# Patient Record
Sex: FEMALE | Race: WHITE | NOT HISPANIC OR LATINO | Employment: OTHER | URBAN - METROPOLITAN AREA
[De-identification: names, ages, dates, MRNs, and addresses within clinical notes are randomized per-mention and may not be internally consistent; named-entity substitution may affect disease eponyms.]

---

## 2017-02-08 ENCOUNTER — OFFICE VISIT (OUTPATIENT)
Dept: INTERNAL MEDICINE | Facility: CLINIC | Age: 67
End: 2017-02-08
Payer: COMMERCIAL

## 2017-02-08 VITALS
BODY MASS INDEX: 33.84 KG/M2 | DIASTOLIC BLOOD PRESSURE: 66 MMHG | OXYGEN SATURATION: 97 % | WEIGHT: 191 LBS | SYSTOLIC BLOOD PRESSURE: 121 MMHG | HEART RATE: 82 BPM | TEMPERATURE: 98.9 F

## 2017-02-08 DIAGNOSIS — H65.02 ACUTE SEROUS OTITIS MEDIA OF LEFT EAR, RECURRENCE NOT SPECIFIED: Primary | ICD-10-CM

## 2017-02-08 PROCEDURE — 99213 OFFICE O/P EST LOW 20 MIN: CPT | Performed by: INTERNAL MEDICINE

## 2017-02-08 NOTE — PROGRESS NOTES
SUBJECTIVE:                                                    Marisol Orta is a 66 year old female who presents to clinic today for the following health issues:      ENT Symptoms             Symptoms: cc Present Absent Comment   Fever/Chills   x    Fatigue  x     Muscle Aches   x    Eye Irritation   x    Sneezing   x    Nasal Mariano/Drg  x  Thick, light yellow, and a lot of it-this started only a few days ago. She also has nasal backdrip   Sinus Pressure/Pain  x     Loss of smell  x     Dental pain  x     Sore Throat   x    Swollen Glands   x    Ear Pain/Fullness  x  Left ear; decreased hearing.  no otorrhea, no tinnitus.     Cough  x  She feels that she needs to expectorate something at times, but has a hard time doing so.     Wheeze  x     Chest Pain   x    Shortness of breath  x     Rash   x    Other   x      Symptom duration: Cold for about 10 days, ear pain started today; and the other cold symptoms are worsening.    Symptom severity:  moderate ear pain   Treatments tried:  aleve- helped   Contacts:  family is sick       She has a family history of asthma and COPD, but she has no personal history of either.      Problem list and histories reviewed & adjusted, as indicated.  Additional history: as documented    Patient Active Problem List   Diagnosis     Back pain     Osteoarthritis     Hypertension goal BP (blood pressure) < 140/90     Vitreous membranes and strands     Posterior vitreous detachment- left     Advanced directives, counseling/discussion     Pseudoexfoliation of lens capsule, bilateral     CARDIOVASCULAR SCREENING; LDL GOAL LESS THAN 160     Cataract, nuclear, left     Past Surgical History   Procedure Laterality Date     Appendectomy       Foot surgery       left foot 1st  fusion     Orthopedic surgery         Social History   Substance Use Topics     Smoking status: Never Smoker      Smokeless tobacco: Never Used      Comment: smoke free household     Alcohol Use: No     Family History    Problem Relation Age of Onset     Lipids Mother      Cancer - colorectal Mother 70     Hypertension Mother      Arthritis Mother      CANCER Mother 84     lung cancer      Thyroid Disease Mother      Arthritis Father      Hypertension Father      Macular Degeneration Father 85     monitors visison with [employer] grid, more trouble reading     DIABETES Maternal Grandmother      DIABETES Paternal Grandmother      Asthma Brother      Hypertension Sister      Thyroid Disease Sister      Glaucoma Paternal Aunt      DIABETES Paternal Aunt      Eye Disorder Son 25     keratoconus?     Glaucoma Daughter 18     montoring pressure     CEREBROVASCULAR DISEASE No family hx of          Current Outpatient Prescriptions   Medication Sig Dispense Refill     triamterene-hydrochlorothiazide (DYAZIDE) 37.5-25 MG per capsule Take 1 capsule by mouth every morning 90 capsule 1     lisinopril (PRINIVIL/ZESTRIL) 2.5 MG tablet Take 1 tablet (2.5 mg) by mouth daily 90 tablet 1     Multiple Vitamins-Minerals (MULTIVITAMIN GUMMIES ADULT PO)        CALCIUM 600+D PO 2 tablet daily         ==============================================================  ROS:  Constitutional, HEENT, cardiovascular, pulmonary, GI, , musculoskeletal, neuro, skin, endocrine and psych systems are negative, except as otherwise noted.       OBJECTIVE:                                                    /66 mmHg  Pulse 82  Temp(Src) 98.9  F (37.2  C) (Oral)  Wt 191 lb (86.637 kg)  SpO2 97%  Body mass index is 33.84 kg/(m^2).     GENERAL APPEARANCE: healthy, alert and in no distress  EYES: Eyes grossly normal to inspection, and conjunctivae and sclerae normal  HENT: ear canals and right TM normal; left TM with erythema and edema; nose and mouth without ulcers or lesions, oropharynx clear and oral mucous membranes moist  NECK: no adenopathy, no asymmetry, masses, or scars   RESP: lungs clear to auscultation - no rales, rhonchi or wheezes  CV: regular rate and  rhythm, normal S1 S2, no S3 or S4, no murmur, click or rub, no peripheral edema and peripheral pulses strong  ABDOMEN: soft, nontender, no hepatosplenomegaly, no masses and bowel sounds normal  MS: no musculoskeletal defects are noted and gait is age appropriate without ataxia  SKIN: no suspicious lesions or rashes  NEURO: mentation intact and speech normal  PSYCH: mentation appears normal and affect normal/bright.        ASSESSMENT/PLAN:                                                        ICD-10-CM    1. Acute serous otitis media of left ear, recurrence not specified H65.02 amoxicillin-clavulanate (AUGMENTIN) 875-125 MG per tablet       Patient Instructions   Please take the antibiotics and let us know if you are not better after 3 complete days on it.        See below:    Otitis Media (Middle-Ear Infection) in Adults  Otitis media is another name for a middle-ear infection. It means an infection behind your eardrum. This kind of ear infection can happen after any condition that keeps fluid from draining from the middle ear. These conditions include allergies, a cold, a sore throat, or a respiratory infection.  Middle-ear infections are common in children, but they can also happen in adults. An ear infection in an adult may mean a more serious problem than in a child. So you may need additional tests. If you have an ear infection, you should see your health care provider for treatment.  What are the types of middle-ear infections?  Infections can affect the middle ear in several ways. They are:    Acute otitis media. This middle-ear infection occurs suddenly. It causes swelling and redness. Fluid and mucus become trapped inside the ear. You can have a fever and ear pain.    Otitis media with effusion. Fluid (effusion) and mucus build up in the middle ear after the infection goes away. You may feel like your middle ear is full. This can continue for months and may affect your hearing.    Chronic otitis media with  effusion. Fluid (effusion) remains in the middle ear for a long time. Or it builds up again and again, even though there is no infection. This type of middle-ear infection may be hard to treat. It may also affect your hearing.  Who is more likely to get a middle-ear infection?  You are more likely to get an ear infection if you:    Smoke or are around someone who smokes    Have seasonal or year-round allergy symptoms    Have a cold or other upper respiratory infection  What causes a middle-ear infection?  The middle ear connects to the throat by a canal called the eustachian tube. This tube helps even out the pressure between the outer ear and the inner ear. A cold or allergy can irritate the tube or cause the area around it to swell. This can keep fluid from draining from the middle ear. The fluid builds up behind the eardrum. Bacteria and viruses can grow in this fluid. The bacteria and viruses cause the middle-ear infection.  What are the symptoms of a middle-ear infection?  Common symptoms of a middle-ear infection in adults are:    Pain in 1 or both ears    Drainage from the ear    Muffled hearing    Sore throat   You may also have a fever. Rarely, your balance can be affected.  These symptoms may be the same as for other conditions. It s important to talk with your health care provider if you think you have a middle-ear infection. If you have a high fever, severe pain behind your ear, or paralysis in your face, see your provider as soon as you can.  How is a middle-ear infection diagnosed?  Your health care provider will take a medical history and do a physical exam. He or she will look at the outer ear and eardrum with an otoscope. The otoscope is a lighted tool that lets your provider see inside the ear. A pneumatic otoscope blows a puff of air into the ear to check how well your eardrum moves. If you eardrum doesn t move well, it may mean you have fluid behind it.  Your provider may also do a test called  tympanometry. This test tells how well the middle ear is working. It can find any changes in pressure in the middle ear. Your provider may test your hearing with a tuning fork.  How is a middle-ear infection treated?  A middle-ear infection may be treated with:    Antibiotics, taken by mouth or as ear drops    Medication for pain    Decongestants, antihistamines, or nasal steroids  Your health care provider may also have you try autoinsufflation. This helps adjust the air pressure in your ear. For this, you pinch your nose and gently exhale. This forces air back through the eustachian tube.  The exact treatment for your ear infection will depend on the type of infection you have. In general, if your symptoms don t get better in 48 to 72 hours, contact your health care provider.  Middle-ear infections can cause long-term problems if not treated. They can lead to:    Infection in other parts of the head    Permanent hearing loss    Paralysis of a nerve in your face  If you have a middle-ear infection that doesn t get better, you may need to see an ear, nose, and throat specialist (otolaryngologist). You may need a CT scan or MRI to check for head and neck cancer.      Middle Ear Infection (Adult)  You have an infection of the middle ear (the space behind the eardrum). This is also called acute otitis media (AOM). Sometimes it is caused by the common cold. This is because congestion can block the internal passage (eustachian tube) that drains fluid from the middle ear. When the middle ear fills with fluid, bacteria can grow there and cause an infection. Oral antibiotics are used to treat this illness, not ear drops. Symptoms usually start to improve within 1 to 2 days of treatment.    Home care  The following are general care guidelines:    Finish all of the antibiotic medicine given, even though you may feel better after the first few days.    You may use acetaminophen or ibuprofen to control pain, unless something  else was prescribed. [NOTE: If you have chronic liver or kidney disease or have ever had a stomach ulcer or GI bleeding, talk with your doctor before using these medicines.] Do not give aspirin to anyone under 18 years of age who has a fever. It may cause severe liver damage.  Follow-up care  Follow up with your doctor in 2 weeks if all symptoms have not gotten better, or if hearing doesn't go back to normal within 1 month.  When to seek medical care  Get prompt medical attention if any of the following occur:    Ear pain gets worse or does not improve after 3 days of treatment    Unusual drowsiness or confusion    Neck pain, stiff neck, or headache    Fluid or blood draining from the ear canal    Fever of 100.4 F (38 C) or higher after 3 days of antibiotics, or as directed by your health care provider    Convulsion (seizure)    7283-9505 [employer]. [address] [place], [place] [id number]. All rights reserved. This information is not intended as a substitute for professional medical care. Always follow your healthcare professional's instructions.                        Silvia Miller MD  [facility]

## 2017-02-08 NOTE — NURSING NOTE
"Chief Complaint   Patient presents with     Otalgia     Left ear pain       Initial /66 mmHg  Pulse 82  Temp(Src) 98.9  F (37.2  C) (Oral)  Wt 191 lb (86.637 kg)  SpO2 97% Estimated body mass index is 33.84 kg/(m^2) as calculated from the following:    Height as of 12/27/16: 5' 3\" (1.6 m).    Weight as of this encounter: 191 lb (86.637 kg).  BP completed using cuff size: jenna Love, KAREN    "

## 2017-02-08 NOTE — MR AVS SNAPSHOT
After Visit Summary   2/8/2017    Marisol Orta    MRN: [id number]           Patient Information     Date Of Birth          1950        Visit Information        Provider Department      2/8/2017 5:20 PM Silvia Miller MD [facility]        Today's Diagnoses     Acute serous otitis media of left ear, recurrence not specified    -  1       Care Instructions    Please take the antibiotics and let us know if you are not better after 3 complete days on it.        See below:    Otitis Media (Middle-Ear Infection) in Adults  Otitis media is another name for a middle-ear infection. It means an infection behind your eardrum. This kind of ear infection can happen after any condition that keeps fluid from draining from the middle ear. These conditions include allergies, a cold, a sore throat, or a respiratory infection.  Middle-ear infections are common in children, but they can also happen in adults. An ear infection in an adult may mean a more serious problem than in a child. So you may need additional tests. If you have an ear infection, you should see your health care provider for treatment.  What are the types of middle-ear infections?  Infections can affect the middle ear in several ways. They are:    Acute otitis media. This middle-ear infection occurs suddenly. It causes swelling and redness. Fluid and mucus become trapped inside the ear. You can have a fever and ear pain.    Otitis media with effusion. Fluid (effusion) and mucus build up in the middle ear after the infection goes away. You may feel like your middle ear is full. This can continue for months and may affect your hearing.    Chronic otitis media with effusion. Fluid (effusion) remains in the middle ear for a long time. Or it builds up again and again, even though there is no infection. This type of middle-ear infection may be hard to treat. It may also affect your hearing.  Who is more likely to get a  middle-ear infection?  You are more likely to get an ear infection if you:    Smoke or are around someone who smokes    Have seasonal or year-round allergy symptoms    Have a cold or other upper respiratory infection  What causes a middle-ear infection?  The middle ear connects to the throat by a canal called the eustachian tube. This tube helps even out the pressure between the outer ear and the inner ear. A cold or allergy can irritate the tube or cause the area around it to swell. This can keep fluid from draining from the middle ear. The fluid builds up behind the eardrum. Bacteria and viruses can grow in this fluid. The bacteria and viruses cause the middle-ear infection.  What are the symptoms of a middle-ear infection?  Common symptoms of a middle-ear infection in adults are:    Pain in 1 or both ears    Drainage from the ear    Muffled hearing    Sore throat   You may also have a fever. Rarely, your balance can be affected.  These symptoms may be the same as for other conditions. It s important to talk with your health care provider if you think you have a middle-ear infection. If you have a high fever, severe pain behind your ear, or paralysis in your face, see your provider as soon as you can.  How is a middle-ear infection diagnosed?  Your health care provider will take a medical history and do a physical exam. He or she will look at the outer ear and eardrum with an otoscope. The otoscope is a lighted tool that lets your provider see inside the ear. A pneumatic otoscope blows a puff of air into the ear to check how well your eardrum moves. If you eardrum doesn t move well, it may mean you have fluid behind it.  Your provider may also do a test called tympanometry. This test tells how well the middle ear is working. It can find any changes in pressure in the middle ear. Your provider may test your hearing with a tuning fork.  How is a middle-ear infection treated?  A middle-ear infection may be treated  with:    Antibiotics, taken by mouth or as ear drops    Medication for pain    Decongestants, antihistamines, or nasal steroids  Your health care provider may also have you try autoinsufflation. This helps adjust the air pressure in your ear. For this, you pinch your nose and gently exhale. This forces air back through the eustachian tube.  The exact treatment for your ear infection will depend on the type of infection you have. In general, if your symptoms don t get better in 48 to 72 hours, contact your health care provider.  Middle-ear infections can cause long-term problems if not treated. They can lead to:    Infection in other parts of the head    Permanent hearing loss    Paralysis of a nerve in your face  If you have a middle-ear infection that doesn t get better, you may need to see an ear, nose, and throat specialist (otolaryngologist). You may need a CT scan or MRI to check for head and neck cancer.      Middle Ear Infection (Adult)  You have an infection of the middle ear (the space behind the eardrum). This is also called acute otitis media (AOM). Sometimes it is caused by the common cold. This is because congestion can block the internal passage (eustachian tube) that drains fluid from the middle ear. When the middle ear fills with fluid, bacteria can grow there and cause an infection. Oral antibiotics are used to treat this illness, not ear drops. Symptoms usually start to improve within 1 to 2 days of treatment.    Home care  The following are general care guidelines:    Finish all of the antibiotic medicine given, even though you may feel better after the first few days.    You may use acetaminophen or ibuprofen to control pain, unless something else was prescribed. [NOTE: If you have chronic liver or kidney disease or have ever had a stomach ulcer or GI bleeding, talk with your doctor before using these medicines.] Do not give aspirin to anyone under 18 years of age who has a fever. It may cause  severe liver damage.  Follow-up care  Follow up with your doctor in 2 weeks if all symptoms have not gotten better, or if hearing doesn't go back to normal within 1 month.  When to seek medical care  Get prompt medical attention if any of the following occur:    Ear pain gets worse or does not improve after 3 days of treatment    Unusual drowsiness or confusion    Neck pain, stiff neck, or headache    Fluid or blood draining from the ear canal    Fever of 100.4 F (38 C) or higher after 3 days of antibiotics, or as directed by your health care provider    Convulsion (seizure)    [id number] [employer]. [address] [place] [address]. All rights reserved. This information is not intended as a substitute for professional medical care. Always follow your healthcare professional's instructions.              Follow-ups after your visit        Your next 10 appointments already scheduled     Mar 21, 2017  8:30 AM   [facility]    [address] [facility] [address]   [phone]           Patient must bring picture ID.  Patient should be prepared to give a urine specimen  Please do not eat 10-12 hours before your appointment if you are coming in fasting for labs on lipids, cholesterol, or glucose (sugar).  Pregnant women should follow their Care Team instructions. Water with medications is okay. Do not drink coffee or other fluids.   If you have concerns about taking  your medications, please ask at office or if scheduling via [employer], send a message by clicking on Secure Messaging, Message Your Care Team.            Jun 20, 2017  8:00 AM   LAB with AN LAB   [facility]    [address] [facility] [address]   [phone]           Patient must bring picture ID.  Patient should be prepared to give a urine specimen  Please do not eat 10-12 hours before your appointment if you are coming  in fasting for labs on lipids, cholesterol, or glucose (sugar).  Pregnant women should follow their Care Team instructions. Water with medications is okay. Do not drink coffee or other fluids.   If you have concerns about taking  your medications, please ask at office or if scheduling via [employer], send a message by clicking on Secure Messaging, Message Your Care Team.            Jun 27, 2017  8:15 AM   PHYSICAL with Kizzy Thomason MD   [facility] ([facility])    [address] [facility] [address] [phone]              Who to contact     If you have questions or need follow up information about today's clinic visit or your schedule please contact [facility] directly at [phone].  Normal or non-critical lab and imaging results will be communicated to you by Handseeing Informationhart, letter or phone within 4 business days after the clinic has received the results. If you do not hear from us within 7 days, please contact the clinic through Handseeing Informationhart or phone. If you have a critical or abnormal lab result, we will notify you by phone as soon as possible.  Submit refill requests through [employer] or call your pharmacy and they will forward the refill request to us. Please allow 3 business days for your refill to be completed.          Additional Information About Your Visit        [employer] Information     [employer] gives you secure access to your electronic health record. If you see a primary care provider, you can also send messages to your care team and make appointments. If you have questions, please call your primary care clinic.  If you do not have a primary care provider, please call [phone] and they will assist you.        Care EveryWhere ID     This is your Care EveryWhere ID. This could be used by other organizations to access your [facility] medical records  [id number]        Your Vitals Were     Pulse Temperature Pulse Oximetry             82 98.9  F (37.2  C)  (Oral) 97%          Blood Pressure from Last 3 Encounters:   02/08/17 121/66   12/27/16 137/76   07/27/16 125/78    Weight from Last 3 Encounters:   02/08/17 191 lb (86.637 kg)   12/27/16 193 lb (87.544 kg)   07/27/16 190 lb (86.183 kg)              Today, you had the following     No orders found for display         Today's Medication Changes          These changes are accurate as of: 2/8/17  6:33 PM.  If you have any questions, ask your nurse or doctor.               Start taking these medicines.        Dose/Directions    amoxicillin-clavulanate 875-125 MG per tablet   Commonly known as:  AUGMENTIN   Used for:  Acute serous otitis media of left ear, recurrence not specified   Started by:  Silvia Miller MD        Dose:  1 tablet   Take 1 tablet by mouth 2 times daily   Quantity:  20 tablet   Refills:  0            Where to get your medicines      These medications were sent to [facility]/pharmacy #[id number] - [place] [id number] [facility],  [address] [address]     Phone:  [phone]    - amoxicillin-clavulanate 875-125 MG per tablet             Primary Care Provider Office Phone # Fax #    Kizzy Thomason [phone]       [facility] [address] [facility] [address]        Thank you!     Thank you for choosing [facility]  for your care. Our goal is always to provide you with excellent care. Hearing back from our patients is one way we can continue to improve our services. Please take a few minutes to complete the written survey that you may receive in the mail after your visit with us. Thank you!             Your Updated Medication List - Protect others around you: Learn how to safely use, store and throw away your medicines at www.disposemymeds.org.          This list is accurate as of: 2/8/17  6:33 PM.  Always use your most recent med list.                   Brand Name Dispense  Instructions for use    amoxicillin-clavulanate 875-125 MG per tablet    AUGMENTIN    20 tablet    Take 1 tablet by mouth 2 times daily       CALCIUM 600+D PO      2 tablet daily       lisinopril 2.5 MG tablet    PRINIVIL/Zestril    90 tablet    Take 1 tablet (2.5 mg) by mouth daily       MULTIVITAMIN GUMMIES ADULT PO          triamterene-hydrochlorothiazide 37.5-25 MG per capsule    DYAZIDE    90 capsule    Take 1 capsule by mouth every morning

## 2017-02-09 NOTE — PATIENT INSTRUCTIONS
Please take the antibiotics and let us know if you are not better after 3 complete days on it.        See below:    Otitis Media (Middle-Ear Infection) in Adults  Otitis media is another name for a middle-ear infection. It means an infection behind your eardrum. This kind of ear infection can happen after any condition that keeps fluid from draining from the middle ear. These conditions include allergies, a cold, a sore throat, or a respiratory infection.  Middle-ear infections are common in children, but they can also happen in adults. An ear infection in an adult may mean a more serious problem than in a child. So you may need additional tests. If you have an ear infection, you should see your health care provider for treatment.  What are the types of middle-ear infections?  Infections can affect the middle ear in several ways. They are:    Acute otitis media. This middle-ear infection occurs suddenly. It causes swelling and redness. Fluid and mucus become trapped inside the ear. You can have a fever and ear pain.    Otitis media with effusion. Fluid (effusion) and mucus build up in the middle ear after the infection goes away. You may feel like your middle ear is full. This can continue for months and may affect your hearing.    Chronic otitis media with effusion. Fluid (effusion) remains in the middle ear for a long time. Or it builds up again and again, even though there is no infection. This type of middle-ear infection may be hard to treat. It may also affect your hearing.  Who is more likely to get a middle-ear infection?  You are more likely to get an ear infection if you:    Smoke or are around someone who smokes    Have seasonal or year-round allergy symptoms    Have a cold or other upper respiratory infection  What causes a middle-ear infection?  The middle ear connects to the throat by a canal called the eustachian tube. This tube helps even out the pressure between the outer ear and the inner ear. A  cold or allergy can irritate the tube or cause the area around it to swell. This can keep fluid from draining from the middle ear. The fluid builds up behind the eardrum. Bacteria and viruses can grow in this fluid. The bacteria and viruses cause the middle-ear infection.  What are the symptoms of a middle-ear infection?  Common symptoms of a middle-ear infection in adults are:    Pain in 1 or both ears    Drainage from the ear    Muffled hearing    Sore throat   You may also have a fever. Rarely, your balance can be affected.  These symptoms may be the same as for other conditions. It s important to talk with your health care provider if you think you have a middle-ear infection. If you have a high fever, severe pain behind your ear, or paralysis in your face, see your provider as soon as you can.  How is a middle-ear infection diagnosed?  Your health care provider will take a medical history and do a physical exam. He or she will look at the outer ear and eardrum with an otoscope. The otoscope is a lighted tool that lets your provider see inside the ear. A pneumatic otoscope blows a puff of air into the ear to check how well your eardrum moves. If you eardrum doesn t move well, it may mean you have fluid behind it.  Your provider may also do a test called tympanometry. This test tells how well the middle ear is working. It can find any changes in pressure in the middle ear. Your provider may test your hearing with a tuning fork.  How is a middle-ear infection treated?  A middle-ear infection may be treated with:    Antibiotics, taken by mouth or as ear drops    Medication for pain    Decongestants, antihistamines, or nasal steroids  Your health care provider may also have you try autoinsufflation. This helps adjust the air pressure in your ear. For this, you pinch your nose and gently exhale. This forces air back through the eustachian tube.  The exact treatment for your ear infection will depend on the type of  infection you have. In general, if your symptoms don t get better in 48 to 72 hours, contact your health care provider.  Middle-ear infections can cause long-term problems if not treated. They can lead to:    Infection in other parts of the head    Permanent hearing loss    Paralysis of a nerve in your face  If you have a middle-ear infection that doesn t get better, you may need to see an ear, nose, and throat specialist (otolaryngologist). You may need a CT scan or MRI to check for head and neck cancer.      Middle Ear Infection (Adult)  You have an infection of the middle ear (the space behind the eardrum). This is also called acute otitis media (AOM). Sometimes it is caused by the common cold. This is because congestion can block the internal passage (eustachian tube) that drains fluid from the middle ear. When the middle ear fills with fluid, bacteria can grow there and cause an infection. Oral antibiotics are used to treat this illness, not ear drops. Symptoms usually start to improve within 1 to 2 days of treatment.    Home care  The following are general care guidelines:    Finish all of the antibiotic medicine given, even though you may feel better after the first few days.    You may use acetaminophen or ibuprofen to control pain, unless something else was prescribed. [NOTE: If you have chronic liver or kidney disease or have ever had a stomach ulcer or GI bleeding, talk with your doctor before using these medicines.] Do not give aspirin to anyone under 18 years of age who has a fever. It may cause severe liver damage.  Follow-up care  Follow up with your doctor in 2 weeks if all symptoms have not gotten better, or if hearing doesn't go back to normal within 1 month.  When to seek medical care  Get prompt medical attention if any of the following occur:    Ear pain gets worse or does not improve after 3 days of treatment    Unusual drowsiness or confusion    Neck pain, stiff neck, or headache    Fluid or  blood draining from the ear canal    Fever of 100.4 F (38 C) or higher after 3 days of antibiotics, or as directed by your health care provider    Convulsion (seizure)    9007-5934 [employer]. [address] [place] [address], [place] [address]. All rights reserved. This information is not intended as a substitute for professional medical care. Always follow your healthcare professional's instructions.

## 2017-03-21 DIAGNOSIS — R79.89 ELEVATED TSH: ICD-10-CM

## 2017-03-21 LAB — TSH SERPL DL<=0.005 MIU/L-ACNC: 2.22 MU/L (ref 0.4–4)

## 2017-03-21 PROCEDURE — 36415 COLL VENOUS BLD VENIPUNCTURE: CPT | Performed by: FAMILY MEDICINE

## 2017-03-21 PROCEDURE — 84443 ASSAY THYROID STIM HORMONE: CPT | Performed by: FAMILY MEDICINE

## 2017-04-06 ENCOUNTER — OFFICE VISIT (OUTPATIENT)
Dept: OPTOMETRY | Facility: CLINIC | Age: 67
End: 2017-04-06
Payer: MEDICARE

## 2017-04-06 DIAGNOSIS — H25.13 NUCLEAR SCLEROTIC CATARACT OF BOTH EYES: Primary | ICD-10-CM

## 2017-04-06 DIAGNOSIS — H52.203 ASTIGMATISM OF BOTH EYES: ICD-10-CM

## 2017-04-06 DIAGNOSIS — H52.4 PRESBYOPIA: ICD-10-CM

## 2017-04-06 DIAGNOSIS — H26.8 PSEUDOEXFOLIATION OF LENS CAPSULE: ICD-10-CM

## 2017-04-06 DIAGNOSIS — H52.13 MYOPIA OF BOTH EYES: ICD-10-CM

## 2017-04-06 PROCEDURE — 92015 DETERMINE REFRACTIVE STATE: CPT | Mod: GY | Performed by: OPTOMETRIST

## 2017-04-06 PROCEDURE — 92014 COMPRE OPH EXAM EST PT 1/>: CPT | Performed by: OPTOMETRIST

## 2017-04-06 ASSESSMENT — SLIT LAMP EXAM - LIDS
COMMENTS: NORMAL
COMMENTS: NORMAL

## 2017-04-06 ASSESSMENT — REFRACTION_MANIFEST
OS_AXIS: 140
OD_SPHERE: -3.50
OD_AXIS: 030
OD_AXIS: 026
OS_AXIS: 141
OS_SPHERE: -6.00
OS_CYLINDER: +0.50
OD_SPHERE: -3.25
OD_CYLINDER: +1.00
METHOD_AUTOREFRACTION: 1
OS_ADD: +2.50
OD_CYLINDER: +1.00
OD_ADD: +2.50
OS_SPHERE: -6.00
OS_CYLINDER: +0.50

## 2017-04-06 ASSESSMENT — CUP TO DISC RATIO
OD_RATIO: 0.2
OS_RATIO: 0.2

## 2017-04-06 ASSESSMENT — CONF VISUAL FIELD
OD_NORMAL: 1
OS_NORMAL: 1

## 2017-04-06 ASSESSMENT — TONOMETRY
OS_IOP_MMHG: 18
IOP_METHOD: APPLANATION
OD_IOP_MMHG: 18

## 2017-04-06 ASSESSMENT — KERATOMETRY
OD_AXISANGLE2_DEGREES: 146
OS_K2POWER_DIOPTERS: 43.50
OD_K1POWER_DIOPTERS: 42.00
OS_AXISANGLE2_DEGREES: 15
OD_K2POWER_DIOPTERS: 43.00
OS_K1POWER_DIOPTERS: 42.25

## 2017-04-06 ASSESSMENT — VISUAL ACUITY
OS_CC: 20/40
OD_CC: 20/25
OS_PH_CC: 20/30-1
OS_CC: 20/25+1
OS_CC+: -1
CORRECTION_TYPE: GLASSES
OD_CC+: -1
METHOD: SNELLEN - LINEAR
OD_CC: 20/20

## 2017-04-06 ASSESSMENT — REFRACTION_WEARINGRX
OD_CYLINDER: +1.25
OS_SPHERE: -4.50
OS_CYLINDER: +0.50
OS_ADD: +2.50
OD_SPHERE: -3.75
OD_ADD: +2.50
OS_AXIS: 115
OD_AXIS: 040
SPECS_TYPE: PAL

## 2017-04-06 ASSESSMENT — EXTERNAL EXAM - RIGHT EYE: OD_EXAM: NORMAL

## 2017-04-06 ASSESSMENT — EXTERNAL EXAM - LEFT EYE: OS_EXAM: NORMAL

## 2017-04-06 ASSESSMENT — PACHYMETRY
OD_CT(UM): 608
OS_CT(UM): 616

## 2017-04-06 NOTE — MR AVS SNAPSHOT
After Visit Summary   4/6/2017    Marisol Orta    MRN: [id number]           Patient Information     Date Of Birth          1950        Visit Information        Provider Department      4/6/2017 7:30 AM Sindhu Leach OD [facility]        Care Instructions    Patient was advised of today's exam findings.  Wait to fill glasses prescription   Mild cataracts, large prescription change in left eye  Return in 1 year for eye exam  Refer to [facility] [place] for cataract and baseline OCT and Field testing due to bilateral pseudoexfoliation   Their office will call you to schedule appointment.   Please call [phone] if you have not heard from them within 1 week.    Pati Ramirez and Tino  [facility]  [address] [place], [place] [address] [phone]    Sindhu Leach O.D.  [facility]   [address] [place], [place] [id number] [phone]            Follow-ups after your visit        Your next 10 appointments already scheduled     Jun 20, 2017  8:00 AM CDT   LAB with AN LAB   [facility]    [address] [facility] [address] [phone]           Patient must bring picture ID.  Patient should be prepared to give a urine specimen  Please do not eat 10-12 hours before your appointment if you are coming in fasting for labs on lipids, cholesterol, or glucose (sugar).  Pregnant women should follow their Care Team instructions. Water with medications is okay. Do not drink coffee or other fluids.   If you have concerns about taking  your medications, please ask at office or if scheduling via [facility], send a message by clicking on Secure Messaging, Message Your Care Team.            Jun 27, 2017  8:15 AM CDT   PHYSICAL with Kizzy Thomason MD   [facility] ([facility]    [address] [facility] [address] [phone]               Who to contact     If you have questions or need follow up information about today's clinic visit or your schedule please contact [facility] directly at [phone].  Normal or non-critical lab and imaging results will be communicated to you by MyChart, letter or phone within 4 business days after the clinic has received the results. If you do not hear from us within 7 days, please contact the clinic through [employer] or phone. If you have a critical or abnormal lab result, we will notify you by phone as soon as possible.  Submit refill requests through [employer] or call your pharmacy and they will forward the refill request to us. Please allow 3 business days for your refill to be completed.          Additional Information About Your Visit        [employer] Information     [employer] gives you secure access to your electronic health record. If you see a primary care provider, you can also send messages to your care team and make appointments. If you have questions, please call your primary care clinic.  If you do not have a primary care provider, please call [phone] and they will assist you.        Care EveryWhere ID     This is your Care EveryWhere ID. This could be used by other organizations to access your [place] medical records  [id number]         Blood Pressure from Last 3 Encounters:   02/08/17 121/66   12/27/16 137/76   07/27/16 125/78    Weight from Last 3 Encounters:   02/08/17 86.6 kg (191 lb)   12/27/16 87.5 kg (193 lb)   07/27/16 86.2 kg (190 lb)              Today, you had the following     No orders found for display       Primary Care Provider Office Phone # Fax #    Kizzy Thomason [phone]       [facility] [address] [address]        Thank you!     Thank you for choosing [facility]  for your care. Our goal is always to provide you with excellent care. Hearing back from our patients is one way we can continue to  improve our services. Please take a few minutes to complete the written survey that you may receive in the mail after your visit with us. Thank you!             Your Updated Medication List - Protect others around you: Learn how to safely use, store and throw away your medicines at www.disposemymeds.org.          This list is accurate as of: 4/6/17  8:35 AM.  Always use your most recent med list.                   Brand Name Dispense Instructions for use    amoxicillin-clavulanate 875-125 MG per tablet    AUGMENTIN    20 tablet    Take 1 tablet by mouth 2 times daily       CALCIUM 600+D PO      2 tablet daily       lisinopril 2.5 MG tablet    PRINIVIL/Zestril    90 tablet    Take 1 tablet (2.5 mg) by mouth daily       MULTIVITAMIN GUMMIES ADULT PO          triamterene-hydrochlorothiazide 37.5-25 MG per capsule    DYAZIDE    90 capsule    Take 1 capsule by mouth every morning

## 2017-04-06 NOTE — PATIENT INSTRUCTIONS
Patient was advised of today's exam findings.  Wait to fill glasses prescription   Mild cataracts, large prescription change in left eye  Return in 1 year for eye exam  Refer to [facility] [place] for cataract and baseline OCT and Field testing due to bilateral pseudoexfoliation   Their office will call you to schedule appointment.   Please call [phone] if you have not heard from them within 1 week.    Pati Ramirez and Tino  [facility]  [address]. [place] [id number] [phone]    Sindhu Leach O.D.  [facility]   [address] [place], [place] [id number] [phone]

## 2017-04-06 NOTE — PROGRESS NOTES
Chief Complaint   Patient presents with     COMPREHENSIVE EYE EXAM     changes in vision with left eye         Last Eye Exam: 4/5/2016  Dilated Previously: Yes    What are you currently using to see?  Glasses, wears glasses all of the time        Distance Vision Acuity: Noticed gradual change in both eyes, seems like it changed right away in her left eye after she was here last year     Near Vision Acuity: Not satisfied, also believes that there have been changes to near vision. USed to remove her glasses to read, now she leaves them on     Eye Comfort: good and dry at times, by evening   Do you use eye drops? 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  Father -macular degeneration        OBJECTIVE: See Ophthalmology exam    ASSESSMENT:    ICD-10-CM    1. Nuclear sclerotic cataract of both eyes H25.13 EYE EXAM (SIMPLE-NONBILLABLE)     REFRACTIVE STATUS     OPHTHALMOLOGY ADULT REFERRAL   2. 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 Myopia of both eyes H52.13 EYE EXAM (SIMPLE-NONBILLABLE)     REFRACTIVE STATUS   4. Astigmatism of both eyes H52.203 EYE EXAM (SIMPLE-NONBILLABLE)     REFRACTIVE STATUS   5. Presbyopia H52.4 EYE EXAM (SIMPLE-NONBILLABLE)     REFRACTIVE STATUS      PLAN:     Patient Instructions   Patient was advised of today's exam findings.  Wait to fill glasses prescription   Mild cataracts, large prescription change in left eye  Return in 1 year for eye exam  Refer to [facility] [place] for cataract and baseline OCT and Field testing due to bilateral pseudoexfoliation   Their office will call you to schedule appointment.   Please call [phone] if you have not heard from them within 1 week.    Pati Ramirez and Tino  [facility]  [address] [place] [address]. [place]  [address]   [phone]    Sindhu Leach O.D.  [facility]   [address] [place] [address]  [phone]

## 2017-05-03 ENCOUNTER — OFFICE VISIT (OUTPATIENT)
Dept: OPHTHALMOLOGY | Facility: CLINIC | Age: 67
End: 2017-05-03
Payer: MEDICARE

## 2017-05-03 ENCOUNTER — TELEPHONE (OUTPATIENT)
Dept: FAMILY MEDICINE | Facility: CLINIC | Age: 67
End: 2017-05-03

## 2017-05-03 DIAGNOSIS — H40.003 GLAUCOMA SUSPECT, BILATERAL: ICD-10-CM

## 2017-05-03 DIAGNOSIS — H25.813 COMBINED FORM OF AGE-RELATED CATARACT, BOTH EYES: ICD-10-CM

## 2017-05-03 DIAGNOSIS — H26.8 PSEUDOEXFOLIATION OF LENS CAPSULE: Primary | ICD-10-CM

## 2017-05-03 PROCEDURE — 76514 ECHO EXAM OF EYE THICKNESS: CPT | Performed by: STUDENT IN AN ORGANIZED HEALTH CARE EDUCATION/TRAINING PROGRAM

## 2017-05-03 PROCEDURE — 92133 CPTRZD OPH DX IMG PST SGM ON: CPT | Performed by: STUDENT IN AN ORGANIZED HEALTH CARE EDUCATION/TRAINING PROGRAM

## 2017-05-03 PROCEDURE — 92083 EXTENDED VISUAL FIELD XM: CPT | Performed by: STUDENT IN AN ORGANIZED HEALTH CARE EDUCATION/TRAINING PROGRAM

## 2017-05-03 PROCEDURE — 92004 COMPRE OPH EXAM NEW PT 1/>: CPT | Performed by: STUDENT IN AN ORGANIZED HEALTH CARE EDUCATION/TRAINING PROGRAM

## 2017-05-03 ASSESSMENT — VISUAL ACUITY
OD_BAT_HIGH: 20/40
OS_CC: 20/50
OS_BAT_HIGH: >20/400
OD_CC: 20/25
CORRECTION_TYPE: GLASSES
OS_BAT_MED: 20/125
METHOD: SNELLEN - LINEAR
OD_CC+: -2
OS_CC+: -2
OS_PH_CC: 25-3
OD_BAT_MED: 20/40

## 2017-05-03 ASSESSMENT — PACHYMETRY
OS_CT(UM): .601
OD_CT(UM): .605

## 2017-05-03 ASSESSMENT — CUP TO DISC RATIO
OS_RATIO: 0.2
OD_RATIO: 0.2

## 2017-05-03 ASSESSMENT — REFRACTION_MANIFEST
OS_ADD: +2.75
OD_AXIS: 036
OD_SPHERE: -3.25
OD_ADD: +2.75
OS_SPHERE: -6.00
OD_CYLINDER: +1.00
OS_AXIS: 130
OS_CYLINDER: +1.50

## 2017-05-03 ASSESSMENT — REFRACTION_WEARINGRX
OS_AXIS: 115
OS_CYLINDER: +0.50
OS_SPHERE: -4.50
OD_SPHERE: -3.75
SPECS_TYPE: PAL
OS_ADD: +2.50
OD_ADD: +2.50
OD_AXIS: 040
OD_CYLINDER: +1.25

## 2017-05-03 ASSESSMENT — SLIT LAMP EXAM - LIDS
COMMENTS: NORMAL
COMMENTS: NORMAL

## 2017-05-03 ASSESSMENT — EXTERNAL EXAM - LEFT EYE: OS_EXAM: NORMAL

## 2017-05-03 ASSESSMENT — EXTERNAL EXAM - RIGHT EYE: OD_EXAM: NORMAL

## 2017-05-03 ASSESSMENT — TONOMETRY
OD_IOP_MMHG: 17
OS_IOP_MMHG: 18
IOP_METHOD: APPLANATION

## 2017-05-03 NOTE — PATIENT INSTRUCTIONS
Visually significant cataract that is interfering with daily activities of living. Plan for cataract extraction and intraocular lens implant left eye.  Risks, benefits, complications, and alternatives discussed with patient including possibility of limitations from coexistent eye disease and loss of vision. Target refraction and lens options discussed.  Patient understands and wishes to proceed with surgery.    Caprice Jiménez MD  [phone]

## 2017-05-03 NOTE — TELEPHONE ENCOUNTER
Panel Management Review      Patient has the following on her problem list: None      Composite cancer screening  Chart review shows that this patient is due/due soon for the following Pap Smear  Summary:    Patient is due/failing the following:   PAP    Action needed:   None pt has appt in June for AFE     Type of outreach:    none    Questions for provider review:    None                                                                                                                                    Azra Lazar [facility]       Chart routed to Care Team .

## 2017-05-03 NOTE — MR AVS SNAPSHOT
After Visit Summary   5/3/2017    Marisol Orta    MRN: [id number]           Patient Information     Date Of Birth          1950        Visit Information        Provider Department      5/3/2017 9:15 AM Caprice Jiménez MD [facility]        Today's Diagnoses     Pseudoexfoliation of lens capsule, bilateral    -  1    Combined form of age-related cataract, both eyes          Care Instructions    Visually significant cataract that is interfering with daily activities of living. Plan for cataract extraction and intraocular lens implant left eye.  Risks, benefits, complications, and alternatives discussed with patient including possibility of limitations from coexistent eye disease and loss of vision. Target refraction and lens options discussed.  Patient understands and wishes to proceed with surgery.    Caprice Jiménez MD  [phone]            Follow-ups after your visit        Your next 10 appointments already scheduled     Jun 20, 2017  8:00 AM CDT   LAB with AN LAB   [facility]    [address] [facility] [address] [phone]           Patient must bring picture ID.  Patient should be prepared to give a urine specimen  Please do not eat 10-12 hours before your appointment if you are coming in fasting for labs on lipids, cholesterol, or glucose (sugar).  Pregnant women should follow their Care Team instructions. Water with medications is okay. Do not drink coffee or other fluids.   If you have concerns about taking  your medications, please ask at office or if scheduling via [employer], send a message by clicking on Secure Messaging, Message Your Care Team.            Jun 27, 2017  8:15 AM CDT   PHYSICAL with Kizzy Thomason MD   [facility]    [address] [facility] [address] [phone]              Who to contact     If you have questions or need follow up  information about today's clinic visit or your schedule please contact [facility] directly at [phone].  Normal or non-critical lab and imaging results will be communicated to you by MyChart, letter or phone within 4 business days after the clinic has received the results. If you do not hear from us within 7 days, please contact the clinic through [employer] or phone. If you have a critical or abnormal lab result, we will notify you by phone as soon as possible.  Submit refill requests through [employer] or call your pharmacy and they will forward the refill request to us. Please allow 3 business days for your refill to be completed.          Additional Information About Your Visit        [employer] Information     [employer] gives you secure access to your electronic health record. If you see a primary care provider, you can also send messages to your care team and make appointments. If you have questions, please call your primary care clinic.  If you do not have a primary care provider, please call [phone] and they will assist you.        Care EveryWhere ID     This is your Care EveryWhere ID. This could be used by other organizations to access your [place] medical records  [id number]         Blood Pressure from Last 3 Encounters:   02/08/17 121/66   12/27/16 137/76   07/27/16 125/78    Weight from Last 3 Encounters:   02/08/17 86.6 kg (191 lb)   12/27/16 87.5 kg (193 lb)   07/27/16 86.2 kg (190 lb)              Today, you had the following     No orders found for display       Primary Care Provider Office Phone # Fax #    Kizzy Thomason [phone]       [facility] [address] [facility] [address]        Thank you!     Thank you for choosing [facility]  for your care. Our goal is always to provide you with excellent care. Hearing back from our patients is one way we can continue to improve our services. Please take a few minutes to complete the  written survey that you may receive in the mail after your visit with us. Thank you!             Your Updated Medication List - Protect others around you: Learn how to safely use, store and throw away your medicines at www.disposemymeds.org.          This list is accurate as of: 5/3/17 10:38 AM.  Always use your most recent med list.                   Brand Name Dispense Instructions for use    CALCIUM 600+D PO      2 tablet daily       lisinopril 2.5 MG tablet    PRINIVIL/Zestril    90 tablet    Take 1 tablet (2.5 mg) by mouth daily       MULTIVITAMIN GUMMIES ADULT PO          triamterene-hydrochlorothiazide 37.5-25 MG per capsule    DYAZIDE    90 capsule    Take 1 capsule by mouth every morning

## 2017-05-03 NOTE — PROGRESS NOTES
Current Eye Medications:  none     Subjective:  Dr. Leach recommended an evaluation of cataracts and Pseudoexfoliation ( glaucoma testing ), by Dr. Jiménez.  Patient has noticed decreasing vision, especially in her left eye.  She complains of a delay of focusing after she has been doing near work, and then attempts to see something in the distance.  Dr. Leach suggested to wait on changing her glasses prescription until she saw Dr. Jiménez.   Patient states she is very photophobic and wears her sunglasses frequently.     Daughter has elevated intraocular pressure (found at an eye exam when she was in High School), but does not have glaucoma.    Aunt had glaucoma and was blind at age 11.       Objective:  See Ophthalmology Exam.      Assessment:  Marisol Orta is a 67 year old female who presents with:     Pseudoexfoliation of lens capsule, bilateral C:D small and normotensive with thick central corneal thickness.  Celestin visual field (HVF) and OCT reliable and within normal limits both eyes.        Combined form of age-related cataract, both eyes Visually significant left eye > right eye.  BAT 20/125 right eye, 20/400 left eye       Plan:  Visually significant cataract that is interfering with daily activities of living. Plan for cataract extraction and intraocular lens implant left eye.  Risks, benefits, complications, and alternatives discussed with patient including possibility of limitations from coexistent eye disease and loss of vision. Target refraction and lens options discussed.  Patient understands and wishes to proceed with surgery.    Visually significant cataract, Left eye. Risks, benefits, alternatives of cataract surgery discussed; patient wishes to proceed with surgery.    Eye: Left, will consider right later  Pupil: 5.5  Pseudoexfoliation: Yes  Flomax: No  Diabetes mellitus: No  Glaucoma: No  Guttae: No  S/p refractive surgery or other eye surgery: No  Refractive target:  emmetropia - discuss    Anticoagulation: none  Anesthesia: MAC/topical  Able to lay flat:  Yes  Additional concerns: ?Malyugin ring, IFIS solution  Difficulty: 4-5    Caprice Jiménez MD  [phone]

## 2017-05-04 RX ORDER — MONOCHLOROACETIC ACID
1 CRYSTALS MISCELLANEOUS 4 TIMES DAILY
Qty: 1 BOTTLE | Refills: 1 | Status: SHIPPED | OUTPATIENT
Start: 2017-06-05 | End: 2017-08-25

## 2017-05-04 RX ORDER — DEXAMETHASONE ACETATE, MICRO 100 %
1 POWDER (GRAM) MISCELLANEOUS 4 TIMES DAILY
Qty: 1 BOTTLE | Refills: 1 | Status: SHIPPED | OUTPATIENT
Start: 2017-06-05 | End: 2017-08-25

## 2017-05-04 RX ORDER — BROMFENAC SODIUM 100 %
1 POWDER (GRAM) MISCELLANEOUS 4 TIMES DAILY
Qty: 1 BOTTLE | Refills: 1 | Status: SHIPPED | OUTPATIENT
Start: 2017-06-05 | End: 2017-08-25

## 2017-05-23 ENCOUNTER — OFFICE VISIT (OUTPATIENT)
Dept: OPHTHALMOLOGY | Facility: CLINIC | Age: 67
End: 2017-05-23
Payer: MEDICARE

## 2017-05-23 DIAGNOSIS — H40.003 GLAUCOMA SUSPECT, BILATERAL: ICD-10-CM

## 2017-05-23 DIAGNOSIS — H25.813 COMBINED FORM OF AGE-RELATED CATARACT, BOTH EYES: Primary | ICD-10-CM

## 2017-05-23 DIAGNOSIS — H26.8 PSEUDOEXFOLIATION OF LENS CAPSULE: ICD-10-CM

## 2017-05-23 PROCEDURE — 92012 INTRM OPH EXAM EST PATIENT: CPT | Performed by: STUDENT IN AN ORGANIZED HEALTH CARE EDUCATION/TRAINING PROGRAM

## 2017-05-23 PROCEDURE — 92136 OPHTHALMIC BIOMETRY: CPT | Mod: TC | Performed by: STUDENT IN AN ORGANIZED HEALTH CARE EDUCATION/TRAINING PROGRAM

## 2017-05-23 ASSESSMENT — SLIT LAMP EXAM - LIDS
COMMENTS: NORMAL
COMMENTS: NORMAL

## 2017-05-23 ASSESSMENT — TONOMETRY
OS_IOP_MMHG: 20
IOP_METHOD: APPLANATION

## 2017-05-23 ASSESSMENT — VISUAL ACUITY
METHOD: SNELLEN - LINEAR
OS_SC: 20/50-2

## 2017-05-23 ASSESSMENT — EXTERNAL EXAM - LEFT EYE: OS_EXAM: NORMAL

## 2017-05-23 ASSESSMENT — EXTERNAL EXAM - RIGHT EYE: OD_EXAM: NORMAL

## 2017-05-23 NOTE — PROGRESS NOTES
Current Eye Medications:  no     Subjective:  Pre op kpe os.  As above plus is scheduled for surgery on 06/06/2017. All questions answered.     Objective:  See Ophthalmology Exam.      Assessment:  Marisol Orta is a 67 year old female who presents with:     Combined form of age-related cataract, both eyes preop left eye. Pseudoexfoliation, dil 5.5, target distance.      Pseudoexfoliation of lens capsule, bilateral      Glaucoma suspect, bilateral        PRE-OP CATARACT INSTRUCTIONS  *Use the following drops in the left eye 4 times on the day before surgery and once the morning of surgery:     CatarActive3  *Please bring your eye drops   *If taking more than one drop, wait five minutes between drops.  *No solid food after midnight.  *Clear liquids: coffee (no cream), tea, water, and jello are OK before 8:30 A.M.  You may take your regular pills with this.  *If you are taking glaucoma drops, continue as usual.    Caprice Jiménez MD  [phone]

## 2017-05-23 NOTE — PATIENT INSTRUCTIONS
PRE-OP CATARACT INSTRUCTIONS    *Use the following drops in the left eye 4 times on the day before surgery and once the morning of surgery:                                 CatarActive3    *Please bring your eye drops     *If taking more than one drop, wait five minutes between drops.    *No solid food after midnight.    *Clear liquids: coffee (no cream), tea, water, and jello are OK before 8:30 A.M.  You may take your regular pills with this.    Caprice Jiménez MD  [phone]

## 2017-05-23 NOTE — MR AVS SNAPSHOT
After Visit Summary   5/23/2017    Marisol Orta    MRN: [id number]           Patient Information     Date Of Birth          1950        Visit Information        Provider Department      5/23/2017 8:45 AM Caprice Jiménez MD [facility]        Today's Diagnoses     Combined form of age-related cataract, both eyes    -  1    Pseudoexfoliation of lens capsule, bilateral        Glaucoma suspect, bilateral          Care Instructions    PRE-OP CATARACT INSTRUCTIONS    *Use the following drops in the left eye 4 times on the day before surgery and once the morning of surgery:                                 CatarActive3    *Please bring your eye drops     *If taking more than one drop, wait five minutes between drops.    *No solid food after midnight.    *Clear liquids: coffee (no cream), tea, water, and jello are OK before 8:30 A.M.  You may take your regular pills with this.    *If you are taking glaucoma drops, continue as usual.    Caprice Jiménez MD  [phone]        Follow-ups after your visit        Follow-up notes from your care team     Return for as scheduled, PO1.      Your next 10 appointments already scheduled     May 30, 2017  9:55 AM CDT   Pre-Op physical with [facility]izzy [facility] MD   [facility]    [address] [address] [phone]            Jun 06, 2017   Procedure with Caprice Jiménez MD   [facility] (--)    [address]  [facility] [address]   [phone]            Jun 07, 2017  2:00 PM CDT   [facility]    [address] [address]   [phone]            Jun 14, 2017  9:45 AM CDT   Return Visit with Caprice Jiménez MD   [facility]    [address] [address]   [phone]            Jun  19, 2017  7:45 AM CDT   LAB with AN LAB   [facility] ([facility])    [address] [address] [phone]           Patient must bring picture ID.  Patient should be prepared to give a urine specimen  Please do not eat 10-12 hours before your appointment if you are coming in fasting for labs on lipids, cholesterol, or glucose (sugar).  Pregnant women should follow their Care Team instructions. Water with medications is okay. Do not drink coffee or other fluids.   If you have concerns about taking  your medications, please ask at office or if scheduling via [employer], send a message by clicking on Secure Messaging, Message Your Care Team.            Jun 20, 2017   Procedure with Caprice Jiménez MD   [facility] (--)    [address]  [facility] [address]   [phone]            Jun 21, 2017 12:45 PM CDT   Return Visit with Caprice Jiménez MD   [facility] ([facility])    [address] [facility] [address] [phone]            Jun 27, 2017  8:15 AM CDT   PHYSICAL with Kizzy Thomason MD   [facility] ([facility])    [address] [address] [phone]            Jun 27, 2017 12:45 PM CDT   Return Visit with Caprice Jiménez MD   [facility] [place] ([facility])    [address] [facility]  [place] [address] [phone]            Jul 28, 2017  8:45 AM CDT   Return Visit with Caprice Jiménez MD   [facility] ([facility])    [address] [facility] [place] [address] [phone]              Who to contact     If you have questions or need follow up information about today's clinic visit or your schedule please contact [facility] directly at [phone].  Normal or non-critical lab and imaging results will be communicated to you by MyChart, letter or phone  within 4 business days after the clinic has received the results. If you do not hear from us within 7 days, please contact the clinic through [employer] or phone. If you have a critical or abnormal lab result, we will notify you by phone as soon as possible.  Submit refill requests through [employer] or call your pharmacy and they will forward the refill request to us. Please allow 3 business days for your refill to be completed.          Additional Information About Your Visit        [employer] Information     [employer] gives you secure access to your electronic health record. If you see a primary care provider, you can also send messages to your care team and make appointments. If you have questions, please call your primary care clinic.  If you do not have a primary care provider, please call [phone] and they will assist you.        Care EveryWhere ID     This is your Care EveryWhere ID. This could be used by other organizations to access your [facility] medical records  [id number]         Blood Pressure from Last 3 Encounters:   02/08/17 121/66   12/27/16 137/76   07/27/16 125/78    Weight from Last 3 Encounters:   02/08/17 86.6 kg (191 lb)   12/27/16 87.5 kg (193 lb)   07/27/16 86.2 kg (190 lb)              Today, you had the following     No orders found for display       Primary Care Provider Office Phone # Fax #    Kizzy Thomason [phone]       [facility] [address] [address] [address]        Thank you!     Thank you for choosing [facility]  for your care. Our goal is always to provide you with excellent care. Hearing back from our patients is one way we can continue to improve our services. Please take a few minutes to complete the written survey that you may receive in the mail after your visit with us. Thank you!             Your Updated Medication List - Protect others around you: Learn how to safely use, store and throw away your medicines at  www.disposemymeds.org.          This list is accurate as of: 5/23/17  9:09 AM.  Always use your most recent med list.                   Brand Name Dispense Instructions for use    Bromfenac Sodium Powd   Start taking on:  6/5/2017     1 Bottle    1 Bottle 4 times daily 1 drop four times a day into the operative eye starting 1 day before surgery. Use until bottle runs out.       CALCIUM 600+D PO      2 tablet daily       Dexamethasone Acetate Powd   Start taking on:  6/5/2017     1 Bottle    1 Bottle 4 times daily 1 drop four times a day into the operative eye starting 1 day before surgery. Use until bottle runs out.       lisinopril 2.5 MG tablet    PRINIVIL/Zestril    90 tablet    Take 1 tablet (2.5 mg) by mouth daily       Moxifloxacin HCl Powd   Start taking on:  6/5/2017     1 Bottle    1 Bottle 4 times daily 1 drop four times a day into the operative eye starting 1 day before surgery. Use until bottle runs out.       MULTIVITAMIN GUMMIES ADULT PO          triamterene-hydrochlorothiazide 37.5-25 MG per capsule    DYAZIDE    90 capsule    Take 1 capsule by mouth every morning

## 2017-06-01 ENCOUNTER — OFFICE VISIT (OUTPATIENT)
Dept: FAMILY MEDICINE | Facility: CLINIC | Age: 67
End: 2017-06-01
Payer: MEDICARE

## 2017-06-01 VITALS
DIASTOLIC BLOOD PRESSURE: 82 MMHG | WEIGHT: 194 LBS | HEART RATE: 86 BPM | HEIGHT: 63 IN | BODY MASS INDEX: 34.38 KG/M2 | TEMPERATURE: 96.8 F | SYSTOLIC BLOOD PRESSURE: 138 MMHG

## 2017-06-01 DIAGNOSIS — Z01.818 PREOP GENERAL PHYSICAL EXAM: Primary | ICD-10-CM

## 2017-06-01 DIAGNOSIS — H26.8 PSEUDOEXFOLIATION OF LENS CAPSULE: ICD-10-CM

## 2017-06-01 PROCEDURE — 99214 OFFICE O/P EST MOD 30 MIN: CPT | Performed by: FAMILY MEDICINE

## 2017-06-01 NOTE — PROGRESS NOTES
[facility]  [address] [address] [phone]  Dept: [phone]    PRE-OP EVALUATION:  Today's date: 2017    Marisol Orta (: 1950) presents for pre-operative evaluation assessment as requested by Dr. Jiménez.  She requires evaluation and anesthesia risk assessment prior to undergoing surgery/procedure for treatment of Cataract .  Proposed procedure: bilateral cataracts     Date of Surgery/ Procedure: 17 and 17  Time of Surgery/ Procedure: 2:30 and 12:45    Hospital/Surgical Facility: [facility]  Fax number for surgical facility:   Primary Physician: Kizzy Thomason  Type of Anesthesia Anticipated: Local with MAC    Patient has a Health Care Directive or Living Will:  NO    1. NO - Do you have a history of heart attack, stroke, stent, bypass or surgery on an artery in the head, neck, heart or legs?  2. NO - Do you ever have any pain or discomfort in your chest?  3. NO - Do you have a history of  Heart Failure?  4. NO - Are you troubled by shortness of breath when: walking on the level, up a slight hill or at night?  5. NO - Do you currently have a cold, bronchitis or other respiratory infection?  6. NO - Do you have a cough, shortness of breath or wheezing?  7. NO - Do you sometimes get pains in the calves of your legs when you walk?  8. NO - Do you or anyone in your family have previous history of blood clots?  9. NO - Do you or does anyone in your family have a serious bleeding problem such as prolonged bleeding following surgeries or cuts?  10. YES - HAVE YOU EVER HAD PROBLEMS WITH ANEMIA OR BEEN TOLD TO TAKE IRON PILLS? During pregnancy   11. NO - Have you had any abnormal blood loss such as black, tarry or bloody stools, or abnormal vaginal bleeding?  12. NO - Have you ever had a blood transfusion?  13. NO - Have you or any of your relatives ever had problems with anesthesia?  14. NO - Do you have sleep apnea, excessive snoring or daytime  drowsiness?  15. NO - Do you have any prosthetic heart valves?  16. NO - Do you have prosthetic joints?  17. NO - Is there any chance that you may be pregnant?      HPI:                                                      Brief HPI related to upcoming procedure: pt with bilateral cataracts causing visual impairment.  Having bilateral cataract removal      HYPERTENSION - Patient has longstanding history of mod-severe HTN , currently denies any symptoms referable to elevated blood pressure. Specifically denies chest pain, palpitations, dyspnea, orthopnea, PND or peripheral edema. Blood pressure readings have been in normal range. Current medication regimen is as listed below. Patient denies any side effects of medication.                                                                                                                                                                                          .    MEDICAL HISTORY:                                                      Patient Active Problem List    Diagnosis Date Noted     Combined form of age-related cataract, both eyes 05/03/2017     Priority: Medium     CARDIOVASCULAR SCREENING; LDL GOAL LESS THAN 160 10/13/2015     Priority: Medium     Pseudoexfoliation of lens capsule, bilateral 10/11/2012     Priority: Medium     Advanced directives, counseling/discussion 08/16/2011     Priority: Medium     Discussed Advance Directive planning with patient; information given to patient to review.  Azra Lazar CMA           Vitreous membranes and strands 08/03/2011     Priority: Medium     Hypertension goal BP (blood pressure) < 140/90 05/27/2011     Priority: Medium     Back pain 06/02/2010     Priority: Medium     Osteoarthritis 06/02/2010     Priority: Medium     Sees Dr Quevedo        Past Medical History:   Diagnosis Date     Arthritis     osteoarthritis     Back pain      HTN (hypertension)      Hyperlipidemia LDL goal < 130      Past Surgical History:  "  Procedure Laterality Date     APPENDECTOMY       COLONOSCOPY  2012?     FOOT SURGERY      left foot 1st  fusion     ORTHOPEDIC SURGERY       Current Outpatient Prescriptions   Medication Sig Dispense Refill     triamterene-hydrochlorothiazide (DYAZIDE) 37.5-25 MG per capsule Take 1 capsule by mouth every morning 90 capsule 1     lisinopril (PRINIVIL/ZESTRIL) 2.5 MG tablet Take 1 tablet (2.5 mg) by mouth daily 90 tablet 1     Multiple Vitamins-Minerals (MULTIVITAMIN GUMMIES ADULT PO)        CALCIUM 600+D PO 2 tablet daily       [START ON 6/5/2017] Bromfenac Sodium POWD 1 Bottle 4 times daily 1 drop four times a day into the operative eye starting 1 day before surgery. Use until bottle runs out. (Patient not taking: Reported on 6/1/2017) 1 Bottle 1     [START ON 6/5/2017] Dexamethasone Acetate POWD 1 Bottle 4 times daily 1 drop four times a day into the operative eye starting 1 day before surgery. Use until bottle runs out. (Patient not taking: Reported on 6/1/2017) 1 Bottle 1     [START ON 6/5/2017] Moxifloxacin HCl POWD 1 Bottle 4 times daily 1 drop four times a day into the operative eye starting 1 day before surgery. Use until bottle runs out. (Patient not taking: Reported on 6/1/2017) 1 Bottle 1     OTC products: None, except as noted above    Allergies   Allergen Reactions     Nkda [No Known Drug Allergies]       Latex Allergy: NO    Social History   Substance Use Topics     Smoking status: Never Smoker     Smokeless tobacco: Never Used      Comment: smoke free household     Alcohol use No     History   Drug Use No       REVIEW OF SYSTEMS:                                                    C: NEGATIVE for fever, chills, change in weight  E/M: NEGATIVE for ear, mouth and throat problems  R: NEGATIVE for significant cough or SOB  CV: NEGATIVE for chest pain, palpitations or peripheral edema    EXAM:                                                    /82  Pulse 86  Temp 96.8  F (36  C) (Oral)  Ht 5' 3\" " (1.6 m)  Wt 194 lb (88 kg)  BMI 34.37 kg/m2  GENERAL APPEARANCE: healthy, alert and no distress  HENT: ear canals and TM's normal and nose and mouth without ulcers or lesions  RESP: lungs clear to auscultation - no rales, rhonchi or wheezes  CV: regular rate and rhythm, normal S1 S2, no S3 or S4 and no murmur, click or rub   ABDOMEN: soft, nontender, no HSM or masses and bowel sounds normal  NEURO: Normal strength and tone, mentation intact, speech normal and cranial nerves 2-12 intact    DIAGNOSTICS:                                                    No labs or EKG required for low risk surgery (cataract, skin procedure, breast biopsy, etc)    Recent Labs   Lab Test  12/20/16   0930  06/30/16   1136   06/02/10   1201   HGB   --   14.0   --   13.7   PLT   --   251   --   250   NA  141  141   < >  142   POTASSIUM  3.2*  3.7   < >  3.6   CR  0.64  0.69   < >  0.77    < > = values in this interval not displayed.        IMPRESSION:                                                    Reason for surgery/procedure: pt with bilateral cataracts causing visual impairment.  Having bilateral cataract removal      The proposed surgical procedure is considered LOW risk.    REVISED CARDIAC RISK INDEX  The patient has the following serious cardiovascular risks for perioperative complications such as (MI, PE, VFib and 3  AV Block):  No serious cardiac risks  INTERPRETATION: 0 risks: Class I (very low risk - 0.4% complication rate)    The patient has the following additional risks for perioperative complications:  No identified additional risks      ICD-10-CM    1. Preop general physical exam Z01.818    2. Pseudoexfoliation of lens capsule, bilateral H25.89        RECOMMENDATIONS:                                                          --Patient is to take all scheduled medications on the day of surgery.    APPROVAL GIVEN to proceed with proposed procedure, without further diagnostic evaluation       Signed Electronically by: Kizzy  SAUL Thomason MD    Copy of this evaluation report is provided to requesting physician.    [facility] Preop Guidelines

## 2017-06-01 NOTE — NURSING NOTE
"Chief Complaint   Patient presents with     Pre-Op Exam       Initial /82  Pulse 86  Temp 96.8  F (36  C) (Oral)  Ht 5' 3\" (1.6 m)  Wt 194 lb (88 kg)  BMI 34.37 kg/m2 Estimated body mass index is 34.37 kg/(m^2) as calculated from the following:    Height as of this encounter: 5' 3\" (1.6 m).    Weight as of this encounter: 194 lb (88 kg).  Medication Reconciliation: complete  Azra Lazar , CMA     "

## 2017-06-01 NOTE — MR AVS SNAPSHOT
After Visit Summary   6/1/2017    Marisol Orta    MRN: [id number]           Patient Information     Date Of Birth          1950        Visit Information        Provider Department      6/1/2017 10:55 AM Kizzy Thomason MD [facility]        Today's Diagnoses     Preop general physical exam    -  1    Pseudoexfoliation of lens capsule, bilateral          Care Instructions      Before Your Surgery      Call your surgeon if there is any change in your health. This includes signs of a cold or flu (such as a sore throat, runny nose, cough, rash or fever).    Do not smoke, drink alcohol or take over the counter medicine (unless your surgeon or primary care doctor tells you to) for the 24 hours before and after surgery.    If you take prescribed drugs: Follow your doctor s orders about which medicines to take and which to stop until after surgery.    Eating and drinking prior to surgery: follow the instructions from your surgeon    Take a shower or bath the night before surgery. Use the soap your surgeon gave you to gently clean your skin. If you do not have soap from your surgeon, use your regular soap. Do not shave or scrub the surgery site.  Wear clean pajamas and have clean sheets on your bed.           Follow-ups after your visit        Your next 10 appointments already scheduled     Jun 06, 2017   Procedure with Caprice Jiménez MD   [facility] (--)    [address]  [facility] [address]   [phone]            Jun 07, 2017  2:00 PM CDT   Return Visit with Caprice Jiménez MD   [facility] ([facility]    [address] [facility] [address]   [phone]            Jun 14, 2017  9:45 AM CDT   Return Visit with Caprice Jiménez MD   [facility] ([facility]    [address] [facility] [address]   [phone]            Jun 19, 2017  7:45 AM CDT   LAB with AN  LAB   [facility] ([facility])    [address] [facility] [address] [phone]           Patient must bring picture ID.  Patient should be prepared to give a urine specimen  Please do not eat 10-12 hours before your appointment if you are coming in fasting for labs on lipids, cholesterol, or glucose (sugar).  Pregnant women should follow their Care Team instructions. Water with medications is okay. Do not drink coffee or other fluids.   If you have concerns about taking  your medications, please ask at office or if scheduling via [employer], send a message by clicking on Secure Messaging, Message Your Care Team.            Jun 20, 2017   Procedure with Caprice Jiménez MD   [facility] (--)    [address]  [facility] [address]   [phone]            Jun 21, 2017 12:45 PM CDT   Return Visit with Caprice Jiménez MD   [facility]    [address] [facility] [address] [phone]            Jun 27, 2017  8:15 AM CDT   PHYSICAL with Kizzy Thomason MD   [facility] ([facility])    [address] [facility] [facility] [address] [facility] [address] [phone]            Jun 27, 2017 12:45 PM CDT   Return Visit with Cap[facility] MD   [facility]    [address] [facility] [address] [phone]            Jul 28, 2017  8:45 AM CDT   Return Visit with Caprice Jiménez MD   [facility])    [address] [facility] [address] [phone]              Who to contact     If you have questions or need follow up information about today's clinic visit or your schedule please contact [facility] directly at [phone].  Normal or non-critical lab and imaging results will be communicated to you by MyChart, letter or phone within 4 business days after the clinic  "has received the results. If you do not hear from us within 7 days, please contact the clinic through [employer] or phone. If you have a critical or abnormal lab result, we will notify you by phone as soon as possible.  Submit refill requests through [employer] or call your pharmacy and they will forward the refill request to us. Please allow 3 business days for your refill to be completed.          Additional Information About Your Visit        [employer] Information     [employer] gives you secure access to your electronic health record. If you see a primary care provider, you can also send messages to your care team and make appointments. If you have questions, please call your primary care clinic.  If you do not have a primary care provider, please call [phone] and they will assist you.        Care EveryWhere ID     This is your Care EveryWhere ID. This could be used by other organizations to access your [facility] medical records  [id number]        Your Vitals Were     Pulse Temperature Height BMI (Body Mass Index)          86 96.8  F (36  C) (Oral) 5' 3\" (1.6 m) 34.37 kg/m2         Blood Pressure from Last 3 Encounters:   06/01/17 138/82   02/08/17 121/66   12/27/16 137/76    Weight from Last 3 Encounters:   06/01/17 194 lb (88 kg)   02/08/17 191 lb (86.6 kg)   12/27/16 193 lb (87.5 kg)              Today, you had the following     No orders found for display       Primary Care Provider Office Phone # Fax #    Kizzy Thomason [phone]       [facility] [address] [facility] [address]        Thank you!     Thank you for choosing [facility]  for your care. Our goal is always to provide you with excellent care. Hearing back from our patients is one way we can continue to improve our services. Please take a few minutes to complete the written survey that you may receive in the mail after your visit with us. Thank you!             Your Updated Medication List - " Protect others around you: Learn how to safely use, store and throw away your medicines at www.disposemymeds.org.          This list is accurate as of: 6/1/17 11:19 AM.  Always use your most recent med list.                   Brand Name Dispense Instructions for use    Bromfenac Sodium Powd   Start taking on:  6/5/2017     1 Bottle    1 Bottle 4 times daily 1 drop four times a day into the operative eye starting 1 day before surgery. Use until bottle runs out.       CALCIUM 600+D PO      2 tablet daily       Dexamethasone Acetate Powd   Start taking on:  6/5/2017     1 Bottle    1 Bottle 4 times daily 1 drop four times a day into the operative eye starting 1 day before surgery. Use until bottle runs out.       lisinopril 2.5 MG tablet    PRINIVIL/Zestril    90 tablet    Take 1 tablet (2.5 mg) by mouth daily       Moxifloxacin HCl Powd   Start taking on:  6/5/2017     1 Bottle    1 Bottle 4 times daily 1 drop four times a day into the operative eye starting 1 day before surgery. Use until bottle runs out.       MULTIVITAMIN GUMMIES ADULT PO          triamterene-hydrochlorothiazide 37.5-25 MG per capsule    DYAZIDE    90 capsule    Take 1 capsule by mouth every morning

## 2017-06-05 NOTE — H&P (VIEW-ONLY)
[facility]  [address] [facility] [id number] [phone]  Dept: [phone]    PRE-OP EVALUATION:  Today's date: 2017    Marisol Orta (: 1950) presents for pre-operative evaluation assessment as requested by Dr. Jiménez.  She requires evaluation and anesthesia risk assessment prior to undergoing surgery/procedure for treatment of Cataract .  Proposed procedure: bilateral cataracts     Date of Surgery/ Procedure: 17 and 17  Time of Surgery/ Procedure: 2:30 and 12:45    Hospital/Surgical Facility: [facility]  Fax number for surgical facility:   Primary Physician: Kizzy Thomason  Type of Anesthesia Anticipated: Local with MAC    Patient has a Health Care Directive or Living Will:  NO    1. NO - Do you have a history of heart attack, stroke, stent, bypass or surgery on an artery in the head, neck, heart or legs?  2. NO - Do you ever have any pain or discomfort in your chest?  3. NO - Do you have a history of  Heart Failure?  4. NO - Are you troubled by shortness of breath when: walking on the level, up a slight hill or at night?  5. NO - Do you currently have a cold, bronchitis or other respiratory infection?  6. NO - Do you have a cough, shortness of breath or wheezing?  7. NO - Do you sometimes get pains in the calves of your legs when you walk?  8. NO - Do you or anyone in your family have previous history of blood clots?  9. NO - Do you or does anyone in your family have a serious bleeding problem such as prolonged bleeding following surgeries or cuts?  10. YES - HAVE YOU EVER HAD PROBLEMS WITH ANEMIA OR BEEN TOLD TO TAKE IRON PILLS? During pregnancy   11. NO - Have you had any abnormal blood loss such as black, tarry or bloody stools, or abnormal vaginal bleeding?  12. NO - Have you ever had a blood transfusion?  13. NO - Have you or any of your relatives ever had problems with anesthesia?  14. NO - Do you have sleep apnea, excessive snoring or daytime  drowsiness?  15. NO - Do you have any prosthetic heart valves?  16. NO - Do you have prosthetic joints?  17. NO - Is there any chance that you may be pregnant?      HPI:                                                      Brief HPI related to upcoming procedure: pt with bilateral cataracts causing visual impairment.  Having bilateral cataract removal      HYPERTENSION - Patient has longstanding history of mod-severe HTN , currently denies any symptoms referable to elevated blood pressure. Specifically denies chest pain, palpitations, dyspnea, orthopnea, PND or peripheral edema. Blood pressure readings have been in normal range. Current medication regimen is as listed below. Patient denies any side effects of medication.                                                                                                                                                                                          .    MEDICAL HISTORY:                                                      Patient Active Problem List    Diagnosis Date Noted     Combined form of age-related cataract, both eyes 05/03/2017     Priority: Medium     CARDIOVASCULAR SCREENING; LDL GOAL LESS THAN 160 10/13/2015     Priority: Medium     Pseudoexfoliation of lens capsule, bilateral 10/11/2012     Priority: Medium     Advanced directives, counseling/discussion 08/16/2011     Priority: Medium     Discussed Advance Directive planning with patient; information given to patient to review.  Azra Lazar CMA           Vitreous membranes and strands 08/03/2011     Priority: Medium     Hypertension goal BP (blood pressure) < 140/90 05/27/2011     Priority: Medium     Back pain 06/02/2010     Priority: Medium     Osteoarthritis 06/02/2010     Priority: Medium     Sees Dr Quevedo        Past Medical History:   Diagnosis Date     Arthritis     osteoarthritis     Back pain      HTN (hypertension)      Hyperlipidemia LDL goal < 130      Past Surgical History:  "  Procedure Laterality Date     APPENDECTOMY       COLONOSCOPY  2012?     FOOT SURGERY      left foot 1st  fusion     ORTHOPEDIC SURGERY       Current Outpatient Prescriptions   Medication Sig Dispense Refill     triamterene-hydrochlorothiazide (DYAZIDE) 37.5-25 MG per capsule Take 1 capsule by mouth every morning 90 capsule 1     lisinopril (PRINIVIL/ZESTRIL) 2.5 MG tablet Take 1 tablet (2.5 mg) by mouth daily 90 tablet 1     Multiple Vitamins-Minerals (MULTIVITAMIN GUMMIES ADULT PO)        CALCIUM 600+D PO 2 tablet daily       [START ON 6/5/2017] Bromfenac Sodium POWD 1 Bottle 4 times daily 1 drop four times a day into the operative eye starting 1 day before surgery. Use until bottle runs out. (Patient not taking: Reported on 6/1/2017) 1 Bottle 1     [START ON 6/5/2017] Dexamethasone Acetate POWD 1 Bottle 4 times daily 1 drop four times a day into the operative eye starting 1 day before surgery. Use until bottle runs out. (Patient not taking: Reported on 6/1/2017) 1 Bottle 1     [START ON 6/5/2017] Moxifloxacin HCl POWD 1 Bottle 4 times daily 1 drop four times a day into the operative eye starting 1 day before surgery. Use until bottle runs out. (Patient not taking: Reported on 6/1/2017) 1 Bottle 1     OTC products: None, except as noted above    Allergies   Allergen Reactions     Nkda [No Known Drug Allergies]       Latex Allergy: NO    Social History   Substance Use Topics     Smoking status: Never Smoker     Smokeless tobacco: Never Used      Comment: smoke free household     Alcohol use No     History   Drug Use No       REVIEW OF SYSTEMS:                                                    C: NEGATIVE for fever, chills, change in weight  E/M: NEGATIVE for ear, mouth and throat problems  R: NEGATIVE for significant cough or SOB  CV: NEGATIVE for chest pain, palpitations or peripheral edema    EXAM:                                                    /82  Pulse 86  Temp 96.8  F (36  C) (Oral)  Ht 5' 3\" " (1.6 m)  Wt 194 lb (88 kg)  BMI 34.37 kg/m2  GENERAL APPEARANCE: healthy, alert and no distress  HENT: ear canals and TM's normal and nose and mouth without ulcers or lesions  RESP: lungs clear to auscultation - no rales, rhonchi or wheezes  CV: regular rate and rhythm, normal S1 S2, no S3 or S4 and no murmur, click or rub   ABDOMEN: soft, nontender, no HSM or masses and bowel sounds normal  NEURO: Normal strength and tone, mentation intact, speech normal and cranial nerves 2-12 intact    DIAGNOSTICS:                                                    No labs or EKG required for low risk surgery (cataract, skin procedure, breast biopsy, etc)    Recent Labs   Lab Test  12/20/16   0930  06/30/16   1136   06/02/10   1201   HGB   --   14.0   --   13.7   PLT   --   251   --   250   NA  141  141   < >  142   POTASSIUM  3.2*  3.7   < >  3.6   CR  0.64  0.69   < >  0.77    < > = values in this interval not displayed.        IMPRESSION:                                                    Reason for surgery/procedure: pt with bilateral cataracts causing visual impairment.  Having bilateral cataract removal      The proposed surgical procedure is considered LOW risk.    REVISED CARDIAC RISK INDEX  The patient has the following serious cardiovascular risks for perioperative complications such as (MI, PE, VFib and 3  AV Block):  No serious cardiac risks  INTERPRETATION: 0 risks: Class I (very low risk - 0.4% complication rate)    The patient has the following additional risks for perioperative complications:  No identified additional risks      ICD-10-CM    1. Preop general physical exam Z01.818    2. Pseudoexfoliation of lens capsule, bilateral H25.89        RECOMMENDATIONS:                                                          --Patient is to take all scheduled medications on the day of surgery.    APPROVAL GIVEN to proceed with proposed procedure, without further diagnostic evaluation       Signed Electronically by: Kizzy  SAUL Thomason MD    Copy of this evaluation report is provided to requesting physician.    [facility] Preop Guidelines

## 2017-06-06 ENCOUNTER — ANESTHESIA (OUTPATIENT)
Dept: SURGERY | Facility: CLINIC | Age: 67
End: 2017-06-06
Payer: MEDICARE

## 2017-06-06 ENCOUNTER — SURGERY (OUTPATIENT)
Age: 67
End: 2017-06-06

## 2017-06-06 ENCOUNTER — ANESTHESIA EVENT (OUTPATIENT)
Dept: SURGERY | Facility: CLINIC | Age: 67
End: 2017-06-06
Payer: MEDICARE

## 2017-06-06 ENCOUNTER — HOSPITAL ENCOUNTER (OUTPATIENT)
Facility: CLINIC | Age: 67
Discharge: HOME | End: 2017-06-06
Attending: STUDENT IN AN ORGANIZED HEALTH CARE EDUCATION/TRAINING PROGRAM | Admitting: STUDENT IN AN ORGANIZED HEALTH CARE EDUCATION/TRAINING PROGRAM
Payer: MEDICARE

## 2017-06-06 VITALS
SYSTOLIC BLOOD PRESSURE: 116 MMHG | RESPIRATION RATE: 16 BRPM | DIASTOLIC BLOOD PRESSURE: 66 MMHG | TEMPERATURE: 96.9 F | OXYGEN SATURATION: 99 %

## 2017-06-06 PROCEDURE — 36000102 ZZH EYE SURGERY LEVEL 3 EA 15 ADDTL MIN: Performed by: STUDENT IN AN ORGANIZED HEALTH CARE EDUCATION/TRAINING PROGRAM

## 2017-06-06 PROCEDURE — 25000128 H RX IP 250 OP 636: Performed by: ANESTHESIOLOGY

## 2017-06-06 PROCEDURE — 66984 XCAPSL CTRC RMVL W/O ECP: CPT | Mod: LT | Performed by: STUDENT IN AN ORGANIZED HEALTH CARE EDUCATION/TRAINING PROGRAM

## 2017-06-06 PROCEDURE — 71000028 ZZH EYE RECOVERY PHASE 2 EACH 15 MINS: Performed by: STUDENT IN AN ORGANIZED HEALTH CARE EDUCATION/TRAINING PROGRAM

## 2017-06-06 PROCEDURE — 25000125 ZZHC RX 250: Performed by: STUDENT IN AN ORGANIZED HEALTH CARE EDUCATION/TRAINING PROGRAM

## 2017-06-06 PROCEDURE — 27210794 ZZH OR GENERAL SUPPLY STERILE: Performed by: STUDENT IN AN ORGANIZED HEALTH CARE EDUCATION/TRAINING PROGRAM

## 2017-06-06 PROCEDURE — 25000132 ZZH RX MED GY IP 250 OP 250 PS 637: Mod: GY | Performed by: STUDENT IN AN ORGANIZED HEALTH CARE EDUCATION/TRAINING PROGRAM

## 2017-06-06 PROCEDURE — 25000128 H RX IP 250 OP 636: Performed by: STUDENT IN AN ORGANIZED HEALTH CARE EDUCATION/TRAINING PROGRAM

## 2017-06-06 PROCEDURE — 40000170 ZZH STATISTIC PRE-PROCEDURE ASSESSMENT II: Performed by: STUDENT IN AN ORGANIZED HEALTH CARE EDUCATION/TRAINING PROGRAM

## 2017-06-06 PROCEDURE — 25000128 H RX IP 250 OP 636: Performed by: NURSE ANESTHETIST, CERTIFIED REGISTERED

## 2017-06-06 PROCEDURE — 36000101 ZZH EYE SURGERY LEVEL 3 1ST 30 MIN: Performed by: STUDENT IN AN ORGANIZED HEALTH CARE EDUCATION/TRAINING PROGRAM

## 2017-06-06 PROCEDURE — 25000125 ZZHC RX 250: Performed by: ANESTHESIOLOGY

## 2017-06-06 PROCEDURE — V2632 POST CHMBR INTRAOCULAR LENS: HCPCS | Performed by: STUDENT IN AN ORGANIZED HEALTH CARE EDUCATION/TRAINING PROGRAM

## 2017-06-06 PROCEDURE — 37000008 ZZH ANESTHESIA TECHNICAL FEE, 1ST 30 MIN: Performed by: STUDENT IN AN ORGANIZED HEALTH CARE EDUCATION/TRAINING PROGRAM

## 2017-06-06 PROCEDURE — 37000009 ZZH ANESTHESIA TECHNICAL FEE, EACH ADDTL 15 MIN: Performed by: STUDENT IN AN ORGANIZED HEALTH CARE EDUCATION/TRAINING PROGRAM

## 2017-06-06 DEVICE — EYE IMP IOL AMO PCL TECNIS ZCB00 16.5: Type: IMPLANTABLE DEVICE | Site: EYE | Status: FUNCTIONAL

## 2017-06-06 RX ORDER — PHENYLEPHRINE HYDROCHLORIDE 25 MG/ML
1 SOLUTION/ DROPS OPHTHALMIC
Status: COMPLETED | OUTPATIENT
Start: 2017-06-06 | End: 2017-06-06

## 2017-06-06 RX ORDER — BALANCED SALT SOLUTION 6.4; .75; .48; .3; 3.9; 1.7 MG/ML; MG/ML; MG/ML; MG/ML; MG/ML; MG/ML
SOLUTION OPHTHALMIC PRN
Status: DISCONTINUED | OUTPATIENT
Start: 2017-06-06 | End: 2017-06-06 | Stop reason: HOSPADM

## 2017-06-06 RX ORDER — CYCLOPENTOLATE HYDROCHLORIDE 10 MG/ML
1 SOLUTION/ DROPS OPHTHALMIC
Status: COMPLETED | OUTPATIENT
Start: 2017-06-06 | End: 2017-06-06

## 2017-06-06 RX ORDER — TRIAMTERENE AND HYDROCHLOROTHIAZIDE 37.5; 25 MG/1; MG/1
1 CAPSULE ORAL DAILY
COMMUNITY
Start: 2008-05-14 | End: 2017-06-27

## 2017-06-06 RX ORDER — MULTIPLE VITAMINS W/ MINERALS TAB 9MG-400MCG
1 TAB ORAL DAILY
Status: ON HOLD | COMMUNITY
Start: 2008-05-14 | End: 2017-06-20

## 2017-06-06 RX ORDER — ONDANSETRON 2 MG/ML
INJECTION INTRAMUSCULAR; INTRAVENOUS PRN
Status: DISCONTINUED | OUTPATIENT
Start: 2017-06-06 | End: 2017-06-06

## 2017-06-06 RX ORDER — FLURBIPROFEN SODIUM 0.3 MG/ML
1 SOLUTION/ DROPS OPHTHALMIC
Status: DISCONTINUED | OUTPATIENT
Start: 2017-06-06 | End: 2017-06-06 | Stop reason: HOSPADM

## 2017-06-06 RX ORDER — SODIUM CHLORIDE, SODIUM LACTATE, POTASSIUM CHLORIDE, CALCIUM CHLORIDE 600; 310; 30; 20 MG/100ML; MG/100ML; MG/100ML; MG/100ML
500 INJECTION, SOLUTION INTRAVENOUS CONTINUOUS
Status: DISCONTINUED | OUTPATIENT
Start: 2017-06-06 | End: 2017-06-06 | Stop reason: HOSPADM

## 2017-06-06 RX ORDER — PROPARACAINE HYDROCHLORIDE 5 MG/ML
1 SOLUTION/ DROPS OPHTHALMIC ONCE
Status: COMPLETED | OUTPATIENT
Start: 2017-06-06 | End: 2017-06-06

## 2017-06-06 RX ORDER — LIDOCAINE HYDROCHLORIDE 10 MG/ML
INJECTION, SOLUTION EPIDURAL; INFILTRATION; INTRACAUDAL; PERINEURAL PRN
Status: DISCONTINUED | OUTPATIENT
Start: 2017-06-06 | End: 2017-06-06 | Stop reason: HOSPADM

## 2017-06-06 RX ORDER — PROPARACAINE HYDROCHLORIDE 5 MG/ML
SOLUTION/ DROPS OPHTHALMIC PRN
Status: DISCONTINUED | OUTPATIENT
Start: 2017-06-06 | End: 2017-06-06 | Stop reason: HOSPADM

## 2017-06-06 RX ORDER — TROPICAMIDE 10 MG/ML
1 SOLUTION/ DROPS OPHTHALMIC
Status: COMPLETED | OUTPATIENT
Start: 2017-06-06 | End: 2017-06-06

## 2017-06-06 RX ADMIN — EPINEPHRINE 500 ML: 1 INJECTION, SOLUTION, CONCENTRATE INTRAVENOUS at 14:20

## 2017-06-06 RX ADMIN — LIDOCAINE HYDROCHLORIDE 1 ML: 10 INJECTION, SOLUTION EPIDURAL; INFILTRATION; INTRACAUDAL; PERINEURAL at 13:30

## 2017-06-06 RX ADMIN — TROPICAMIDE 1 DROP: 10 SOLUTION/ DROPS OPHTHALMIC at 13:30

## 2017-06-06 RX ADMIN — PROPARACAINE HYDROCHLORIDE 2 DROP: 5 SOLUTION/ DROPS OPHTHALMIC at 14:22

## 2017-06-06 RX ADMIN — SODIUM CHLORIDE, POTASSIUM CHLORIDE, SODIUM LACTATE AND CALCIUM CHLORIDE 500 ML: 600; 310; 30; 20 INJECTION, SOLUTION INTRAVENOUS at 13:30

## 2017-06-06 RX ADMIN — LIDOCAINE HYDROCHLORIDE 1 ML: 10 INJECTION, SOLUTION EPIDURAL; INFILTRATION; INTRACAUDAL; PERINEURAL at 14:21

## 2017-06-06 RX ADMIN — TROPICAMIDE 1 DROP: 10 SOLUTION/ DROPS OPHTHALMIC at 13:14

## 2017-06-06 RX ADMIN — FLURBIPROFEN SODIUM 1 DROP: 0.3 SOLUTION/ DROPS OPHTHALMIC at 13:14

## 2017-06-06 RX ADMIN — PHENYLEPHRINE HYDROCHLORIDE 1 DROP: 2.5 SOLUTION/ DROPS OPHTHALMIC at 13:19

## 2017-06-06 RX ADMIN — CYCLOPENTOLATE HYDROCHLORIDE 1 DROP: 10 SOLUTION/ DROPS OPHTHALMIC at 13:30

## 2017-06-06 RX ADMIN — MIDAZOLAM HYDROCHLORIDE 2 MG: 1 INJECTION, SOLUTION INTRAMUSCULAR; INTRAVENOUS at 14:10

## 2017-06-06 RX ADMIN — EPINEPHRINE 0.5 ML: 1 INJECTION, SOLUTION INTRAMUSCULAR; SUBCUTANEOUS at 14:21

## 2017-06-06 RX ADMIN — CYCLOPENTOLATE HYDROCHLORIDE 1 DROP: 10 SOLUTION/ DROPS OPHTHALMIC at 13:19

## 2017-06-06 RX ADMIN — FLURBIPROFEN SODIUM 1 DROP: 0.3 SOLUTION/ DROPS OPHTHALMIC at 13:19

## 2017-06-06 RX ADMIN — FLURBIPROFEN SODIUM 1 DROP: 0.3 SOLUTION/ DROPS OPHTHALMIC at 13:30

## 2017-06-06 RX ADMIN — CYCLOPENTOLATE HYDROCHLORIDE 1 DROP: 10 SOLUTION/ DROPS OPHTHALMIC at 13:14

## 2017-06-06 RX ADMIN — TROPICAMIDE 1 DROP: 10 SOLUTION/ DROPS OPHTHALMIC at 13:19

## 2017-06-06 RX ADMIN — ONDANSETRON 4 MG: 2 INJECTION INTRAMUSCULAR; INTRAVENOUS at 14:10

## 2017-06-06 RX ADMIN — SODIUM CHONDROITIN SULFATE / SODIUM HYALURONATE 1 ML: 0.55-0.5 INJECTION INTRAOCULAR at 14:21

## 2017-06-06 RX ADMIN — PHENYLEPHRINE HYDROCHLORIDE 1 DROP: 2.5 SOLUTION/ DROPS OPHTHALMIC at 13:30

## 2017-06-06 RX ADMIN — PHENYLEPHRINE HYDROCHLORIDE 1 DROP: 2.5 SOLUTION/ DROPS OPHTHALMIC at 13:14

## 2017-06-06 RX ADMIN — BALANCED SALT SOLUTION 15 ML: 6.4; .75; .48; .3; 3.9; 1.7 SOLUTION OPHTHALMIC at 14:20

## 2017-06-06 RX ADMIN — PROPARACAINE HYDROCHLORIDE 1 DROP: 5 SOLUTION/ DROPS OPHTHALMIC at 13:14

## 2017-06-06 NOTE — DISCHARGE INSTRUCTIONS
CATARACT SURGERY POST-OP INSTRUCTIONS  Dr. Caprice Jiménez  [phone]      *Continue using CatarActive3 four times a day in the operative eye.  *You should get 3 doses in today and 4 doses daily starting tomorrow.      Keep the eye shield taped in place unless putting drops in. We will remove it for you in the office tomorrow.      Light sensitivity may be noticed. Sunglasses may be worn for comfort.      Do not rub the operated eye.      Keep the operated eye dry. You may wash your hair, bathe or shower, but keep the operated eye closed while doing so.       No swimming, hot tub, or sauna for 2 weeks.      No make up around eye for 5 days.      No bending at the waist or lifting more than 10 pounds for one week.      May take Tylenol (per directions on bottle) for mild pain.      Call Dr. Jiménez's cell at [phone]  if any of the following should occur:    o Any sudden vision changes  o Nausea or severe headache  o Increase in pain not controlled  o Or signs of infection (pus, increasing redness or tenderness)  [facility] Discharge  Instructions  Anesthesia ([facility])   Adult Discharge Instructions    For 24 hours after surgery    1. Get plenty of rest.  Make arrangements to have a responsible adult stay with you for at least 6 hours after you leave the hospital.  2. Do not drive or use heavy equipment for 24 hours.    3. Do not drink alcohol for 24 hours.  4. Do not sign legal documents or make important decisions for 24 hours.  5. Avoid strenuous or risky activities. You may feel lightheaded.  If so, sit for a few minutes before standing.  Have someone help you get up.   6. Conscious sedation patients may resume a regular diet..  7. Any questions of medical nature, call your physician.    CATARACT SURGERY POST-OP INSTRUCTIONS  Dr. Caprice Jiménez  [phone]        Start using all three eyedrops today, including   - Vigamox (tan top)   - Diclofenac (grey top)   -  Prednisolone (white or pink top)  You should get 3 doses in today and 4 doses daily starting tomorrow.  Wait a few minutes in between putting each drop in.      Keep the eye shield taped in place unless putting drops in. We will remove it for you in the office tomorrow.      Light sensitivity may be noticed. Sunglasses may be worn for comfort.      Do not rub the operated eye.      Keep the operated eye dry. You may wash your hair, bathe or shower, but keep the operated eye closed while doing so.       No swimming, hot tub, or sauna for 2 weeks.      No make up around eye for 5 days.      No bending at the waist or lifting more than 10 pounds for one week.      May take Tylenol (per directions on bottle) for mild pain.      Call Dr. Jiménez's cell at [phone]  if any of the following should occur:    o Any sudden vision changes  o Nausea or severe headache  o Increase in pain not controlled  o Or signs of infection (pus, increasing redness or tenderness)

## 2017-06-06 NOTE — IP AVS SNAPSHOT
[facility]    [address]    [place] [address]    Phone:  [phone]    Fax:  [phone]                                       After Visit Summary   6/6/2017    Marisol Orta    MRN: [id number]           After Visit Summary Signature Page     I have received my discharge instructions, and my questions have been answered. I have discussed any challenges I see with this plan with the nurse or doctor.    ..........................................................................................................................................  Patient/Patient Representative Signature      ..........................................................................................................................................  Patient Representative Print Name and Relationship to Patient    ..................................................               ................................................  Date                                            Time    ..........................................................................................................................................  Reviewed by Signature/Title    ...................................................              ..............................................  Date                                                            Time

## 2017-06-06 NOTE — ANESTHESIA CARE TRANSFER NOTE
Patient: Marisol Orta    Procedure(s):  LEFT EYE PHACOEMULSIFICATION CLEAR CORNEA WITH STANDARD INTRAOCULAR LENS IMPLANT  - Wound Class: I-Clean    Diagnosis: cataract  Diagnosis Additional Information: No value filed.    Anesthesia Type:   MAC     Note:  Airway :Room Air  Patient transferred to:PACU        Vitals: (Last set prior to Anesthesia Care Transfer)    CRNA VITALS  6/6/2017 1410 - 6/6/2017 1444      6/6/2017             Pulse: 62    Ht Rate: 59    SpO2: 100 %    Resp Rate (set): 10                Electronically Signed By: VICKIE Pozo CRNA  June 6, 2017  2:44 PM

## 2017-06-06 NOTE — IP AVS SNAPSHOT
MRN:[id number]                      After Visit Summary   6/6/2017    Marisol Orta    MRN: [id number]           Thank you!     Thank you for choosing [facility] for your care. Our goal is always to provide you with excellent care. Hearing back from our patients is one way we can continue to improve our services. Please take a few minutes to complete the written survey that you may receive in the mail after you visit with us. Thank you!        Patient Information     Date Of Birth          1950        About your hospital stay     You were admitted on:  June 6, 2017 You last received care in the:  [facility]    You were discharged on:  June 6, 2017       Who to Call     For medical emergencies, please call 911.  For non-urgent questions about your medical care, please call your primary care provider or clinic, [phone]  For questions related to your surgery, please call your surgery clinic        Attending Provider     Provider Specialty    Caprice Jiménez MD Ophthalmology       Primary Care Provider Office Phone # Fax #    Kizzy Juliana Thomason [phone]      Your next 10 appointments already scheduled     Jun 07, 2017  2:00 PM CDT   Return Visit with Caprice Jiménez MD   [facility] ([facility]    [address] [address] [phone]            Jun 14, 2017  9:45 AM CDT   Return Visit with Caprice Jiménez MD   [facility] ([facility]    [address] [facility] [address] [phone]            Jun 19, 2017  7:45 AM CDT   LAB with AN LAB   [facility] ([facility])    [address] [facility] [address] [phone]           Patient must bring picture ID.  Patient should be prepared to give a urine specimen  Please do not eat 10-12 hours before your appointment if you are coming in fasting for labs on lipids,  cholesterol, or glucose (sugar).  Pregnant women should follow their Care Team instructions. Water with medications is okay. Do not drink coffee or other fluids.   If you have concerns about taking  your medications, please ask at office or if scheduling via [employer], send a message by clicking on Secure Messaging, Message Your Care Team.            Jun 20, 2017   Procedure with Caprice Jiménez MD   [facility] (--)    [address]  [facility] [address]   [phone]            Jun 21, 2017 12:45 PM CDT   Return Visit with Caprice Jiménez MD   [facility]    [address] [facility] [address]   [phone]            Jun 27, 2017  8:15 AM CDT   PHYSICAL with Kizzy Thomason MD   [facility] ([facility])    [address] [facility] [address] [phone]            Jun 27, 2017 12:45 PM CDT   Return Visit with Caprice Jiménez MD   [facility]    [address] [facility] [address] [phone]            Jul 28, 2017  8:45 AM CDT   Return Visit with Caprice Jiménez MD   [facility] ([facility])    [address] [facility] [address]   [phone]              Further instructions from your care team       CATARACT SURGERY POST-OP INSTRUCTIONS  Dr. Caprice Jiménez  [phone]      *Continue using CatarActive3 four times a day in the operative eye.  *You should get 3 doses in today and 4 doses daily starting tomorrow.      Keep the eye shield taped in place unless putting drops in. We will remove it for you in the office tomorrow.      Light sensitivity may be noticed. Sunglasses may be worn for comfort.      Do not rub the operated eye.      Keep the operated eye dry. You may wash your hair, bathe or shower, but keep the operated eye closed while doing so.       No swimming, hot tub, or  sauna for 2 weeks.      No make up around eye for 5 days.      No bending at the waist or lifting more than 10 pounds for one week.      May take Tylenol (per directions on bottle) for mild pain.      Call Dr. Jiménez's cell at [phone]  if any of the following should occur:    o Any sudden vision changes  o Nausea or severe headache  o Increase in pain not controlled  o Or signs of infection (pus, increasing redness or tenderness)  [facility] Discharge  Instructions  Anesthesia (Eye Care Center)   Adult Discharge Instructions    For 24 hours after surgery    1. Get plenty of rest.  Make arrangements to have a responsible adult stay with you for at least 6 hours after you leave the hospital.  2. Do not drive or use heavy equipment for 24 hours.    3. Do not drink alcohol for 24 hours.  4. Do not sign legal documents or make important decisions for 24 hours.  5. Avoid strenuous or risky activities. You may feel lightheaded.  If so, sit for a few minutes before standing.  Have someone help you get up.   6. Conscious sedation patients may resume a regular diet..  7. Any questions of medical nature, call your physician.    CATARACT SURGERY POST-OP INSTRUCTIONS  Dr. Caprice Jiménez  [phone]        Start using all three eyedrops today, including   - Vigamox (tan top)   - Diclofenac (grey top)   - Prednisolone (white or pink top)  You should get 3 doses in today and 4 doses daily starting tomorrow.  Wait a few minutes in between putting each drop in.      Keep the eye shield taped in place unless putting drops in. We will remove it for you in the office tomorrow.      Light sensitivity may be noticed. Sunglasses may be worn for comfort.      Do not rub the operated eye.      Keep the operated eye dry. You may wash your hair, bathe or shower, but keep the operated eye closed while doing so.       No swimming, hot tub, or sauna for 2 weeks.      No make up around eye for 5  days.      No bending at the waist or lifting more than 10 pounds for one week.      May take Tylenol (per directions on bottle) for mild pain.      Call Dr. Jiménez's cell at [phone]  if any of the following should occur:    o Any sudden vision changes  o Nausea or severe headache  o Increase in pain not controlled  o Or signs of infection (pus, increasing redness or tenderness)    Pending Results     No orders found from 6/4/2017 to 6/7/2017.            Admission Information     Date & Time Provider Department Dept. Phone    6/6/2017 Caprice Jiménez MD [facility] [phone]      Your Vitals Were     Blood Pressure Temperature Respirations Pulse Oximetry          120/74 96.9  F (36.1  C) (Temporal) 16 100%        MyChart Information     [employer] gives you secure access to your electronic health record. If you see a primary care provider, you can also send messages to your care team and make appointments. If you have questions, please call your primary care clinic.  If you do not have a primary care provider, please call [phone] and they will assist you.        Care EveryWhere ID     This is your Care EveryWhere ID. This could be used by other organizations to access your [facility] medical records  [id number]           Review of your medicines      UNREVIEWED medicines. 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 Use until bottle runs out.   Quantity:  1 Bottle   Refills:  1       * CALCIUM 600+D 600-200 MG-UNIT Tabs   Generic drug:  calcium carbonate-vitamin D        Dose:  1 tablet   Take 1 tablet by mouth daily   Refills:  0       * CALCIUM 600+D PO        2 tablet daily   Refills:  0       Dexamethasone Acetate Powd   Used for:  Combined form of  age-related cataract, both eyes        Dose:  1 Bottle   1 Bottle 4 times daily 1 drop four times a day into the operative eye starting 1 day before surgery. Use until bottle runs out.   Quantity:  1 Bottle   Refills:  1       lisinopril 2.5 MG tablet   Commonly known as:  PRINIVIL/Zestril   Used for:  Essential hypertension with goal blood pressure less than 140/90        Dose:  2.5 mg   Take 1 tablet (2.5 mg) by mouth daily   Quantity:  90 tablet   Refills:  1       Moxifloxacin HCl Powd   Used for:  Combined form of age-related cataract, both eyes        Dose:  1 Bottle   1 Bottle 4 times daily 1 drop four times a day into the operative eye starting 1 day before surgery. Use until bottle runs out.   Quantity:  1 Bottle   Refills:  1       * MULTIVITAMIN GUMMIES ADULT PO        Refills:  0       * Multi-vitamin Tabs tablet        Dose:  1 tablet   Take 1 tablet by mouth daily   Refills:  0       * triamterene-hydrochlorothiazide 37.5-25 MG per capsule   Commonly known as:  DYAZIDE        Dose:  1 capsule   Take 1 capsule by mouth daily   Refills:  0       * triamterene-hydrochlorothiazide 37.5-25 MG per capsule   Commonly known as:  DYAZIDE   Used for:  Essential hypertension with goal blood pressure less than 140/90        Dose:  1 capsule   Take 1 capsule by mouth every morning   Quantity:  90 capsule   Refills:  1       * Notice:  This list has 6 medication(s) that are the same as other medications prescribed for you. Read the directions carefully, and ask your doctor or other care provider to review them with you.             Protect others around you: Learn how to safely use, store and throw away your medicines at www.disposemymeds.org.             Medication List: This is a list of all your medications and when to take them. Check marks below indicate your daily home schedule. Keep this list as a reference.      Medications           Morning Afternoon Evening Bedtime As Needed    Bromfenac Sodium Powd   1  Bottle 4 times daily 1 drop four times a day into the operative eye starting 1 day before surgery. Use until bottle runs out.                                * CALCIUM 600+D 600-200 MG-UNIT Tabs   Take 1 tablet by mouth daily   Generic drug:  calcium carbonate-vitamin D                                * CALCIUM 600+D PO   2 tablet daily                                Dexamethasone Acetate Powd   1 Bottle 4 times daily 1 drop four times a day into the operative eye starting 1 day before surgery. Use until bottle runs out.                                lisinopril 2.5 MG tablet   Commonly known as:  PRINIVIL/Zestril   Take 1 tablet (2.5 mg) by mouth daily                                Moxifloxacin HCl Powd   1 Bottle 4 times daily 1 drop four times a day into the operative eye starting 1 day before surgery. Use until bottle runs out.                                * MULTIVITAMIN GUMMIES ADULT PO                                * Multi-vitamin Tabs tablet   Take 1 tablet by mouth daily                                * triamterene-hydrochlorothiazide 37.5-25 MG per capsule   Commonly known as:  DYAZIDE   Take 1 capsule by mouth daily                                * triamterene-hydrochlorothiazide 37.5-25 MG per capsule   Commonly known as:  DYAZIDE   Take 1 capsule by mouth every morning                                * Notice:  This list has 6 medication(s) that are the same as other medications prescribed for you. Read the directions carefully, and ask your doctor or other care provider to review them with you.

## 2017-06-06 NOTE — OP NOTE
PreOp Diagnosis: Visually significant nuclear sclerotic cataract left eye  PostOp Diagnosis: Same  Surgeon: Caprice Jiménez MD  Implant: Technis [id number] 16.5D    Procedures:   1. Review of intraocular lens calculations, both eyes   2. Phacoemulsification and extraction of lens  left eye   3. Intraocular lens implantation left eye  Anesthesia: MAC/topical  Complications: None  EBL: <1cc    Marisol Orta suffers from a visually significant cataract of the left eye. This has caused problems with distance and reading vision, including glare. After discussing the risks, benefits, and alternatives, the patient wishes to proceed with cataract surgery.    The patient was identified in the pre-op area where the left eye was marked. The patient was then brought to the operating room where a time out was called, identifying the patient, the procedure, and the correct site. Tetracaine drops were applied to both eyes. The operative eye was then prepped and draped in the usual sterile ophthalmic fashion. An eyelid speculum was placed into the operative eye. Additional tetracaine drops were applied. A paracentesis was made inferior with a supersharp blade. 1% preservative-free lidocaine was injected into the anterior chamber. 1:3 epinephrine:BSS was injected into the anterior chamber.  Viscoat was injected to deepen the anterior chamber. A 2.4mm clear corneal wound was created with a keratome blade temporally.  A continuous curvilinear capsulorrhexis was started with a bent cystitome and completed with Utrada forceps. Hydrodissection of the lens nucleus was performed with BSS on a cannula. The lens nucleus was rotated. Phacoemulsification of the lens nucleus was accomplished in a phacoemulsification stop and chop technique. Remaining cortex was removed with irrigation and aspiration. The lens capsule was noted to be intact. Provisc was used to inflate the capsular bag.  The lens was injected into the capsular bag. Remaining  viscoelastic was removed with irrigation and aspiration. The wounds were checked and found to be watertight after hydration. The eyelid speculum was removed and CatarActive3 drops were placed into the operative eye. The patient tolerated the procedure well and was in stable condition on the way to the recovery area.     Caprice Jiménez MD

## 2017-06-06 NOTE — ANESTHESIA POSTPROCEDURE EVALUATION
Patient: Marisol Orta    Procedure(s):  LEFT EYE PHACOEMULSIFICATION CLEAR CORNEA WITH STANDARD INTRAOCULAR LENS IMPLANT  - Wound Class: I-Clean    Diagnosis:cataract  Diagnosis Additional Information: No value filed.    Anesthesia Type:  MAC    Note:  Anesthesia Post Evaluation    Patient location during evaluation: PACU  Patient participation: Able to fully participate in evaluation  Level of consciousness: awake  Pain management: adequate  Airway patency: patent  Cardiovascular status: acceptable  Respiratory status: acceptable  Hydration status: acceptable  PONV: none             Last vitals:  Vitals:    06/06/17 1317 06/06/17 1440 06/06/17 1500   BP: 120/74 118/58 116/66   Resp: 16 16 16   Temp: 36.1  C (96.9  F)     SpO2: 100% 99% 99%         Electronically Signed By: Laurita Ragland  June 6, 2017  6:22 PM

## 2017-06-06 NOTE — ANESTHESIA PREPROCEDURE EVALUATION
Anesthesia Evaluation     . Pt has had prior anesthetic.     No history of anesthetic complications          ROS/MED HX    ENT/Pulmonary:      (-) sleep apnea   Neurologic:       Cardiovascular:     (+) Dyslipidemia, hypertension----. : . . . :. .       METS/Exercise Tolerance:     Hematologic:         Musculoskeletal: Comment: backpain  (+) arthritis, , , -       GI/Hepatic:         Renal/Genitourinary:         Endo:         Psychiatric:         Infectious Disease:         Malignancy:         Other:                     Physical Exam  Normal systems: dental    Airway   Mallampati: I  TM distance: >3 FB  Neck ROM: full    Dental     Cardiovascular   Rhythm and rate: regular      Pulmonary    breath sounds clear to auscultation                    Anesthesia Plan      History & Physical Review  History and physical reviewed and following examination; no interval change.    ASA Status:  2 .        Plan for MAC with Intravenous induction. Reason for MAC:  Procedure to face, neck, head or breast  PONV prophylaxis:  Ondansetron (or other 5HT-3)       Postoperative Care      Consents  Anesthetic plan, risks, benefits and alternatives discussed with:  Patient..                          .

## 2017-06-07 ENCOUNTER — OFFICE VISIT (OUTPATIENT)
Dept: OPHTHALMOLOGY | Facility: CLINIC | Age: 67
End: 2017-06-07
Payer: MEDICARE

## 2017-06-07 DIAGNOSIS — H25.811 COMBINED FORM OF AGE-RELATED CATARACT, RIGHT EYE: ICD-10-CM

## 2017-06-07 DIAGNOSIS — Z96.1 PSEUDOPHAKIA OF LEFT EYE: ICD-10-CM

## 2017-06-07 DIAGNOSIS — H40.003 GLAUCOMA SUSPECT, BILATERAL: Primary | ICD-10-CM

## 2017-06-07 PROBLEM — H25.813 COMBINED FORM OF AGE-RELATED CATARACT, BOTH EYES: Status: RESOLVED | Noted: 2017-05-03 | Resolved: 2017-06-07

## 2017-06-07 PROCEDURE — 99024 POSTOP FOLLOW-UP VISIT: CPT | Performed by: STUDENT IN AN ORGANIZED HEALTH CARE EDUCATION/TRAINING PROGRAM

## 2017-06-07 ASSESSMENT — VISUAL ACUITY
METHOD: SNELLEN - LINEAR
OS_SC: 20/50
OS_PH_SC: 20/30

## 2017-06-07 ASSESSMENT — TONOMETRY
OS_IOP_MMHG: 18
IOP_METHOD: TONOPEN

## 2017-06-07 ASSESSMENT — SLIT LAMP EXAM - LIDS: COMMENTS: NORMAL

## 2017-06-07 ASSESSMENT — EXTERNAL EXAM - LEFT EYE: OS_EXAM: NORMAL

## 2017-06-07 NOTE — PATIENT INSTRUCTIONS
POST-OP CATARACT INSTRUCTIONS    *   Use the following drop(s) in the LEFT EYEfour times a day:        CatarActive 3    *   If you are taking glaucoma drops, continue as usual.    *   Wear eye shield when sleeping for one week.    *   No eye rubbing or lifting more than 10 pounds for one week.    *   Keep water out of eye for two weeks.    *   OK to resume aspirin and/or other blood thinners.    *   Return as scheduled in about one week.    *   If your vision worsens, eye becomes increasingly red, or becomes painful, call [phone].     Caprice Jiménez M.D.

## 2017-06-07 NOTE — MR AVS SNAPSHOT
After Visit Summary   6/7/2017    Marisol Orta    MRN: [id number]           Patient Information     Date Of Birth          1950        Visit Information        Provider Department      6/7/2017 2:00 PM Caprice Jiménez MD [facility]        Today's Diagnoses     Glaucoma suspect, bilateral    -  1      Care Instructions    POST-OP CATARACT INSTRUCTIONS    *   Use the following drop(s) in the LEFT EYEfour times a day:        CatarActive 3    *   If you are taking glaucoma drops, continue as usual.    *   Wear eye shield when sleeping for one week.    *   No eye rubbing or lifting more than 10 pounds for one week.    *   Keep water out of eye for two weeks.    *   OK to resume aspirin and/or other blood thinners.    *   Return as scheduled in about one week.    *   If your vision worsens, eye becomes increasingly red, or becomes painful, call [phone].     Caprice Jiménez M.D.          Follow-ups after your visit        Follow-up notes from your care team     Return for as scheduled, PO2.      Your next 10 appointments already scheduled     Jun 14, 2017  9:45 AM CDT   Return Visit with Cap[facility] Jim[facility]nez MD   [facility]    [address] [facility] [address] [phone]            Jun 19, 2017  7:45 AM CDT   LAB with AN LAB   [facility]    [address] [address] [phone]           Patient must bring picture ID.  Patient should be prepared to give a urine specimen  Please do not eat 10-12 hours before your appointment if you are coming in fasting for labs on lipids, cholesterol, or glucose (sugar).  Pregnant women should follow their Care Team instructions. Water with medications is okay. Do not drink coffee or other fluids.   If you have concerns about taking  your medications, please ask at office or if scheduling via [employer], send a message by clicking on  Secure Messaging, Message Your Care Team.            Jun 20, 2017   Procedure with Caprice Jiménez MD   [facility] (--)    [address]  [place] [address] [phone]            Jun 21, 2017 12:45 PM CDT   Return Visit with Caprice Jiménez MD   [facility] ([facility])    [address] [facility] [address] [phone]            Jun 27, 2017  8:15 AM CDT   PHYSICAL with Kizzy Thomason MD   [facility] ([facility]    [address] [facility] [address] [phone]            Jun 27, 2017 12:45 PM CDT   Return Visit with Caprice Jiménez MD   [facility] ([facility])    [address] [facility] [address] [phone]            Jul 28, 2017  8:45 AM CDT   Return Visit with Caprice Jiménez MD   [facility] ([facility])    [address] [facility] [address] [phone]              Who to contact     If you have questions or need follow up information about today's clinic visit or your schedule please contact [facility] directly at [phone].  Normal or non-critical lab and imaging results will be communicated to you by angelcamhart, letter or phone within 4 business days after the clinic has received the results. If you do not hear from us within 7 days, please contact the clinic through angelcamhart or phone. If you have a critical or abnormal lab result, we will notify you by phone as soon as possible.  Submit refill requests through [employer] or call your pharmacy and they will forward the refill request to us. Please allow 3 business days for your refill to be completed.          Additional Information About Your Visit        angelcamhart Information     [employer] gives you secure access to your electronic health record. If you see a primary care provider, you can also send messages to your care team  and make appointments. If you have questions, please call your primary care clinic.  If you do not have a primary care provider, please call [phone] and they will assist you.        Care EveryWhere ID     This is your Care EveryWhere ID. This could be used by other organizations to access your [facility] medical records  [id number]         Blood Pressure from Last 3 Encounters:   06/06/17 116/66   06/01/17 138/82   02/08/17 121/66    Weight from Last 3 Encounters:   06/01/17 88 kg (194 lb)   02/08/17 86.6 kg (191 lb)   12/27/16 87.5 kg (193 lb)              Today, you had the following     No orders found for display       Primary Care Provider Office Phone # Fax #    Kizzy Thomason [phone]       [facility] [address] [facility] [address]        Thank you!     Thank you for choosing [facility]  for your care. Our goal is always to provide you with excellent care. Hearing back from our patients is one way we can continue to improve our services. Please take a few minutes to complete the written survey that you may receive in the mail after your visit with us. Thank you!             Your Updated Medication List - Protect others around you: Learn how to safely use, store and throw away your medicines at www.disposemymeds.org.          This list is accurate as of: 6/7/17  2:49 PM.  Always use your most recent med list.                   Brand Name Dispense Instructions for use    Bromfenac Sodium Powd     1 Bottle    1 Bottle 4 times daily 1 drop four times a day into the operative eye starting 1 day before surgery. Use until bottle runs out.       * CALCIUM 600+D 600-200 MG-UNIT Tabs   Generic drug:  calcium carbonate-vitamin D      Take 1 tablet by mouth daily       * CALCIUM 600+D PO      2 tablet daily       Dexamethasone Acetate Powd     1 Bottle    1 Bottle 4 times daily 1 drop four times a day into the operative eye starting 1 day before surgery.  Use until bottle runs out.       lisinopril 2.5 MG tablet    PRINIVIL/Zestril    90 tablet    Take 1 tablet (2.5 mg) by mouth daily       Moxifloxacin HCl Powd     1 Bottle    1 Bottle 4 times daily 1 drop four times a day into the operative eye starting 1 day before surgery. Use until bottle runs out.       * MULTIVITAMIN GUMMIES ADULT PO          * Multi-vitamin Tabs tablet      Take 1 tablet by mouth daily       * triamterene-hydrochlorothiazide 37.5-25 MG per capsule    DYAZIDE     Take 1 capsule by mouth daily       * triamterene-hydrochlorothiazide 37.5-25 MG per capsule    DYAZIDE    90 capsule    Take 1 capsule by mouth every morning       * Notice:  This list has 6 medication(s) that are the same as other medications prescribed for you. Read the directions carefully, and ask your doctor or other care provider to review them with you.

## 2017-06-07 NOTE — PROGRESS NOTES
Current Eye Medications:  Cataractive 3 qid left eye      Subjective:  PO1 Kelman Phacoemulsification Intraocular lens left eye.  No pain or discomfort.  Doing well.     Objective:  See Ophthalmology Exam.      Assessment:  Marisol Orta is a 67 year old female who presents with:     Glaucoma suspect, bilateral      Pseudophakia of left eye      Combined form of age-related cataract, right eye po1 left eye, target mild myopia       POST-OP CATARACT INSTRUCTIONS    *   Use the following drop(s) in the LEFT EYE four times a day:        CatarActive 3  *   If you are taking glaucoma drops, continue as usual.  *   Wear eye shield when sleeping for one week.  *   No eye rubbing or lifting more than 10 pounds for one week.  *   Keep water out of eye for two weeks.  *   OK to resume aspirin and/or other blood thinners.  *   Return as scheduled in about one week.  *   If your vision worsens, eye becomes increasingly red, or becomes painful, call [phone].     Caprice Jiménez M.D.

## 2017-06-14 ENCOUNTER — DOCUMENTATION ONLY (OUTPATIENT)
Dept: LAB | Facility: CLINIC | Age: 67
End: 2017-06-14

## 2017-06-14 ENCOUNTER — OFFICE VISIT (OUTPATIENT)
Dept: OPHTHALMOLOGY | Facility: CLINIC | Age: 67
End: 2017-06-14
Payer: MEDICARE

## 2017-06-14 DIAGNOSIS — I10 HYPERTENSION GOAL BP (BLOOD PRESSURE) < 140/90: Primary | ICD-10-CM

## 2017-06-14 DIAGNOSIS — H26.8 PSEUDOEXFOLIATION OF LENS CAPSULE: ICD-10-CM

## 2017-06-14 DIAGNOSIS — H40.003 GLAUCOMA SUSPECT, BILATERAL: ICD-10-CM

## 2017-06-14 DIAGNOSIS — Z96.1 PSEUDOPHAKIA OF LEFT EYE: ICD-10-CM

## 2017-06-14 DIAGNOSIS — H25.811 COMBINED FORM OF AGE-RELATED CATARACT, RIGHT EYE: Primary | ICD-10-CM

## 2017-06-14 PROCEDURE — 99024 POSTOP FOLLOW-UP VISIT: CPT | Performed by: STUDENT IN AN ORGANIZED HEALTH CARE EDUCATION/TRAINING PROGRAM

## 2017-06-14 PROCEDURE — 92136 OPHTHALMIC BIOMETRY: CPT | Mod: 26 | Performed by: STUDENT IN AN ORGANIZED HEALTH CARE EDUCATION/TRAINING PROGRAM

## 2017-06-14 ASSESSMENT — REFRACTION_MANIFEST
OS_CYLINDER: +1.00
OS_SPHERE: -0.25
OS_AXIS: 119

## 2017-06-14 ASSESSMENT — EXTERNAL EXAM - LEFT EYE: OS_EXAM: NORMAL

## 2017-06-14 ASSESSMENT — VISUAL ACUITY
OD_CC: 20/25
OS_SC+: -1
OD_CC+: +2
OS_SC: 20/20
METHOD: SNELLEN - LINEAR

## 2017-06-14 ASSESSMENT — TONOMETRY
IOP_METHOD: APPLANATION
OS_IOP_MMHG: 16

## 2017-06-14 ASSESSMENT — SLIT LAMP EXAM - LIDS
COMMENTS: NORMAL
COMMENTS: NORMAL

## 2017-06-14 ASSESSMENT — EXTERNAL EXAM - RIGHT EYE: OD_EXAM: NORMAL

## 2017-06-14 NOTE — PROGRESS NOTES
Current Eye Medications:  Cataractive3 QID left eye     Subjective: Here for Preop right eye/PO2 left eye.  Has CA3 at home for the right eye.  Hasn't popped out the glasses lens yet, will today.      Objective:  See Ophthalmology Exam.      Assessment:  Marisol Orta is a 67 year old female who presents with:     Combined form of age-related cataract, right eye preop right eye, target mostly distance right eye     Glaucoma suspect, bilateral      Pseudophakia of left eye po2 left eye, doing well     Pseudoexfoliation of lens capsule, bilateral        POST-OP CATARACT INSTRUCTIONS for the LEFT EYE:  *   Use the following eye drop(s):   CatarActive 3 four times a day until bottle is empty  *   Stop wearing eye shield.  *   No eye rubbing. May resume heavy lifting.  *   If you are taking glaucoma drops, continue as usual.  *   If your vision worsens, eye becomes increasingly red, or becomes painful, call [phone].    PRE-OP CATARACT INSTRUCTIONS for the RIGHT EYE:  *   2 eye drops from the pharmacy at least 3 days before surgery. Can use Vigamox or Ofloxacin (tan top) from first eye if enough remains.  *  Use the following drops in the right eye 4 times on the day before surgery and once the morning of surgery:             CatarActive3  *  Please bring all your eyedrops to the surgery center.  *  If taking more than one drop, wait five minutes between drops.  *  No solid food after midnight.  *  Clear liquids: coffee (no cream), tea, water, and jello are OK before 6:15 A.M.  You may take your regular pills with this.    Caprice Jiménez MD  [phone]

## 2017-06-14 NOTE — PATIENT INSTRUCTIONS
POST-OP CATARACT INSTRUCTIONS for the LEFT EYE:    *   Use the following eye drop(s):   CatarActive 3 four times a day until bottle is empty    *   Stop wearing eye shield.    *   No eye rubbing. May resume heavy lifting.    *   If you are taking glaucoma drops, continue as usual.    *   If your vision worsens, eye becomes increasingly red, or becomes painful, call [phone].      PRE-OP CATARACT INSTRUCTIONS for the RIGHT EYE:    *   2 eye drops from the pharmacy at least 3 days before surgery. Can use Vigamox or Ofloxacin (tan top) from first eye if enough remains.    *  Use the following drops in the right eye 4 times on the day before surgery and once the morning of surgery:                                   CatarActive3    *  Please bring all your eyedrops to the surgery center.    *  If taking more than one drop, wait five minutes between drops.    *  No solid food after midnight.    *  Clear liquids: coffee (no cream), tea, water, and jello are OK before 6:15 A.M.  You may take your regular pills with this.    Caprice Jiménez MD  [phone]

## 2017-06-14 NOTE — PROGRESS NOTES
Please review, associate diagnosis and sign pending laboratory future orders for patient  upcoming lab appointment on 06/19/17.    Thank you,   Romelia Boyle MLT

## 2017-06-14 NOTE — PROGRESS NOTES
Need previsit labs-see orders and close encounter if nothing else needed./Christina Macias,       Physical 6/27/17

## 2017-06-14 NOTE — MR AVS SNAPSHOT
After Visit Summary   6/14/2017    Marisol Orta    MRN: [id number]           Patient Information     Date Of Birth          1950        Visit Information        Provider Department      6/14/2017 9:45 AM Caprice Jiménez MD [facility]        Today's Diagnoses     Combined form of age-related cataract, right eye    -  1    Glaucoma suspect, bilateral        Pseudophakia of left eye        Pseudoexfoliation of lens capsule, bilateral          Care Instructions    POST-OP CATARACT INSTRUCTIONS for the LEFT EYE:    *   Use the following eye drop(s):   CatarActive 3 four times a day until bottle is empty    *   Stop wearing eye shield.    *   No eye rubbing. May resume heavy lifting.    *   If you are taking glaucoma drops, continue as usual.    *   If your vision worsens, eye becomes increasingly red, or becomes painful, call [phone].      PRE-OP CATARACT INSTRUCTIONS for the RIGHT EYE:    *   2 eye drops from the pharmacy at least 3 days before surgery. Can use Vigamox or Ofloxacin (tan top) from first eye if enough remains.    *  Use the following drops in the right eye 4 times on the day before surgery and once the morning of surgery:                                   CatarActive3    *  Please bring all your eyedrops to the surgery center.    *  If taking more than one drop, wait five minutes between drops.    *  No solid food after midnight.    *  Clear liquids: coffee (no cream), tea, water, and jello are OK before 6:15 A.M.  You may take your regular pills with this.    Caprice Jiménez MD  [phone]          Follow-ups after your visit        Follow-up notes from your care team     Return for as scheduled, PO1.      Your next 10 appointments already scheduled     Jun 19, 2017  7:45 AM CDT   LAB with AN LAB   [facility]    [address] [facility] [address] [phone]           Patient must bring picture  ID.  Patient should be prepared to give a urine specimen  Please do not eat 10-12 hours before your appointment if you are coming in fasting for labs on lipids, cholesterol, or glucose (sugar).  Pregnant women should follow their Care Team instructions. Water with medications is okay. Do not drink coffee or other fluids.   If you have concerns about taking  your medications, please ask at office or if scheduling via [employer], send a message by clicking on Secure Messaging, Message Your Care Team.            Jun 20, 2017   Procedure with Caprice Jiménez MD   [facility] (--)    [address]  [facility] [address]   [phone]            Jun 21, 2017 12:45 PM CDT   [facility]   [facility]    [address] [facility] [facility] [address] [phone]            Jun 27, 2017  8:15 AM CDT   PHYSICAL with Kizzy Thomason MD   [facility]    [address] [facility] [address] [phone]            Jun 27, 2017 12:45 PM CDT   [facility] Visit with Caprice Jiménez MD   [facility]    [address] [address] [phone]            Jul 28, 2017  8:45 AM CDT   Return Visit with Caprice Jiménez MD   [facility]    [address] [place] [address] [phone]              Who to contact     If you have questions or need follow up information about today's clinic visit or your schedule please contact [facility] directly at [phone].  Normal or non-critical lab and imaging results will be communicated to you by MyChart, letter or phone within 4 business days after the clinic has received the results. If you do not hear from us within 7 days, please contact the clinic through MyChart or phone. If you have a critical or abnormal  lab result, we will notify you by phone as soon as possible.  Submit refill requests through [employer] or call your pharmacy and they will forward the refill request to us. Please allow 3 business days for your refill to be completed.          Additional Information About Your Visit        [employer] Information     [employer] gives you secure access to your electronic health record. If you see a primary care provider, you can also send messages to your care team and make appointments. If you have questions, please call your primary care clinic.  If you do not have a primary care provider, please call [phone] and they will assist you.        Care EveryWhere ID     This is your Care EveryWhere ID. This could be used by other organizations to access your [facility] medical records  [id number]         Blood Pressure from Last 3 Encounters:   06/06/17 116/66   06/01/17 138/82   02/08/17 121/66    Weight from Last 3 Encounters:   06/01/17 88 kg (194 lb)   02/08/17 86.6 kg (191 lb)   12/27/16 87.5 kg (193 lb)              Today, you had the following     No orders found for display       Primary Care Provider Office Phone # Fax #    Kizzy Thomason [phone]       [facility] [address] [facility] [address]        Thank you!     Thank you for choosing [facility]  for your care. Our goal is always to provide you with excellent care. Hearing back from our patients is one way we can continue to improve our services. Please take a few minutes to complete the written survey that you may receive in the mail after your visit with us. Thank you!             Your Updated Medication List - Protect others around you: Learn how to safely use, store and throw away your medicines at www.disposemymeds.org.          This list is accurate as of: 6/14/17 10:27 AM.  Always use your most recent med list.                   Brand Name Dispense Instructions for use    Bromfenac Sodium Powd      1 Bottle    1 Bottle 4 times daily 1 drop four times a day into the operative eye starting 1 day before surgery. Use until bottle runs out.       * CALCIUM 600+D 600-200 MG-UNIT Tabs   Generic drug:  calcium carbonate-vitamin D      Take 1 tablet by mouth daily       * CALCIUM 600+D PO      2 tablet daily       Dexamethasone Acetate Powd     1 Bottle    1 Bottle 4 times daily 1 drop four times a day into the operative eye starting 1 day before surgery. Use until bottle runs out.       lisinopril 2.5 MG tablet    PRINIVIL/Zestril    90 tablet    Take 1 tablet (2.5 mg) by mouth daily       Moxifloxacin HCl Powd     1 Bottle    1 Bottle 4 times daily 1 drop four times a day into the operative eye starting 1 day before surgery. Use until bottle runs out.       * MULTIVITAMIN GUMMIES ADULT PO          * Multi-vitamin Tabs tablet      Take 1 tablet by mouth daily       * triamterene-hydrochlorothiazide 37.5-25 MG per capsule    DYAZIDE     Take 1 capsule by mouth daily       * triamterene-hydrochlorothiazide 37.5-25 MG per capsule    DYAZIDE    90 capsule    Take 1 capsule by mouth every morning       * Notice:  This list has 6 medication(s) that are the same as other medications prescribed for you. Read the directions carefully, and ask your doctor or other care provider to review them with you.

## 2017-06-19 DIAGNOSIS — I10 HYPERTENSION GOAL BP (BLOOD PRESSURE) < 140/90: ICD-10-CM

## 2017-06-19 LAB
ANION GAP SERPL CALCULATED.3IONS-SCNC: 8 MMOL/L (ref 3–14)
BUN SERPL-MCNC: 17 MG/DL (ref 7–30)
CALCIUM SERPL-MCNC: 9.1 MG/DL (ref 8.5–10.1)
CHLORIDE SERPL-SCNC: 105 MMOL/L (ref 94–109)
CO2 SERPL-SCNC: 30 MMOL/L (ref 20–32)
CREAT SERPL-MCNC: 0.83 MG/DL (ref 0.52–1.04)
GFR SERPL CREATININE-BSD FRML MDRD: 69 ML/MIN/1.7M2
GLUCOSE SERPL-MCNC: 97 MG/DL (ref 70–99)
POTASSIUM SERPL-SCNC: 3.9 MMOL/L (ref 3.4–5.3)
SODIUM SERPL-SCNC: 143 MMOL/L (ref 133–144)

## 2017-06-19 PROCEDURE — 36415 COLL VENOUS BLD VENIPUNCTURE: CPT | Performed by: FAMILY MEDICINE

## 2017-06-19 PROCEDURE — 80048 BASIC METABOLIC PNL TOTAL CA: CPT | Performed by: FAMILY MEDICINE

## 2017-06-19 NOTE — H&P (VIEW-ONLY)
[facility]  [address] [facility] [address] [phone]  Dept: [phone]    PRE-OP EVALUATION:  Today's date: 2017    Marisol Orta (: 1950) presents for pre-operative evaluation assessment as requested by Dr. Jiménez.  She requires evaluation and anesthesia risk assessment prior to undergoing surgery/procedure for treatment of Cataract .  Proposed procedure: bilateral cataracts     Date of Surgery/ Procedure: 17 and 17  Time of Surgery/ Procedure: 2:30 and 12:45    Hospital/Surgical Facility: [facility]  Fax number for surgical facility:   Primary Physician: Kizzy Thomason  Type of Anesthesia Anticipated: Local with MAC    Patient has a Health Care Directive or Living Will:  NO    1. NO - Do you have a history of heart attack, stroke, stent, bypass or surgery on an artery in the head, neck, heart or legs?  2. NO - Do you ever have any pain or discomfort in your chest?  3. NO - Do you have a history of  Heart Failure?  4. NO - Are you troubled by shortness of breath when: walking on the level, up a slight hill or at night?  5. NO - Do you currently have a cold, bronchitis or other respiratory infection?  6. NO - Do you have a cough, shortness of breath or wheezing?  7. NO - Do you sometimes get pains in the calves of your legs when you walk?  8. NO - Do you or anyone in your family have previous history of blood clots?  9. NO - Do you or does anyone in your family have a serious bleeding problem such as prolonged bleeding following surgeries or cuts?  10. YES - HAVE YOU EVER HAD PROBLEMS WITH ANEMIA OR BEEN TOLD TO TAKE IRON PILLS? During pregnancy   11. NO - Have you had any abnormal blood loss such as black, tarry or bloody stools, or abnormal vaginal bleeding?  12. NO - Have you ever had a blood transfusion?  13. NO - Have you or any of your relatives ever had problems with anesthesia?  14. NO - Do you have sleep apnea, excessive snoring or daytime  drowsiness?  15. NO - Do you have any prosthetic heart valves?  16. NO - Do you have prosthetic joints?  17. NO - Is there any chance that you may be pregnant?      HPI:                                                      Brief HPI related to upcoming procedure: pt with bilateral cataracts causing visual impairment.  Having bilateral cataract removal      HYPERTENSION - Patient has longstanding history of mod-severe HTN , currently denies any symptoms referable to elevated blood pressure. Specifically denies chest pain, palpitations, dyspnea, orthopnea, PND or peripheral edema. Blood pressure readings have been in normal range. Current medication regimen is as listed below. Patient denies any side effects of medication.                                                                                                                                                                                          .    MEDICAL HISTORY:                                                      Patient Active Problem List    Diagnosis Date Noted     Combined form of age-related cataract, both eyes 05/03/2017     Priority: Medium     CARDIOVASCULAR SCREENING; LDL GOAL LESS THAN 160 10/13/2015     Priority: Medium     Pseudoexfoliation of lens capsule, bilateral 10/11/2012     Priority: Medium     Advanced directives, counseling/discussion 08/16/2011     Priority: Medium     Discussed Advance Directive planning with patient; information given to patient to review.  Azra Lazar CMA           Vitreous membranes and strands 08/03/2011     Priority: Medium     Hypertension goal BP (blood pressure) < 140/90 05/27/2011     Priority: Medium     Back pain 06/02/2010     Priority: Medium     Osteoarthritis 06/02/2010     Priority: Medium     Sees Dr Quevedo        Past Medical History:   Diagnosis Date     Arthritis     osteoarthritis     Back pain      HTN (hypertension)      Hyperlipidemia LDL goal < 130      Past Surgical History:  "  Procedure Laterality Date     APPENDECTOMY       COLONOSCOPY  2012?     FOOT SURGERY      left foot 1st  fusion     ORTHOPEDIC SURGERY       Current Outpatient Prescriptions   Medication Sig Dispense Refill     triamterene-hydrochlorothiazide (DYAZIDE) 37.5-25 MG per capsule Take 1 capsule by mouth every morning 90 capsule 1     lisinopril (PRINIVIL/ZESTRIL) 2.5 MG tablet Take 1 tablet (2.5 mg) by mouth daily 90 tablet 1     Multiple Vitamins-Minerals (MULTIVITAMIN GUMMIES ADULT PO)        CALCIUM 600+D PO 2 tablet daily       [START ON 6/5/2017] Bromfenac Sodium POWD 1 Bottle 4 times daily 1 drop four times a day into the operative eye starting 1 day before surgery. Use until bottle runs out. (Patient not taking: Reported on 6/1/2017) 1 Bottle 1     [START ON 6/5/2017] Dexamethasone Acetate POWD 1 Bottle 4 times daily 1 drop four times a day into the operative eye starting 1 day before surgery. Use until bottle runs out. (Patient not taking: Reported on 6/1/2017) 1 Bottle 1     [START ON 6/5/2017] Moxifloxacin HCl POWD 1 Bottle 4 times daily 1 drop four times a day into the operative eye starting 1 day before surgery. Use until bottle runs out. (Patient not taking: Reported on 6/1/2017) 1 Bottle 1     OTC products: None, except as noted above    Allergies   Allergen Reactions     Nkda [No Known Drug Allergies]       Latex Allergy: NO    Social History   Substance Use Topics     Smoking status: Never Smoker     Smokeless tobacco: Never Used      Comment: smoke free household     Alcohol use No     History   Drug Use No       REVIEW OF SYSTEMS:                                                    C: NEGATIVE for fever, chills, change in weight  E/M: NEGATIVE for ear, mouth and throat problems  R: NEGATIVE for significant cough or SOB  CV: NEGATIVE for chest pain, palpitations or peripheral edema    EXAM:                                                    /82  Pulse 86  Temp 96.8  F (36  C) (Oral)  Ht 5' 3\" " (1.6 m)  Wt 194 lb (88 kg)  BMI 34.37 kg/m2  GENERAL APPEARANCE: healthy, alert and no distress  HENT: ear canals and TM's normal and nose and mouth without ulcers or lesions  RESP: lungs clear to auscultation - no rales, rhonchi or wheezes  CV: regular rate and rhythm, normal S1 S2, no S3 or S4 and no murmur, click or rub   ABDOMEN: soft, nontender, no HSM or masses and bowel sounds normal  NEURO: Normal strength and tone, mentation intact, speech normal and cranial nerves 2-12 intact    DIAGNOSTICS:                                                    No labs or EKG required for low risk surgery (cataract, skin procedure, breast biopsy, etc)    Recent Labs   Lab Test  12/20/16   0930  06/30/16   1136   06/02/10   1201   HGB   --   14.0   --   13.7   PLT   --   251   --   250   NA  141  141   < >  142   POTASSIUM  3.2*  3.7   < >  3.6   CR  0.64  0.69   < >  0.77    < > = values in this interval not displayed.        IMPRESSION:                                                    Reason for surgery/procedure: pt with bilateral cataracts causing visual impairment.  Having bilateral cataract removal      The proposed surgical procedure is considered LOW risk.    REVISED CARDIAC RISK INDEX  The patient has the following serious cardiovascular risks for perioperative complications such as (MI, PE, VFib and 3  AV Block):  No serious cardiac risks  INTERPRETATION: 0 risks: Class I (very low risk - 0.4% complication rate)    The patient has the following additional risks for perioperative complications:  No identified additional risks      ICD-10-CM    1. Preop general physical exam Z01.818    2. Pseudoexfoliation of lens capsule, bilateral H25.89        RECOMMENDATIONS:                                                          --Patient is to take all scheduled medications on the day of surgery.    APPROVAL GIVEN to proceed with proposed procedure, without further diagnostic evaluation       Signed Electronically by: Kizzy  SAUL Thomason MD    Copy of this evaluation report is provided to requesting physician.    [place] Preop Guidelines

## 2017-06-20 ENCOUNTER — ANESTHESIA EVENT (OUTPATIENT)
Dept: SURGERY | Facility: CLINIC | Age: 67
End: 2017-06-20
Payer: MEDICARE

## 2017-06-20 ENCOUNTER — ANESTHESIA (OUTPATIENT)
Dept: SURGERY | Facility: CLINIC | Age: 67
End: 2017-06-20
Payer: MEDICARE

## 2017-06-20 ENCOUNTER — HOSPITAL ENCOUNTER (OUTPATIENT)
Facility: CLINIC | Age: 67
Discharge: HOME | End: 2017-06-20
Attending: STUDENT IN AN ORGANIZED HEALTH CARE EDUCATION/TRAINING PROGRAM | Admitting: STUDENT IN AN ORGANIZED HEALTH CARE EDUCATION/TRAINING PROGRAM
Payer: MEDICARE

## 2017-06-20 VITALS
RESPIRATION RATE: 16 BRPM | OXYGEN SATURATION: 99 % | DIASTOLIC BLOOD PRESSURE: 64 MMHG | TEMPERATURE: 97.3 F | SYSTOLIC BLOOD PRESSURE: 117 MMHG

## 2017-06-20 PROCEDURE — 40000170 ZZH STATISTIC PRE-PROCEDURE ASSESSMENT II: Performed by: STUDENT IN AN ORGANIZED HEALTH CARE EDUCATION/TRAINING PROGRAM

## 2017-06-20 PROCEDURE — 25000128 H RX IP 250 OP 636: Performed by: STUDENT IN AN ORGANIZED HEALTH CARE EDUCATION/TRAINING PROGRAM

## 2017-06-20 PROCEDURE — 27210794 ZZH OR GENERAL SUPPLY STERILE: Performed by: STUDENT IN AN ORGANIZED HEALTH CARE EDUCATION/TRAINING PROGRAM

## 2017-06-20 PROCEDURE — 25000125 ZZHC RX 250: Performed by: ANESTHESIOLOGY

## 2017-06-20 PROCEDURE — 25000128 H RX IP 250 OP 636: Performed by: NURSE ANESTHETIST, CERTIFIED REGISTERED

## 2017-06-20 PROCEDURE — 71000028 ZZH EYE RECOVERY PHASE 2 EACH 15 MINS: Performed by: STUDENT IN AN ORGANIZED HEALTH CARE EDUCATION/TRAINING PROGRAM

## 2017-06-20 PROCEDURE — 37000008 ZZH ANESTHESIA TECHNICAL FEE, 1ST 30 MIN: Performed by: STUDENT IN AN ORGANIZED HEALTH CARE EDUCATION/TRAINING PROGRAM

## 2017-06-20 PROCEDURE — 25000125 ZZHC RX 250: Performed by: STUDENT IN AN ORGANIZED HEALTH CARE EDUCATION/TRAINING PROGRAM

## 2017-06-20 PROCEDURE — 66984 XCAPSL CTRC RMVL W/O ECP: CPT | Mod: 79 | Performed by: STUDENT IN AN ORGANIZED HEALTH CARE EDUCATION/TRAINING PROGRAM

## 2017-06-20 PROCEDURE — 25000132 ZZH RX MED GY IP 250 OP 250 PS 637: Mod: GY | Performed by: STUDENT IN AN ORGANIZED HEALTH CARE EDUCATION/TRAINING PROGRAM

## 2017-06-20 PROCEDURE — 36000101 ZZH EYE SURGERY LEVEL 3 1ST 30 MIN: Performed by: STUDENT IN AN ORGANIZED HEALTH CARE EDUCATION/TRAINING PROGRAM

## 2017-06-20 PROCEDURE — 25000128 H RX IP 250 OP 636: Performed by: ANESTHESIOLOGY

## 2017-06-20 PROCEDURE — V2632 POST CHMBR INTRAOCULAR LENS: HCPCS | Performed by: STUDENT IN AN ORGANIZED HEALTH CARE EDUCATION/TRAINING PROGRAM

## 2017-06-20 DEVICE — EYE IMP IOL AMO PCL TECNIS ZCB00 17.5: Type: IMPLANTABLE DEVICE | Site: EYE | Status: FUNCTIONAL

## 2017-06-20 RX ORDER — CYCLOPENTOLATE HYDROCHLORIDE 10 MG/ML
1 SOLUTION/ DROPS OPHTHALMIC
Status: COMPLETED | OUTPATIENT
Start: 2017-06-20 | End: 2017-06-20

## 2017-06-20 RX ORDER — LIDOCAINE HYDROCHLORIDE 10 MG/ML
INJECTION, SOLUTION EPIDURAL; INFILTRATION; INTRACAUDAL; PERINEURAL PRN
Status: DISCONTINUED | OUTPATIENT
Start: 2017-06-20 | End: 2017-06-20 | Stop reason: HOSPADM

## 2017-06-20 RX ORDER — TROPICAMIDE 10 MG/ML
1 SOLUTION/ DROPS OPHTHALMIC
Status: COMPLETED | OUTPATIENT
Start: 2017-06-20 | End: 2017-06-20

## 2017-06-20 RX ORDER — BALANCED SALT SOLUTION 6.4; .75; .48; .3; 3.9; 1.7 MG/ML; MG/ML; MG/ML; MG/ML; MG/ML; MG/ML
SOLUTION OPHTHALMIC PRN
Status: DISCONTINUED | OUTPATIENT
Start: 2017-06-20 | End: 2017-06-20 | Stop reason: HOSPADM

## 2017-06-20 RX ORDER — FLURBIPROFEN SODIUM 0.3 MG/ML
1 SOLUTION/ DROPS OPHTHALMIC
Status: DISCONTINUED | OUTPATIENT
Start: 2017-06-20 | End: 2017-06-20 | Stop reason: HOSPADM

## 2017-06-20 RX ORDER — PROPARACAINE HYDROCHLORIDE 5 MG/ML
SOLUTION/ DROPS OPHTHALMIC PRN
Status: DISCONTINUED | OUTPATIENT
Start: 2017-06-20 | End: 2017-06-20 | Stop reason: HOSPADM

## 2017-06-20 RX ORDER — ONDANSETRON 2 MG/ML
INJECTION INTRAMUSCULAR; INTRAVENOUS PRN
Status: DISCONTINUED | OUTPATIENT
Start: 2017-06-20 | End: 2017-06-20

## 2017-06-20 RX ORDER — PROPARACAINE HYDROCHLORIDE 5 MG/ML
1 SOLUTION/ DROPS OPHTHALMIC ONCE
Status: COMPLETED | OUTPATIENT
Start: 2017-06-20 | End: 2017-06-20

## 2017-06-20 RX ORDER — PHENYLEPHRINE HYDROCHLORIDE 25 MG/ML
1 SOLUTION/ DROPS OPHTHALMIC
Status: COMPLETED | OUTPATIENT
Start: 2017-06-20 | End: 2017-06-20

## 2017-06-20 RX ORDER — SODIUM CHLORIDE, SODIUM LACTATE, POTASSIUM CHLORIDE, CALCIUM CHLORIDE 600; 310; 30; 20 MG/100ML; MG/100ML; MG/100ML; MG/100ML
500 INJECTION, SOLUTION INTRAVENOUS CONTINUOUS
Status: DISCONTINUED | OUTPATIENT
Start: 2017-06-20 | End: 2017-06-20 | Stop reason: HOSPADM

## 2017-06-20 RX ADMIN — TROPICAMIDE 1 DROP: 10 SOLUTION/ DROPS OPHTHALMIC at 11:03

## 2017-06-20 RX ADMIN — PHENYLEPHRINE HYDROCHLORIDE 1 DROP: 2.5 SOLUTION/ DROPS OPHTHALMIC at 11:13

## 2017-06-20 RX ADMIN — MIDAZOLAM HYDROCHLORIDE 2 MG: 1 INJECTION, SOLUTION INTRAMUSCULAR; INTRAVENOUS at 11:57

## 2017-06-20 RX ADMIN — TROPICAMIDE 1 DROP: 10 SOLUTION/ DROPS OPHTHALMIC at 11:14

## 2017-06-20 RX ADMIN — ONDANSETRON 4 MG: 2 INJECTION INTRAMUSCULAR; INTRAVENOUS at 11:57

## 2017-06-20 RX ADMIN — LIDOCAINE HYDROCHLORIDE 1 ML: 10 INJECTION, SOLUTION EPIDURAL; INFILTRATION; INTRACAUDAL; PERINEURAL at 11:14

## 2017-06-20 RX ADMIN — FLURBIPROFEN SODIUM 1 DROP: 0.3 SOLUTION/ DROPS OPHTHALMIC at 10:56

## 2017-06-20 RX ADMIN — FLURBIPROFEN SODIUM 1 DROP: 0.3 SOLUTION/ DROPS OPHTHALMIC at 11:03

## 2017-06-20 RX ADMIN — LIDOCAINE HYDROCHLORIDE 1 ML: 10 INJECTION, SOLUTION EPIDURAL; INFILTRATION; INTRACAUDAL; PERINEURAL at 11:13

## 2017-06-20 RX ADMIN — FLURBIPROFEN SODIUM 1 DROP: 0.3 SOLUTION/ DROPS OPHTHALMIC at 11:13

## 2017-06-20 RX ADMIN — CYCLOPENTOLATE HYDROCHLORIDE 1 DROP: 10 SOLUTION/ DROPS OPHTHALMIC at 11:13

## 2017-06-20 RX ADMIN — CYCLOPENTOLATE HYDROCHLORIDE 1 DROP: 10 SOLUTION/ DROPS OPHTHALMIC at 11:03

## 2017-06-20 RX ADMIN — CYCLOPENTOLATE HYDROCHLORIDE 1 DROP: 10 SOLUTION/ DROPS OPHTHALMIC at 10:56

## 2017-06-20 RX ADMIN — PHENYLEPHRINE HYDROCHLORIDE 1 DROP: 2.5 SOLUTION/ DROPS OPHTHALMIC at 11:03

## 2017-06-20 RX ADMIN — PHENYLEPHRINE HYDROCHLORIDE 1 DROP: 2.5 SOLUTION/ DROPS OPHTHALMIC at 10:56

## 2017-06-20 RX ADMIN — PROPARACAINE HYDROCHLORIDE 1 DROP: 5 SOLUTION/ DROPS OPHTHALMIC at 10:56

## 2017-06-20 RX ADMIN — TROPICAMIDE 1 DROP: 10 SOLUTION/ DROPS OPHTHALMIC at 10:56

## 2017-06-20 RX ADMIN — SODIUM CHLORIDE, POTASSIUM CHLORIDE, SODIUM LACTATE AND CALCIUM CHLORIDE 500 ML: 600; 310; 30; 20 INJECTION, SOLUTION INTRAVENOUS at 11:13

## 2017-06-20 NOTE — DISCHARGE INSTRUCTIONS
CATARACT SURGERY POST-OP INSTRUCTIONS  Dr. Caprice Jiménez  [phone]      *Continue using CatarActive3 four times a day in the operative eye.  *You should get 3 doses in today and 4 doses daily starting tomorrow.      Keep the eye shield taped in place unless putting drops in. We will remove it for you in the office tomorrow.      Light sensitivity may be noticed. Sunglasses may be worn for comfort.      Do not rub the operated eye.      Keep the operated eye dry. You may wash your hair, bathe or shower, but keep the operated eye closed while doing so.       No swimming, hot tub, or sauna for 2 weeks.      No make up around eye for 5 days.      No bending at the waist or lifting more than 10 pounds for one week.      May take Tylenol (per directions on bottle) for mild pain.      Call Dr. Jiménez's cell at [phone]  if any of the following should occur:    o Any sudden vision changes  o Nausea or severe headache  o Increase in pain not controlled  o Or signs of infection (pus, increasing redness or tenderness)    [facility] Anesthesia [facility] Discharge  Instructions  Anesthesia ([facility] Care Center)   Adult Discharge Instructions    For 24 hours after surgery    1. Get plenty of rest.  Make arrangements to have a responsible adult stay with you for at least 6 hours after you leave the hospital.  2. Do not drive or use heavy equipment for 24 hours.    3. Do not drink alcohol for 24 hours.  4. Do not sign legal documents or make important decisions for 24 hours.  5. Avoid strenuous or risky activities. You may feel lightheaded.  If so, sit for a few minutes before standing.  Have someone help you get up.   6. Conscious sedation patients may resume a regular diet..  7. Any questions of medical nature, call your physician.

## 2017-06-20 NOTE — ANESTHESIA PREPROCEDURE EVALUATION
Anesthesia Evaluation     . Pt has had prior anesthetic.     No history of anesthetic complications          ROS/MED HX    ENT/Pulmonary:      (-) sleep apnea   Neurologic:       Cardiovascular:     (+) Dyslipidemia, hypertension----. : . . . :. .       METS/Exercise Tolerance:     Hematologic:         Musculoskeletal: Comment: backpain  (+) arthritis, , , -       GI/Hepatic:         Renal/Genitourinary:         Endo:         Psychiatric:         Infectious Disease:         Malignancy:         Other:                     Physical Exam  Normal systems: dental    Airway   Mallampati: III  TM distance: >3 FB  Neck ROM: full    Dental     Cardiovascular   Rhythm and rate: regular and normal      Pulmonary    breath sounds clear to auscultation                    Anesthesia Plan      History & Physical Review  History and physical reviewed and following examination; no interval change.    ASA Status:  2 .    NPO Status:  > 8 hours    Plan for MAC with Intravenous induction. Reason for MAC:  Procedure to face, neck, head or breast         Postoperative Care      Consents  Anesthetic plan, risks, benefits and alternatives discussed with:  Patient..                          .

## 2017-06-20 NOTE — OR NURSING
CatarActive3 0.5%/0.035%/0.1% ophthalmic solution 2 gtts given in the right eye at the end of the case. Pt supplied medication, no charge.

## 2017-06-20 NOTE — OP NOTE
PreOp Diagnosis: Visually significant nuclear sclerotic cataract right eye  PostOp Diagnosis: Same  Surgeon: Caprice Jiménez MD  Implant: Technis [id number] 17.5D    Procedures:   1. Review of intraocular lens calculations, both eyes   2. Phacoemulsification and extraction of lens  right eye   3. Intraocular lens implantation right eye  Anesthesia: MAC/topical  Complications: None  EBL: <1cc    Marisol Orta suffers from a visually significant cataract of the right eye. This has caused problems with distance and reading vision, including glare. After discussing the risks, benefits, and alternatives, the patient wishes to proceed with cataract surgery.    The patient was identified in the pre-op area where the right eye was marked. The patient was then brought to the operating room where a time out was called, identifying the patient, the procedure, and the correct site. Tetracaine drops were applied to both eyes. The operative eye was then prepped and draped in the usual sterile ophthalmic fashion. An eyelid speculum was placed into the operative eye. Additional tetracaine drops were applied. A paracentesis was made superior with a supersharp blade. 1% preservative-free lidocaine and 1:3 epinephrine:BSS was injected into the anterior chamber.  Viscoat was injected to deepen the anterior chamber. A 2.4mm clear corneal wound was created with a keratome blade temporally. A continuous curvilinear capsulorrhexis was started with a bent cystitome and completed with Utrada forceps. Hydrodissection of the lens nucleus was performed with BSS on a cannula. The lens nucleus was rotated. Phacoemulsification of the lens nucleus was accomplished in a phacoemulsification stop and chop technique. Remaining cortex was removed with irrigation and aspiration. The lens capsule was noted to be intact. Provisc was used to inflate the capsular bag.  The lens was injected into the capsular bag. Remaining viscoelastic was removed with  irrigation and aspiration. The wounds were checked and found to be watertight after hydration. The eyelid speculum was removed and CatarActive3 drops were placed into the operative eye. The patient tolerated the procedure well and was in stable condition on the way to the recovery area.     Caprice Jiménez MD

## 2017-06-20 NOTE — IP AVS SNAPSHOT
[facility]    [address]    [place] [address]    Phone:  [phone]    Fax:  [phone]                                       After Visit Summary   6/20/2017    Marisol Orta    MRN: [id number]           After Visit Summary Signature Page     I have received my discharge instructions, and my questions have been answered. I have discussed any challenges I see with this plan with the nurse or doctor.    ..........................................................................................................................................  Patient/Patient Representative Signature      ..........................................................................................................................................  Patient Representative Print Name and Relationship to Patient    ..................................................               ................................................  Date                                            Time    ..........................................................................................................................................  Reviewed by Signature/Title    ...................................................              ..............................................  Date                                                            Time

## 2017-06-20 NOTE — IP AVS SNAPSHOT
MRN:[id number]                      After Visit Summary   6/20/2017    Marisol Orta    MRN: [id number]           Thank you!     Thank you for choosing [facility] for your care. Our goal is always to provide you with excellent care. Hearing back from our patients is one way we can continue to improve our services. Please take a few minutes to complete the written survey that you may receive in the mail after you visit with us. Thank you!        Patient Information     Date Of Birth          1950        About your hospital stay     You were admitted on:  June 20, 2017 You last received care in the:  [facility]    You were discharged on:  June 20, 2017       Who to Call     For medical emergencies, please call 911.  For non-urgent questions about your medical care, please call your primary care provider or clinic, [phone]  For questions related to your surgery, please call your surgery clinic        Attending Provider     Provider Specialty    Caprice Jiménez MD Ophthalmology       Primary Care Provider Office Phone # Fax #    Kizzy Thomason [phone]      Your next 10 appointments already scheduled     Jun 21, 2017 12:45 PM CDT   [facility]    [address]  [place] [address] [phone]            Jun 27, 2017  8:15 AM CDT   PHYSICAL with Kizzy Thomason MD   [facility] ([facility]    [address] [id number] [phone]            Jun 27, 2017 12:45 PM CDT   Return Visit with Caprice Jiménez MD   [facility] [place] ([facility])    [address]  [place] [address] [phone]            Jul 28, 2017  8:45 AM CDT   Return Visit with Caprice Jiménez MD   [facility]    [address]  [place] [address]    [phone]              Further instructions from your care team       CATARACT SURGERY POST-OP INSTRUCTIONS  Dr. Caprice Jiménez  [phone]      *Continue using CatarActive3 four times a day in the operative eye.  *You should get 3 doses in today and 4 doses daily starting tomorrow.      Keep the eye shield taped in place unless putting drops in. We will remove it for you in the office tomorrow.      Light sensitivity may be noticed. Sunglasses may be worn for comfort.      Do not rub the operated eye.      Keep the operated eye dry. You may wash your hair, bathe or shower, but keep the operated eye closed while doing so.       No swimming, hot tub, or sauna for 2 weeks.      No make up around eye for 5 days.      No bending at the waist or lifting more than 10 pounds for one week.      May take Tylenol (per directions on bottle) for mild pain.      Call Dr. Jiménez's cell at [phone]  if any of the following should occur:    o Any sudden vision changes  o Nausea or severe headache  o Increase in pain not controlled  o Or signs of infection (pus, increasing redness or tenderness)    [facility] [place] [facility] Discharge  Instructions  Anesthesia (Eye Care Center)   Adult Discharge Instructions    For 24 hours after surgery    1. Get plenty of rest.  Make arrangements to have a responsible adult stay with you for at least 6 hours after you leave the hospital.  2. Do not drive or use heavy equipment for 24 hours.    3. Do not drink alcohol for 24 hours.  4. Do not sign legal documents or make important decisions for 24 hours.  5. Avoid strenuous or risky activities. You may feel lightheaded.  If so, sit for a few minutes before standing.  Have someone help you get up.   6. Conscious sedation patients may resume a regular diet..  7. Any questions of medical nature, call your physician.      Pending Results     No orders found from 6/18/2017 to 6/21/2017.            Admission Information      Date & Time Provider Department Dept. Phone    6/20/2017 Caprice Jiménez MD [facility] [phone]      Your Vitals Were     Blood Pressure Temperature Respirations Pulse Oximetry          112/52 97.3  F (36.3  C) (Temporal) 16 99%        MyChart Information     [employer] gives you secure access to your electronic health record. If you see a primary care provider, you can also send messages to your care team and make appointments. If you have questions, please call your primary care clinic.  If you do not have a primary care provider, please call [phone] and they will assist you.        Care EveryWhere ID     This is your Care EveryWhere ID. This could be used by other organizations to access your [facility] medical records  [id number]           Review of your medicines      CONTINUE these medicines which have NOT CHANGED        Dose / Directions    Bromfenac Sodium Powd   Used for:  Combined form of age-related cataract, both eyes        Dose:  1 Bottle   1 Bottle 4 times daily 1 drop four times a day into the operative eye starting 1 day before surgery. Use until bottle runs out.   Quantity:  1 Bottle   Refills:  1       CALCIUM 600+D PO        2 tablet daily   Refills:  0       Dexamethasone Acetate Powd   Used for:  Combined form of age-related cataract, both eyes        Dose:  1 Bottle   1 Bottle 4 times daily 1 drop four times a day into the operative eye starting 1 day before surgery. Use until bottle runs out.   Quantity:  1 Bottle   Refills:  1       lisinopril 2.5 MG tablet   Commonly known as:  PRINIVIL/Zestril   Used for:  Essential hypertension with goal blood pressure less than 140/90        Dose:  2.5 mg   Take 1 tablet (2.5 mg) by mouth daily   Quantity:  90 tablet   Refills:  1       Moxifloxacin HCl Powd   Used for:  Combined form of age-related cataract, both eyes        Dose:  1 Bottle   1 Bottle 4 times daily 1 drop four times a day into the operative eye starting 1  day before surgery. Use until bottle runs out.   Quantity:  1 Bottle   Refills:  1       MULTIVITAMIN GUMMIES ADULT PO        Refills:  0       * triamterene-hydrochlorothiazide 37.5-25 MG per capsule   Commonly known as:  DYAZIDE        Dose:  1 capsule   Take 1 capsule by mouth daily   Refills:  0       * triamterene-hydrochlorothiazide 37.5-25 MG per capsule   Commonly known as:  DYAZIDE   Used for:  Essential hypertension with goal blood pressure less than 140/90        Dose:  1 capsule   Take 1 capsule by mouth every morning   Quantity:  90 capsule   Refills:  1       * Notice:  This list has 2 medication(s) that are the same as other medications prescribed for you. Read the directions carefully, and ask your doctor or other care provider to review them with you.             Protect others around you: Learn how to safely use, store and throw away your medicines at www.disposemymeds.org.             Medication List: This is a list of all your medications and when to take them. Check marks below indicate your daily home schedule. Keep this list as a reference.      Medications           Morning Afternoon Evening Bedtime As Needed    Bromfenac Sodium Powd   1 Bottle 4 times daily 1 drop four times a day into the operative eye starting 1 day before surgery. Use until bottle runs out.                                CALCIUM 600+D PO   2 tablet daily                                Dexamethasone Acetate Powd   1 Bottle 4 times daily 1 drop four times a day into the operative eye starting 1 day before surgery. Use until bottle runs out.                                lisinopril 2.5 MG tablet   Commonly known as:  PRINIVIL/Zestril   Take 1 tablet (2.5 mg) by mouth daily                                Moxifloxacin HCl Powd   1 Bottle 4 times daily 1 drop four times a day into the operative eye starting 1 day before surgery. Use until bottle runs out.                                MULTIVITAMIN GUMMIES ADULT PO                                 * triamterene-hydrochlorothiazide 37.5-25 MG per capsule   Commonly known as:  DYAZIDE   Take 1 capsule by mouth daily                                * triamterene-hydrochlorothiazide 37.5-25 MG per capsule   Commonly known as:  DYAZIDE   Take 1 capsule by mouth every morning                                * Notice:  This list has 2 medication(s) that are the same as other medications prescribed for you. Read the directions carefully, and ask your doctor or other care provider to review them with you.

## 2017-06-21 ENCOUNTER — OFFICE VISIT (OUTPATIENT)
Dept: OPHTHALMOLOGY | Facility: CLINIC | Age: 67
End: 2017-06-21
Payer: MEDICARE

## 2017-06-21 DIAGNOSIS — H26.8 PSEUDOEXFOLIATION OF LENS CAPSULE: ICD-10-CM

## 2017-06-21 DIAGNOSIS — H40.003 GLAUCOMA SUSPECT, BILATERAL: ICD-10-CM

## 2017-06-21 DIAGNOSIS — Z96.1 PSEUDOPHAKIA OF BOTH EYES: Primary | ICD-10-CM

## 2017-06-21 PROCEDURE — 99024 POSTOP FOLLOW-UP VISIT: CPT | Performed by: STUDENT IN AN ORGANIZED HEALTH CARE EDUCATION/TRAINING PROGRAM

## 2017-06-21 ASSESSMENT — VISUAL ACUITY
METHOD: SNELLEN - LINEAR
OD_SC: 20/70
OD_SC+: -1
OS_SC: 20/25
OS_SC+: -1
OD_PH_SC: 20/40+1

## 2017-06-21 ASSESSMENT — SLIT LAMP EXAM - LIDS: COMMENTS: NORMAL

## 2017-06-21 ASSESSMENT — TONOMETRY: OD_IOP_MMHG: 27

## 2017-06-21 ASSESSMENT — EXTERNAL EXAM - RIGHT EYE: OD_EXAM: NORMAL

## 2017-06-21 NOTE — PATIENT INSTRUCTIONS
POST-OP CATARACT INSTRUCTIONS    *   Use the following drop(s) in  BOTH EYES  four times a day:        CatarActive 3    *   If you are taking glaucoma drops, continue as usual.    *   Wear eye shield when sleeping for one week.    *   No eye rubbing or lifting more than 10 pounds for one week.    *   Keep water out of eye for two weeks.    *   OK to resume aspirin and/or other blood thinners.    *   Return as scheduled in about one week.    *   If your vision worsens, eye becomes increasingly red, or becomes painful, call [phone].     Caprice Jiménez M.D.

## 2017-06-21 NOTE — PROGRESS NOTES
Current Eye Medications:  cataractive3 QID both eyes     Subjective: here for PO1 right eye, is a little blurred but no pain.  Very dilated still.     Objective:  See Ophthalmology Exam.      Assessment:  Marisol Orta is a 67 year old female who presents with:   Encounter Diagnoses   Name Primary?     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     Glaucoma suspect, bilateral      Pseudoexfoliation of lens capsule, bilateral        Plan:  POST-OP CATARACT INSTRUCTIONS    *   Use the following drop(s) in  BOTH EYES  four times a day:        CatarActive 3  *   If you are taking glaucoma drops, continue as usual.  *   Wear eye shield when sleeping for one week.  *   No eye rubbing or lifting more than 10 pounds for one week.  *   Keep water out of eye for two weeks.  *   OK to resume aspirin and/or other blood thinners.  *   Return as scheduled in about one week.  *   If your vision worsens, eye becomes increasingly red, or becomes painful, call [phone].     Caprice Jiménez M.D.

## 2017-06-21 NOTE — MR AVS SNAPSHOT
After Visit Summary   6/21/2017    Marisol Orta    MRN: [id number]           Patient Information     Date Of Birth          1950        Visit Information        Provider Department      6/21/2017 12:45 PM Caprice Jiménez MD [facility] [place]        Today's Diagnoses     Pseudophakia of both eyes    -  1    Glaucoma suspect, bilateral        Pseudoexfoliation of lens capsule, bilateral          Care Instructions    POST-OP CATARACT INSTRUCTIONS    *   Use the following drop(s) in  BOTH EYES  four times a day:        CatarActive 3    *   If you are taking glaucoma drops, continue as usual.    *   Wear eye shield when sleeping for one week.    *   No eye rubbing or lifting more than 10 pounds for one week.    *   Keep water out of eye for two weeks.    *   OK to resume aspirin and/or other blood thinners.    *   Return as scheduled in about one week.    *   If your vision worsens, eye becomes increasingly red, or becomes painful, call [phone].     Caprice Jiménez M.D.            Follow-ups after your visit        Follow-up notes from your care team     Return for as scheduled, PO2.      Your next 10 appointments already scheduled     Jun 27, 2017  8:15 AM CDT   PHYS[facility] with Kizzy Thomason MD   [facility]    [address] [address] [phone]            Jun 27, 2017 12:45 PM CDT   [facility] [place] [facility] [id number] [phone]            Jul 28, 2017  8:45 AM CDT   [facility] ([facility])    [address] [place] [address] [phone]              Who to contact     If you have questions or need follow up information about today's clinic visit or your schedule please contact [facility] directly at  [phone].  Normal or non-critical lab and imaging results will be communicated to you by MyChart, letter or phone within 4 business days after the clinic has received the results. If you do not hear from us within 7 days, please contact the clinic through [employer] or phone. If you have a critical or abnormal lab result, we will notify you by phone as soon as possible.  Submit refill requests through [employer] or call your pharmacy and they will forward the refill request to us. Please allow 3 business days for your refill to be completed.          Additional Information About Your Visit        [employer] Information     [employer] gives you secure access to your electronic health record. If you see a primary care provider, you can also send messages to your care team and make appointments. If you have questions, please call your primary care clinic.  If you do not have a primary care provider, please call [phone] and they will assist you.        Care EveryWhere ID     This is your Care EveryWhere ID. This could be used by other organizations to access your [facility] medical records  [id number]         Blood Pressure from Last 3 Encounters:   06/20/17 117/64   06/06/17 116/66   06/01/17 138/82    Weight from Last 3 Encounters:   06/01/17 88 kg (194 lb)   02/08/17 86.6 kg (191 lb)   12/27/16 87.5 kg (193 lb)              Today, you had the following     No orders found for display       Primary Care Provider Office Phone # Fax #    Kizzy Juliana Thomason [phone]       [facility] [address] [facility] [address]        Equal Access to Services     [facility] : Hadii aad ku hadasho Soomaali, waaxda luqadaha, qaybta kaalmada kaya, lori barahona. So [place] [facility] [phone].    ATENCIÓN: Si habla español, tiene a turner disposición servicios gratuitos de asistencia lingüística. Llame al [phone].    We comply with applicable federal civil rights laws and  [place] laws. We do not discriminate on the basis of race, color, national origin, age, disability sex, sexual orientation or gender identity.            Thank you!     Thank you for choosing [facility]  for your care. Our goal is always to provide you with excellent care. Hearing back from our patients is one way we can continue to improve our services. Please take a few minutes to complete the written survey that you may receive in the mail after your visit with us. Thank you!             Your Updated Medication List - Protect others around you: Learn how to safely use, store and throw away your medicines at www.disposemymeds.org.          This list is accurate as of: 6/21/17  1:00 PM.  Always use your most recent med list.                   Brand Name Dispense Instructions for use Diagnosis    Bromfenac Sodium Powd     1 Bottle    1 Bottle 4 times daily 1 drop four times a day into the operative eye starting 1 day before surgery. Use until bottle runs out.    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 Use until bottle runs out.    Combined form of age-related cataract, both eyes       lisinopril 2.5 MG tablet    PRINIVIL/Zestril    90 tablet    Take 1 tablet (2.5 mg) by mouth daily    Essential hypertension with goal blood pressure less than 140/90       Moxifloxacin HCl Powd     1 Bottle    1 Bottle 4 times daily 1 drop four times a day into the operative eye starting 1 day before surgery. Use until bottle runs out.    Combined form of age-related cataract, both eyes       MULTIVITAMIN GUMMIES ADULT PO           * triamterene-hydrochlorothiazide 37.5-25 MG per capsule    DYAZIDE     Take 1 capsule by mouth daily        * triamterene-hydrochlorothiazide 37.5-25 MG per capsule    DYAZIDE    90 capsule     Take 1 capsule by mouth every morning    Essential hypertension with goal blood pressure less than 140/90       * Notice:  This list has 2 medication(s) that are the same as other medications prescribed for you. Read the directions carefully, and ask your doctor or other care provider to review them with you.

## 2017-06-24 DIAGNOSIS — I10 ESSENTIAL HYPERTENSION WITH GOAL BLOOD PRESSURE LESS THAN 140/90: ICD-10-CM

## 2017-06-26 RX ORDER — LISINOPRIL 2.5 MG/1
TABLET ORAL
Qty: 90 TABLET | Refills: 1 | Status: SHIPPED | OUTPATIENT
Start: 2017-06-26 | End: 2018-01-10

## 2017-06-27 ENCOUNTER — OFFICE VISIT (OUTPATIENT)
Dept: FAMILY MEDICINE | Facility: CLINIC | Age: 67
End: 2017-06-27
Payer: MEDICARE

## 2017-06-27 ENCOUNTER — OFFICE VISIT (OUTPATIENT)
Dept: OPHTHALMOLOGY | Facility: CLINIC | Age: 67
End: 2017-06-27
Payer: MEDICARE

## 2017-06-27 VITALS
DIASTOLIC BLOOD PRESSURE: 79 MMHG | TEMPERATURE: 98.1 F | SYSTOLIC BLOOD PRESSURE: 131 MMHG | HEART RATE: 69 BPM | WEIGHT: 195 LBS | OXYGEN SATURATION: 98 % | HEIGHT: 63 IN | BODY MASS INDEX: 34.55 KG/M2

## 2017-06-27 DIAGNOSIS — H40.003 GLAUCOMA SUSPECT, BILATERAL: ICD-10-CM

## 2017-06-27 DIAGNOSIS — H26.8 PSEUDOEXFOLIATION OF LENS CAPSULE: ICD-10-CM

## 2017-06-27 DIAGNOSIS — Z00.00 MEDICARE WELCOME VISIT: Primary | ICD-10-CM

## 2017-06-27 DIAGNOSIS — Z78.0 ASYMPTOMATIC MENOPAUSAL STATE: ICD-10-CM

## 2017-06-27 DIAGNOSIS — Z23 NEED FOR PNEUMOCOCCAL VACCINATION: ICD-10-CM

## 2017-06-27 DIAGNOSIS — M54.16 LUMBAR RADICULOPATHY: ICD-10-CM

## 2017-06-27 DIAGNOSIS — Z96.1 PSEUDOPHAKIA OF BOTH EYES: Primary | ICD-10-CM

## 2017-06-27 DIAGNOSIS — I10 HYPERTENSION GOAL BP (BLOOD PRESSURE) < 140/90: ICD-10-CM

## 2017-06-27 PROCEDURE — 99207 HC VACCINE ADMINISTRATION, INITIAL: CPT | Mod: 25 | Performed by: FAMILY MEDICINE

## 2017-06-27 PROCEDURE — 90670 PCV13 VACCINE IM: CPT | Performed by: FAMILY MEDICINE

## 2017-06-27 PROCEDURE — 99024 POSTOP FOLLOW-UP VISIT: CPT | Performed by: STUDENT IN AN ORGANIZED HEALTH CARE EDUCATION/TRAINING PROGRAM

## 2017-06-27 PROCEDURE — G0009 ADMIN PNEUMOCOCCAL VACCINE: HCPCS | Performed by: FAMILY MEDICINE

## 2017-06-27 PROCEDURE — G0402 INITIAL PREVENTIVE EXAM: HCPCS | Performed by: FAMILY MEDICINE

## 2017-06-27 RX ORDER — LISINOPRIL 2.5 MG/1
2.5 TABLET ORAL DAILY
Qty: 90 TABLET | Refills: 1 | Status: CANCELLED | OUTPATIENT
Start: 2017-06-27

## 2017-06-27 RX ORDER — TRIAMTERENE AND HYDROCHLOROTHIAZIDE 37.5; 25 MG/1; MG/1
1 CAPSULE ORAL EVERY MORNING
Qty: 90 CAPSULE | Refills: 1 | Status: SHIPPED | OUTPATIENT
Start: 2017-06-27 | End: 2018-01-10

## 2017-06-27 ASSESSMENT — TONOMETRY: OD_IOP_MMHG: 18

## 2017-06-27 ASSESSMENT — SLIT LAMP EXAM - LIDS: COMMENTS: NORMAL

## 2017-06-27 ASSESSMENT — REFRACTION_MANIFEST
OD_CYLINDER: +1.50
OD_AXIS: 043
OD_SPHERE: -0.50

## 2017-06-27 ASSESSMENT — VISUAL ACUITY
METHOD: SNELLEN - LINEAR
OD_SC: 20/40-2

## 2017-06-27 ASSESSMENT — EXTERNAL EXAM - RIGHT EYE: OD_EXAM: NORMAL

## 2017-06-27 NOTE — PROGRESS NOTES
Current Eye Medications:  cataractive3 qid od     Subjective:  Po2 od    Pt reports sees good and this eye feels fine.   Objective:  See Ophthalmology Exam.      Assessment:  Marisol Orta is a 67 year old female who presents with:     Pseudophakia of both eyes po2 right eye, doing well     Glaucoma suspect, bilateral      Pseudoexfoliation of lens capsule, bilateral      Plan:  *   For the right eye:    CatarActive 3  *   Stop wearing eye shield.  *   No eye rubbing. May resume heavy lifting.  *   If you are taking glaucoma drops, continue as usual.  *   Return one month for final exam with glasses check.  *   If your vision worsens, eye becomes increasingly red, or becomes painful, call [phone].    Caprice Jiménez M.D.

## 2017-06-27 NOTE — NURSING NOTE
"Chief Complaint   Patient presents with     Physical       Initial /79  Pulse 69  Temp 98.1  F (36.7  C) (Oral)  Ht 5' 3\" (1.6 m)  Wt 195 lb (88.5 kg)  SpO2 98%  BMI 34.54 kg/m2 Estimated body mass index is 34.54 kg/(m^2) as calculated from the following:    Height as of this encounter: 5' 3\" (1.6 m).    Weight as of this encounter: 195 lb (88.5 kg).  Medication Reconciliation: complete    Mary Jo Blake MA    "

## 2017-06-27 NOTE — PROGRESS NOTES
SUBJECTIVE:                                                            Marisol Orta is a 67 year old female who presents for Preventive Visit.    Are you in the first 12 months of your Medicare coverage?  Yes,  Visual Acuity:  Right Eye: 20/30   Left Eye: 20/25  Both Eyes: 20/20    Pt just had last cataract surgery last Tuesday. Has follow up today.       Physical   Annual:     Getting at least 3 servings of Calcium per day::  Yes    Bi-annual eye exam::  Yes    Dental care twice a year::  Yes    Sleep apnea or symptoms of sleep apnea::  None    Diet::  Regular (no restrictions)    Frequency of exercise::  2-3 days/week    Duration of exercise::  Greater than 60 minutes    Taking medications regularly::  Yes    Medication side effects::  None    Additional concerns today::  YES      COGNITIVE SCREEN  1) Repeat 3 items (Banana, Sunrise, Chair)    2) Clock draw: NORMAL  3) 3 item recall: Recalls 3 objects  Results: NORMAL clock, 1-2 items recalled: COGNITIVE IMPAIRMENT LESS LIKELY    Mini-CogTM Copyright ANA Daniel. Licensed by the author for use in [facility]; reprinted with permission (kei@[email or web address]). All rights reserved.        Reviewed and updated as needed this visit by clinical staff  Tobacco  Allergies  Meds  Problems  Med Hx  Surg Hx  Fam Hx  Soc Hx          Reviewed and updated as needed this visit by Provider  Allergies  Meds  Problems        Social History   Substance Use Topics     Smoking status: Never Smoker     Smokeless tobacco: Never Used      Comment: smoke free household     Alcohol use No       The patient does not drink >3 drinks per day nor >7 drinks per week.      Left leg numbness along lateral thigh, return of back pain and shooting pain down left leg, in 2000 had MRI and injection x 2 which helped.  Not doing her back exercises since cataract surgery but will restart once given the all clear.      Left upper back pain (trapezius) with turning head to right.         Today's PHQ-2 Score:   PHQ-2 (  [employer]) 2017   Q1: Little interest or pleasure in doing things Not at all   Q2: Feeling down, depressed or hopeless Not at all   PHQ-2 Score 0       Do you feel safe in your environment - Yes    Do you have a Health Care Directive?: Yes: Patient states has Advance Directive and will bring in a copy to clinic.    G 2 P 2   No LMP recorded. Patient is postmenopausal.     Fasting: No   Td: tdap        Last 3 Pap Results:   PAP (no units)   Date Value   10/09/2013 NIL   2010 NIL   2009 NIL        HPV: neg           Cholesterol:   Lab Results   Component Value Date    CHOL 209 2016     Lab Results   Component Value Date    HDL 74 2016     Lab Results   Component Value Date     2016     Lab Results   Component Value Date    TRIG 143 2016     Lab Results   Component Value Date    CHOLHDLRATIO 2.6 2014         MM/16  Dexa:  due     Flex/colo:       Seat Belt: Yes    Sunscreen use: Yes   Calcium Intake: adeq  Health Care Directive: No  Sexually Active: Yes     Current contraception: none  History of abnormal Pap smear: No  Family history of colon/breast/ovarian cancer: Yes: see [facility]  Regular self breast exam: No  History of abnormal mammogram: No      Current providers sharing in care for this patient include:   Patient Care Team:  Kizzy Thomason MD as PCP - General (Family Practice)      Hearing impairment: No    Ability to successfully perform activities of daily living: Yes, no assistance needed     Fall risk:       Home safety:  none identified      The following health maintenance items are reviewed in Epic and correct as of today:  Health Maintenance   Topic Date Due     DEXA SCAN SCREENING (SYSTEM ASSIGNED)  2017     PNEUMOCOCCAL (1 of 2 - PCV13) 2017     INFLUENZA VACCINE (SYSTEM ASSIGNED)  2017     BMP Q6 MOS  2017     EYE EXAM Q1 YEAR  2018     FALL RISK ASSESSMENT   06/01/2018     MAMMO SCREEN Q2 YR (SYSTEM ASSIGNED)  11/10/2018     TETANUS IMMUNIZATION (SYSTEM ASSIGNED)  06/02/2020     LIPID SCREEN Q5 YR FEMALE (SYSTEM ASSIGNED)  07/20/2021     ADVANCE DIRECTIVE PLANNING Q5 YRS  07/27/2021     COLONOSCOPY Q10 YR  05/16/2022     HEPATITIS C SCREENING  Completed         Pneumonia Vaccine:Adults age 65+ who received their first dose of Pneumovax (PPSV23) prior to age 65 years: Should be given PCV 13 > 1 year after their most recent PPSV23 AND should be given a another dose of PPSV23 > 5 years after their most recent dose of PPSV23  Mammogram Screening: Patient over age 50, mutual decision to screen reflected in health maintenance.     ROS:  Constitutional, HEENT, cardiovascular, pulmonary, gi and gu systems are negative, except as otherwise noted.    Labs reviewed in EPIC  BP Readings from Last 3 Encounters:   06/27/17 131/79   06/20/17 117/64   06/06/17 116/66    Wt Readings from Last 3 Encounters:   06/27/17 195 lb (88.5 kg)   06/01/17 194 lb (88 kg)   02/08/17 191 lb (86.6 kg)                  Patient Active Problem List   Diagnosis     Back pain     Osteoarthritis     Hypertension goal BP (blood pressure) < 140/90     Vitreous membranes and strands     Advanced directives, counseling/discussion     CARDIOVASCULAR SCREENING; LDL GOAL LESS THAN 160     Combined form of age-related cataract, right eye     Pseudophakia of both eyes     Past Surgical History:   Procedure Laterality Date     APPENDECTOMY       CATARACT IOL, RT/LT       COLONOSCOPY  2012?     FOOT SURGERY      left foot 1st  fusion     ORTHOPEDIC SURGERY       PHACOEMULSIFICATION CLEAR CORNEA WITH STANDARD INTRAOCULAR LENS IMPLANT Left 6/6/2017    Procedure: PHACOEMULSIFICATION CLEAR CORNEA WITH STANDARD INTRAOCULAR LENS IMPLANT;  LEFT EYE PHACOEMULSIFICATION CLEAR CORNEA WITH STANDARD INTRAOCULAR LENS IMPLANT ;  Surgeon: Caprice Jiménez MD;  Location: [facility]     PHACOEMULSIFICATION CLEAR CORNEA WITH STANDARD  INTRAOCULAR LENS IMPLANT Right 6/20/2017    Procedure: PHACOEMULSIFICATION CLEAR CORNEA WITH STANDARD INTRAOCULAR LENS IMPLANT;  RIGHT EYE PHACOEMULSIFICATION CLEAR CORNEA WITH STANDARD INTRAOCULAR LENS IMPLANT;  Surgeon: Caprice Jiménez MD;  Location: [facility]       Social History   Substance Use Topics     Smoking status: Never Smoker     Smokeless tobacco: Never Used      Comment: smoke free household     Alcohol use No     Family History   Problem Relation Age of Onset     Lipids Mother      Cancer - colorectal Mother 70     Hypertension Mother      Arthritis Mother      CANCER Mother 84     lung cancer      Thyroid Disease Mother      Colon Cancer Mother      Other Cancer Mother      Arthritis Father      Hypertension Father      Macular Degeneration Father 85     monitors visison with [employer] grid, more trouble reading     Depression Father      Anxiety Disorder Father      DIABETES Maternal Grandmother      DIABETES Paternal Grandmother      Asthma Brother      Depression Brother      Anxiety Disorder Brother      Thyroid Disease Brother      Hypertension Sister      Thyroid Disease Sister      Eye Disorder Son 25     keratoconus?     Glaucoma Daughter 18     montoring pressure     Glaucoma Paternal Aunt      DIABETES Paternal Aunt      CEREBROVASCULAR DISEASE No family hx of          Current Outpatient Prescriptions   Medication Sig Dispense Refill     lisinopril (PRINIVIL/ZESTRIL) 2.5 MG tablet TAKE 1 TABLET (2.5 MG) BY MOUTH DAILY 90 tablet 1     Bromfenac Sodium POWD 1 Bottle 4 times daily 1 drop four times a day into the operative eye starting 1 day before surgery. Use until bottle runs out. 1 Bottle 1     Dexamethasone Acetate POWD 1 Bottle 4 times daily 1 drop four times a day into the operative eye starting 1 day before surgery. Use until bottle runs out. 1 Bottle 1     Moxifloxacin HCl POWD 1 Bottle 4 times daily 1 drop four times a day into the operative eye starting 1 day before surgery. Use until  "bottle runs out. 1 Bottle 1     triamterene-hydrochlorothiazide (DYAZIDE) 37.5-25 MG per capsule Take 1 capsule by mouth every morning 90 capsule 1     Multiple Vitamins-Minerals (MULTIVITAMIN GUMMIES ADULT PO)        CALCIUM 600+D PO 2 tablet daily       [DISCONTINUED] triamterene-hydrochlorothiazide (DYAZIDE) 37.5-25 MG per capsule Take 1 capsule by mouth daily       OBJECTIVE:                                                            /79  Pulse 69  Temp 98.1  F (36.7  C) (Oral)  Ht 5' 3\" (1.6 m)  Wt 195 lb (88.5 kg)  SpO2 98%  BMI 34.54 kg/m2 Estimated body mass index is 34.54 kg/(m^2) as calculated from the following:    Height as of this encounter: 5' 3\" (1.6 m).    Weight as of this encounter: 195 lb (88.5 kg).  EXAM:   GENERAL APPEARANCE: healthy, alert and no distress  EYES: Eyes grossly normal to inspection, PERRL and conjunctivae and sclerae normal  HENT: ear canals and TM's normal, nose and mouth without ulcers or lesions, oropharynx clear and oral mucous membranes moist  NECK: no adenopathy, no asymmetry, masses, or scars and thyroid normal to palpation  RESP: lungs clear to auscultation - no rales, rhonchi or wheezes  BREAST: normal without masses, tenderness or nipple discharge and no palpable axillary masses or adenopathy  CV: regular rate and rhythm, normal S1 S2, no S3 or S4, no murmur, click or rub, no peripheral edema and peripheral pulses strong  ABDOMEN: soft, nontender, no hepatosplenomegaly, no masses and bowel sounds normal  MS: no musculoskeletal defects are noted and gait is age appropriate without ataxia  SKIN: no suspicious lesions or rashes  NEURO: Normal strength and tone, sensory exam grossly normal, mentation intact and speech normal  PSYCH: mentation appears normal and affect normal/bright    ASSESSMENT / PLAN:                                                            (Z00.00) Medicare welcome visit  (primary encounter diagnosis)  Comment: preventive needs " "reviewed  Plan: see orders in Epic.   Some upper back pain on left, likely trap vs rhomboid strain, trial of exercises and heat. Consider PT if needed    (I10) Hypertension goal BP (blood pressure) < 140/90  Comment: to goal  Plan: triamterene-hydrochlorothiazide (DYAZIDE)         37.5-25 MG per capsule         Refill x 6 months     (M54.16) Lumbar radiculopathy  Comment: more symptoms  Plan: pt to restart exercises, if no improvement consider PT vs repeat injection after repeat MRI    (Z78.0) Asymptomatic menopausal state  Comment: due  Plan: DX Hip/Pelvis/Spine            (Z23) Need for pneumococcal vaccination  Comment: due  Plan: Pneumococcal vaccine 13 valent PCV13 IM         (Prevnar) [44493], ADMIN: Vaccine, Initial         (75943), ADMIN MEDICARE: Pneumococcal Vaccine         ()              End of Life Planning:  Patient currently has an advanced directive: No.  I have verified the patient's ablity to prepare an advanced directive/make health care decisions.  Literature was provided to assist patient in preparing an advanced directive.    COUNSELING:  Reviewed preventive health counseling, as reflected in patient instructions  Special attention given to:       Regular exercise       Healthy diet/nutrition        Estimated body mass index is 34.54 kg/(m^2) as calculated from the following:    Height as of this encounter: 5' 3\" (1.6 m).    Weight as of this encounter: 195 lb (88.5 kg).  Weight management plan: Discussed healthy diet and exercise guidelines and patient will follow up in 6 months in clinic to re-evaluate.   reports that she has never smoked. She has never used smokeless tobacco.      Appropriate preventive services were discussed with this patient, including applicable screening as appropriate for cardiovascular disease, diabetes, osteopenia/osteoporosis, and glaucoma.  As appropriate for age/gender, discussed screening for colorectal cancer, prostate cancer, breast cancer, and cervical " cancer. Checklist reviewing preventive services available has been given to the patient.    Reviewed patients plan of care and provided an AVS. The Basic Care Plan (routine screening as documented in Health Maintenance) for Marisol meets the Care Plan requirement. This Care Plan has been established and reviewed with the Patient.    Counseling Resources:  ATP IV Guidelines  Pooled Cohorts Equation Calculator  Breast Cancer Risk Calculator  FRAX Risk Assessment  ICSI Preventive Guidelines  Dietary Guidelines for Americans, 2010  USDA's MyPlate  ASA Prophylaxis  Lung CA Screening    Kizzy Thomason MD  [facility]

## 2017-06-27 NOTE — MR AVS SNAPSHOT
After Visit Summary   6/27/2017    Marisol Orta    MRN: [id number]           Patient Information     Date Of Birth          1950        Visit Information        Provider Department      6/27/2017 12:45 PM Caprice Jiménez MD [facility]        Today's Diagnoses     Pseudophakia of both eyes    -  1    Glaucoma suspect, bilateral        Pseudoexfoliation of lens capsule, bilateral          Care Instructions    *   For the right eye:   CatarActive 3    *   Stop wearing eye shield.    *   No eye rubbing. May resume heavy lifting.    *   If you are taking glaucoma drops, continue as usual.    *   Return one month for final exam with glasses check.    *   If your vision worsens, eye becomes increasingly red, or becomes painful, call [phone].    Caprice Jiménez M.D.          Follow-ups after your visit        Follow-up notes from your care team     Return for as scheduled, Final MR.      Your next 10 appointments already scheduled     Jul 28, 2017  8:45 AM CDT   Return Visit with Caprice Jiménez MD   [facility] ([address] [id number] [phone]              Future tests that were ordered for you today     Open Future Orders        Priority Expected Expires Ordered    DX Hip/Pelvis/Spine Routine  6/27/2018 6/27/2017            Who to contact     If you have questions or need follow up information about today's clinic visit or your schedule please contact [facility] directly at [phone].  Normal or non-critical lab and imaging results will be communicated to you by MyChart, letter or phone within 4 business days after the clinic has received the results. If you do not hear from us within 7 days, please contact the clinic through MyChart or phone. If you have a critical or abnormal lab result, we will notify you by phone as soon as possible.  Submit refill requests through [employer] or call your  pharmacy and they will forward the refill request to us. Please allow 3 business days for your refill to be completed.          Additional Information About Your Visit        MyChart Information     [employer] gives you secure access to your electronic health record. If you see a primary care provider, you can also send messages to your care team and make appointments. If you have questions, please call your primary care clinic.  If you do not have a primary care provider, please call [phone] and they will assist you.        Care EveryWhere ID     This is your Care EveryWhere ID. This could be used by other organizations to access your [place] medical records  [id number]         Blood Pressure from Last 3 Encounters:   06/27/17 131/79   06/20/17 117/64   06/06/17 116/66    Weight from Last 3 Encounters:   06/27/17 88.5 kg (195 lb)   06/01/17 88 kg (194 lb)   02/08/17 86.6 kg (191 lb)              Today, you had the following     No orders found for display         Where to get your medicines      These medications were sent to [facility]/pharmacy #[id number] - [place], [place] - [address],  [address] [facility]  [address], , [facility] [address]     Phone:  [phone]     triamterene-hydrochlorothiazide 37.5-25 MG per capsule          Primary Care Provider Office Phone # Fax #    Kizzy Thomason [phone]       [facility] [address] [facility] [address]        Equal Access to Services     MATILDE NUGENT AH: Hadii aad ku hadasho Soomaali, waaxda luqadaha, qaybta kaalmada adeegyada, lori barahona. So [facility] [phone].    ATENCIÓN: Si habla español, tiene a turner disposición servicios gratuitos de asistencia lingüística. Llame al [phone].    We comply with applicable federal civil rights laws and [place] laws. We do not discriminate on the basis of race, color, national origin, age, disability sex, sexual orientation or  gender identity.            Thank you!     Thank you for choosing [facility]  for your care. Our goal is always to provide you with excellent care. Hearing back from our patients is one way we can continue to improve our services. Please take a few minutes to complete the written survey that you may receive in the mail after your visit with us. Thank you!             Your Updated Medication List - Protect others around you: Learn how to safely use, store and throw away your medicines at www.disposemymeds.org.          This list is accurate as of: 6/27/17  1:01 PM.  Always use your most recent med list.                   Brand Name Dispense Instructions for use Diagnosis    Bromfenac Sodium Powd     1 Bottle    1 Bottle 4 times daily 1 drop four times a day into the operative eye starting 1 day before surgery. Use until bottle runs out.    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 Use until bottle runs out.    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 Use until bottle runs out.    Combined form of age-related cataract, both eyes       MULTIVITAMIN GUMMIES ADULT PO           triamterene-hydrochlorothiazide 37.5-25 MG per capsule    DYAZIDE    90 capsule    Take 1 capsule by mouth every morning    Hypertension goal BP (blood pressure) < 140/90

## 2017-06-27 NOTE — PATIENT INSTRUCTIONS
*   For the right eye:   CatarActive 3    *   Stop wearing eye shield.    *   No eye rubbing. May resume heavy lifting.    *   If you are taking glaucoma drops, continue as usual.    *   Return one month for final exam with glasses check.    *   If your vision worsens, eye becomes increasingly red, or becomes painful, call [phone].    Caprice Jiménez M.D.

## 2017-06-27 NOTE — MR AVS SNAPSHOT
After Visit Summary   6/27/2017    Marisol Orta    MRN: [id number]           Patient Information     Date Of Birth          1950        Visit Information        Provider Department      6/27/2017 8:15 AM Kizzy Thomason MD [facility]        Today's Diagnoses     Hypertension goal BP (blood pressure) < 140/90    -  1    Essential hypertension with goal blood pressure less than 140/90        Lumbar radiculopathy        Asymptomatic menopausal state        Need for pneumococcal vaccination          Care Instructions      Please  call [phone] to schedule your dexa scan.     Notify Kizzy Thomason MD if back and leg symptoms do not improve with exercises.      Preventive Health Recommendations    Female Ages 65 +    Yearly exam:     See your health care provider every year in order to  o Review health changes.   o Discuss preventive care.    o Review your medicines if your doctor has prescribed any.      You no longer need a yearly Pap test unless you've had an abnormal Pap test in the past 10 years. If you have vaginal symptoms, such as bleeding or discharge, be sure to talk with your provider about a Pap test.      Every 1 to 2 years, have a mammogram.  If you are over 69, talk with your health care provider about whether or not you want to continue having screening mammograms.      Every 10 years, have a colonoscopy. Or, have a yearly FIT test (stool test). These exams will check for colon cancer.       Have a cholesterol test every 5 years, or more often if your doctor advises it.       Have a diabetes test (fasting glucose) every three years. If you are at risk for diabetes, you should have this test more often.       At age 65, have a bone density scan (DEXA) to check for osteoporosis (brittle bone disease).    Shots:    Get a flu shot each year.    Get a tetanus shot every 10 years.    Talk to your doctor about your pneumonia vaccines. There are now two you should  receive - Pneumovax (PPSV 23) and Prevnar (PCV 13).    Talk to your doctor about the shingles vaccine.    Talk to your doctor about the hepatitis B vaccine.    Nutrition:     Eat at least 5 servings of fruits and vegetables each day.      Eat whole-grain bread, whole-wheat pasta and brown rice instead of white grains and rice.      Talk to your provider about Calcium and Vitamin D.     Lifestyle    Exercise at least 150 minutes a week (30 minutes a day, 5 days a week). This will help you control your weight and prevent disease.      Limit alcohol to one drink per day.      No smoking.       Wear sunscreen to prevent skin cancer.       See your dentist twice a year for an exam and cleaning.      See your eye doctor every 1 to 2 years to screen for conditions such as glaucoma, macular degeneration and cataracts.          Follow-ups after your visit        Your next 10 appointments already scheduled     Jun 27, 2017 12:45 PM CDT   Return Visit with Caprice Jiménez MD   [facility]    [address] [address] [phone]            Jul 28, 2017  8:45 AM CDT   Return Visit with Caprice Jiménez MD   [facility]    [address] [address] [phone]              Future tests that were ordered for you today     Open Future Orders        Priority Expected Expires Ordered    DX Hip/Pelvis/Spine Routine  6/27/2018 6/27/2017            Who to contact     If you have questions or need follow up information about today's clinic visit or your schedule please contact [facility] directly at [phone].  Normal or non-critical lab and imaging results will be communicated to you by MyChart, letter or phone within 4 business days after the clinic has received the results. If you do not hear from us within 7 days, please contact the clinic through MyChart or phone. If you have a critical  "or abnormal lab result, we will notify you by phone as soon as possible.  Submit refill requests through [facility] or call your pharmacy and they will forward the refill request to us. Please allow 3 business days for your refill to be completed.          Additional Information About Your Visit        Schedulizehart Information     [facility] gives you secure access to your electronic health record. If you see a primary care provider, you can also send messages to your care team and make appointments. If you have questions, please call your primary care clinic.  If you do not have a primary care provider, please call [phone] and they will assist you.        Care EveryWhere ID     This is your Care EveryWhere ID. This could be used by other organizations to access your [place] medical records  [id number]        Your Vitals Were     Pulse Temperature Height Pulse Oximetry BMI (Body Mass Index)       69 98.1  F (36.7  C) (Oral) 5' 3\" (1.6 m) 98% 34.54 kg/m2        Blood Pressure from Last 3 Encounters:   06/27/17 131/79   06/20/17 117/64   06/06/17 116/66    Weight from Last 3 Encounters:   06/27/17 195 lb (88.5 kg)   06/01/17 194 lb (88 kg)   02/08/17 191 lb (86.6 kg)              We Performed the Following     ADMIN MEDICARE: Pneumococcal Vaccine ()     ADMIN: Vaccine, Initial (33455)     Pneumococcal vaccine 13 valent PCV13 IM (Prevnar) [32606]          Where to get your medicines      These medications were sent to [id number] - [facility] [address],  [address] [facility]  [address], [facility] [place] [address]     Phone:  [phone]     triamterene-hydrochlorothiazide 37.5-25 MG per capsule          Primary Care Provider Office Phone # Fax #    Kizzy Thomason [phone]       [facility] [address] [facility] [address] [facility] [address]        Equal Access to Services     MATILDE NUGENT AH: Lang Irizarry, steven yeager, qaybta " lori whitehead john paulrosario arie harp ah. So [facility] [phone].    ATENCIÓN: Si jana schultz, tiene a turner disposición servicios gratuitos de asistencia lingüística. Chioma al [phone].    We comply with applicable federal civil rights laws and [place] laws. We do not discriminate on the basis of race, color, national origin, age, disability sex, sexual orientation or gender identity.            Thank you!     Thank you for choosing [facility]  for your care. Our goal is always to provide you with excellent care. Hearing back from our patients is one way we can continue to improve our services. Please take a few minutes to complete the written survey that you may receive in the mail after your visit with us. Thank you!             Your Updated Medication List - Protect others around you: Learn how to safely use, store and throw away your medicines at www.disposemymeds.org.          This list is accurate as of: 6/27/17  8:50 AM.  Always use your most recent med list.                   Brand Name Dispense Instructions for use Diagnosis    Bromfenac Sodium Powd     1 Bottle    1 Bottle 4 times daily 1 drop four times a day into the operative eye starting 1 day before surgery. Use until bottle runs out.    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 Use until bottle runs out.    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 Use until bottle runs out.    Combined form of  age-related cataract, both eyes       MULTIVITAMIN GUMMIES ADULT PO           triamterene-hydrochlorothiazide 37.5-25 MG per capsule    DYAZIDE    90 capsule    Take 1 capsule by mouth every morning    Essential hypertension with goal blood pressure less than 140/90

## 2017-06-27 NOTE — PATIENT INSTRUCTIONS
Please  call [phone] to schedule your dexa scan.     Notify Kizzy Thomason MD if back and leg symptoms do not improve with exercises.      Preventive Health Recommendations    Female Ages 65 +    Yearly exam:     See your health care provider every year in order to  o Review health changes.   o Discuss preventive care.    o Review your medicines if your doctor has prescribed any.      You no longer need a yearly Pap test unless you've had an abnormal Pap test in the past 10 years. If you have vaginal symptoms, such as bleeding or discharge, be sure to talk with your provider about a Pap test.      Every 1 to 2 years, have a mammogram.  If you are over 69, talk with your health care provider about whether or not you want to continue having screening mammograms.      Every 10 years, have a colonoscopy. Or, have a yearly FIT test (stool test). These exams will check for colon cancer.       Have a cholesterol test every 5 years, or more often if your doctor advises it.       Have a diabetes test (fasting glucose) every three years. If you are at risk for diabetes, you should have this test more often.       At age 65, have a bone density scan (DEXA) to check for osteoporosis (brittle bone disease).    Shots:    Get a flu shot each year.    Get a tetanus shot every 10 years.    Talk to your doctor about your pneumonia vaccines. There are now two you should receive - Pneumovax (PPSV 23) and Prevnar (PCV 13).    Talk to your doctor about the shingles vaccine.    Talk to your doctor about the hepatitis B vaccine.    Nutrition:     Eat at least 5 servings of fruits and vegetables each day.      Eat whole-grain bread, whole-wheat pasta and brown rice instead of white grains and rice.      Talk to your provider about Calcium and Vitamin D.     Lifestyle    Exercise at least 150 minutes a week (30 minutes a day, 5 days a week). This will help you control your weight and prevent disease.      Limit alcohol to one drink per  day.      No smoking.       Wear sunscreen to prevent skin cancer.       See your dentist twice a year for an exam and cleaning.      See your eye doctor every 1 to 2 years to screen for conditions such as glaucoma, macular degeneration and cataracts.  Preventive Health Recommendations    Female Ages 65 +    Yearly exam:   See your health care provider every year in order to  Review health changes.   Discuss preventive care.    Review your medicines if your doctor has prescribed any.    You no longer need a yearly Pap test unless you've had an abnormal Pap test in the past 10 years. If you have vaginal symptoms, such as bleeding or discharge, be sure to talk with your provider about a Pap test.    Every 1 to 2 years, have a mammogram.  If you are over 69, talk with your health care provider about whether or not you want to continue having screening mammograms.    Every 10 years, have a colonoscopy. Or, have a yearly FIT test (stool test). These exams will check for colon cancer.     Have a cholesterol test every 5 years, or more often if your doctor advises it.     Have a diabetes test (fasting glucose) every three years. If you are at risk for diabetes, you should have this test more often.     At age 65, have a bone density scan (DEXA) to check for osteoporosis (brittle bone disease).    Shots:  Get a flu shot each year.  Get a tetanus shot every 10 years.  Talk to your doctor about your pneumonia vaccines. There are now two you should receive - Pneumovax (PPSV 23) and Prevnar (PCV 13).  Talk to your doctor about the shingles vaccine.  Talk to your doctor about the hepatitis B vaccine.    Nutrition:   Eat at least 5 servings of fruits and vegetables each day.    Eat whole-grain bread, whole-wheat pasta and brown rice instead of white grains and rice.    Talk to your provider about Calcium and Vitamin D.     Lifestyle  Exercise at least 150 minutes a week (30 minutes a day, 5 days a week). This will help you  control your weight and prevent disease.    Limit alcohol to one drink per day.    No smoking.     Wear sunscreen to prevent skin cancer.     See your dentist twice a year for an exam and cleaning.    See your eye doctor every 1 to 2 years to screen for conditions such as glaucoma, macular degeneration and cataracts.

## 2017-07-01 DIAGNOSIS — I10 ESSENTIAL HYPERTENSION WITH GOAL BLOOD PRESSURE LESS THAN 140/90: ICD-10-CM

## 2017-07-03 RX ORDER — TRIAMTERENE AND HYDROCHLOROTHIAZIDE 37.5; 25 MG/1; MG/1
CAPSULE ORAL
Qty: 0.1 CAPSULE | Refills: 0 | OUTPATIENT
Start: 2017-07-03

## 2017-07-03 NOTE — TELEPHONE ENCOUNTER
Prescription sent 6/27/17.  Per pharmacy medication has already been picked up.  Denial sent, duplicate.  Chioma Vega RN

## 2017-07-19 ENCOUNTER — MYC MEDICAL ADVICE (OUTPATIENT)
Dept: FAMILY MEDICINE | Facility: CLINIC | Age: 67
End: 2017-07-19

## 2017-07-19 DIAGNOSIS — M54.16 LUMBAR RADICULOPATHY: Primary | ICD-10-CM

## 2017-07-25 ENCOUNTER — RADIANT APPOINTMENT (OUTPATIENT)
Dept: MRI IMAGING | Facility: CLINIC | Age: 67
End: 2017-07-25
Attending: FAMILY MEDICINE
Payer: MEDICARE

## 2017-07-25 DIAGNOSIS — M54.16 LUMBAR RADICULOPATHY: ICD-10-CM

## 2017-07-25 DIAGNOSIS — R93.7 ABNORMAL MAGNETIC RESONANCE IMAGING OF LUMBAR SPINE: Primary | ICD-10-CM

## 2017-07-25 PROCEDURE — 72148 MRI LUMBAR SPINE W/O DYE: CPT | Mod: TC

## 2017-07-27 ENCOUNTER — TELEPHONE (OUTPATIENT)
Dept: FAMILY MEDICINE | Facility: CLINIC | Age: 67
End: 2017-07-27

## 2017-07-27 NOTE — TELEPHONE ENCOUNTER
Spoke with patient and notified that the spine surgeon clinics options are [place] or [place].   Patient is fine with that and will move forward with the referral that she was given.     No further questions or concerns at this time.  Berenice Calvert RN   [facility]

## 2017-07-27 NOTE — TELEPHONE ENCOUNTER
Patient is calling because she has just recently gotten a call from the surgeons office that  recommended for her. Patient did not call back yet because she did a little bit of looking and it [place] like [place]. Patient wants to speak to  about a referral to someone who is closer to home. Please call patient to discuss and advise. Thank you

## 2017-07-28 ENCOUNTER — OFFICE VISIT (OUTPATIENT)
Dept: OPHTHALMOLOGY | Facility: CLINIC | Age: 67
End: 2017-07-28
Payer: MEDICARE

## 2017-07-28 DIAGNOSIS — H26.8 PSEUDOEXFOLIATION OF LENS CAPSULE: ICD-10-CM

## 2017-07-28 DIAGNOSIS — Z96.1 PSEUDOPHAKIA OF BOTH EYES: Primary | ICD-10-CM

## 2017-07-28 PROCEDURE — 99024 POSTOP FOLLOW-UP VISIT: CPT | Performed by: STUDENT IN AN ORGANIZED HEALTH CARE EDUCATION/TRAINING PROGRAM

## 2017-07-28 ASSESSMENT — VISUAL ACUITY
OD_SC: 20/40
METHOD: SNELLEN - LINEAR
OS_SC: 20/25+3

## 2017-07-28 ASSESSMENT — REFRACTION_MANIFEST
OS_CYLINDER: +0.75
OD_CYLINDER: +1.00
OD_ADD: +3.00
OD_SPHERE: -0.50
OS_SPHERE: -0.25
OS_AXIS: 120
OD_AXIS: 055
OS_ADD: +3.00

## 2017-07-28 ASSESSMENT — SLIT LAMP EXAM - LIDS
COMMENTS: NORMAL
COMMENTS: NORMAL

## 2017-07-28 ASSESSMENT — TONOMETRY
OS_IOP_MMHG: 16
OD_IOP_MMHG: 17
IOP_METHOD: APPLANATION

## 2017-07-28 ASSESSMENT — EXTERNAL EXAM - RIGHT EYE: OD_EXAM: NORMAL

## 2017-07-28 ASSESSMENT — CUP TO DISC RATIO
OS_RATIO: 0.2
OD_RATIO: 0.2

## 2017-07-28 ASSESSMENT — EXTERNAL EXAM - LEFT EYE: OS_EXAM: NORMAL

## 2017-07-28 NOTE — MR AVS SNAPSHOT
"              After Visit Summary   7/28/2017    Marisol Orta    MRN: [id number]           Patient Information     Date Of Birth          1950        Visit Information        Provider Department      7/28/2017 8:45 AM Caprice Jiménez MD [facility]        Today's Diagnoses     Pseudophakia of both eyes    -  1    Pseudoexfoliation of lens capsule, bilateral          Care Instructions    *  May use artificial tears up to 4 times per day (Refresh Plus, Systane Balance, or generic artificial tears are ok. Avoid \"get the red out\" drops).     *  Fill prescription for new glasses or use drugstore readers.    *  Return to clinic in 1 year for a complete eye exam or earlier if problems should arise.    Caprice Jiménez M.D.  [phone]          Follow-ups after your visit        Follow-up notes from your care team     Return in about 1 year (around 7/28/2018) for Complete Exam.      Your next 10 appointments already scheduled     Jul 31, 2017  8:30 AM CDT   DX HIP/PELVIS/SPINE with FKDX1   [facility] ([address] [address]   [phone]           Please do not take any of the following 24 hours prior to the day of your exam: vitamins, calcium tablets, antacids.  If possible, please wear clothes without metal (snaps, zippers). A sweatsuit works well.            Aug 01, 2017  9:00 AM CDT   REGGIE Spine with Derik Dean PT   [facility] [place] [facility] ([facility] [place])    [address]  [place] [address]   [phone]            Aug 08, 2017  9:00 AM CDT   REGGIE Spine with Derik Dean PT   [facility] ([facility])    [address]  [place] [address]   [phone]              Who to contact     If you have questions or need follow up information about today's clinic visit or your schedule please contact [facility] [facility] directly at [phone].  Normal or non-critical lab and imaging results will be communicated to you by MyChart, letter or phone within 4 business days after the clinic has received the results. If you do not hear from us within 7 days, please contact the clinic through MyChart or phone. If you have a critical or abnormal lab result, we will notify you by phone as soon as possible.  Submit refill requests through [facility] or call your pharmacy and they will forward the refill request to us. Please allow 3 business days for your refill to be completed.          Additional Information About Your Visit        Consumer [employer] Information     [facility] gives you secure access to your electronic health record. If you see a primary care provider, you can also send messages to your care team and make appointments. If you have questions, please call your primary care clinic.  If you do not have a primary care provider, please call [phone] and they will assist you.        Care EveryWhere ID     This is your Care EveryWhere ID. This could be used by other organizations to access your [place] medical records  [id number]         Blood Pressure from Last 3 Encounters:   06/27/17 131/79   06/20/17 117/64   06/06/17 116/66    Weight from Last 3 Encounters:   06/27/17 88.5 kg (195 lb)   06/01/17 88 kg (194 lb)   02/08/17 86.6 kg (191 lb)              Today, you had the following     No orders found for display       Primary Care Provider Office Phone # Fax #    Kizzy Juliana Thomason [phone]       [facility] [address] [address] [address]        Equal Access to Services     [facility] : Hadii aad ku hadasho Soomaali, waaxda luqadaha, qaybta kaalmada adegadiel, lori barahona. So [facility] [phone].    ATENCIÓN: Si habla español, tiene a turner disposición servicios gratuitos de asistencia lingüística. Llame al [phone].    We comply with applicable  federal civil rights laws and [place] laws. We do not discriminate on the basis of race, color, national origin, age, disability sex, sexual orientation or gender identity.            Thank you!     Thank you for choosing [facility]  for your care. Our goal is always to provide you with excellent care. Hearing back from our patients is one way we can continue to improve our services. Please take a few minutes to complete the written survey that you may receive in the mail after your visit with us. Thank you!             Your Updated Medication List - Protect others around you: Learn how to safely use, store and throw away your medicines at www.disposemymeds.org.          This list is accurate as of: 7/28/17  9:31 AM.  Always use your most recent med list.                   Brand Name Dispense Instructions for use Diagnosis    Bromfenac Sodium Powd     1 Bottle    1 Bottle 4 times daily 1 drop four times a day into the operative eye starting 1 day before surgery. Use until bottle runs out.    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 Use until bottle runs out.    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 Use until bottle runs out.    Combined form of age-related cataract, both eyes       MULTIVITAMIN GUMMIES ADULT PO           triamterene-hydrochlorothiazide 37.5-25 MG per capsule    DYAZIDE    90 capsule    Take 1 capsule by mouth every morning    Hypertension goal BP (blood pressure)  < 140/90

## 2017-07-28 NOTE — PATIENT INSTRUCTIONS
"*  May use artificial tears up to 4 times per day (Refresh Plus, Systane Balance, or generic artificial tears are ok. Avoid \"get the red out\" drops).     *  Fill prescription for new glasses or use drugstore readers.    *  Return to clinic in 1 year for a complete eye exam or earlier if problems should arise.    Caprice Jiménez M.D.  [phone]  "

## 2017-07-28 NOTE — PROGRESS NOTES
Current Eye Medications:  no     Subjective:  Final mr ou  Pt reports is seeing pretty well, a little blurry in right eye. This eye will also burns a little.     Objective:  See Ophthalmology Exam.       Assessment:  Marisol Orta is a 67 year old female who presents with:   Encounter Diagnoses   Name Primary?     Pseudophakia of both eyes Final MR, doing well     Pseudoexfoliation of lens capsule, bilateral Normotensive, normal appearing discs.       Plan:  *  Discontinue all drops, unless used prior to cataract surgery.  *  Fill prescription for new glasses or use [employer] readers.  *  Return to clinic in 1 year for a complete eye exam or earlier if problems should arise.    Caprice Jiménez M.D.  [phone]

## 2017-07-31 ENCOUNTER — RADIANT APPOINTMENT (OUTPATIENT)
Dept: BONE DENSITY | Facility: CLINIC | Age: 67
End: 2017-07-31
Attending: FAMILY MEDICINE
Payer: MEDICARE

## 2017-07-31 DIAGNOSIS — Z78.0 ASYMPTOMATIC MENOPAUSAL STATE: ICD-10-CM

## 2017-07-31 PROCEDURE — 77080 DXA BONE DENSITY AXIAL: CPT | Performed by: INTERNAL MEDICINE

## 2017-08-01 ENCOUNTER — THERAPY VISIT (OUTPATIENT)
Dept: PHYSICAL THERAPY | Facility: CLINIC | Age: 67
End: 2017-08-01
Payer: MEDICARE

## 2017-08-01 DIAGNOSIS — M54.42 ACUTE LEFT-SIDED LOW BACK PAIN WITH LEFT-SIDED SCIATICA: Primary | ICD-10-CM

## 2017-08-01 PROCEDURE — G8981 BODY POS CURRENT STATUS: HCPCS | Mod: GP | Performed by: PHYSICAL THERAPIST

## 2017-08-01 PROCEDURE — G8982 BODY POS GOAL STATUS: HCPCS | Mod: GP | Performed by: PHYSICAL THERAPIST

## 2017-08-01 PROCEDURE — 97161 PT EVAL LOW COMPLEX 20 MIN: CPT | Mod: GP | Performed by: PHYSICAL THERAPIST

## 2017-08-01 PROCEDURE — 97110 THERAPEUTIC EXERCISES: CPT | Mod: GP | Performed by: PHYSICAL THERAPIST

## 2017-08-01 NOTE — PROGRESS NOTES
[facility] Initial Evaluation    Subjective:    Patient is a 67 year old female presenting with rehab back hpi. The history is provided by the patient. No  was used.   Marisol Orta is a 67 year old female with a lumbar condition.  Condition occurred with:  Insidious onset.  Condition occurred: for unknown reasons.  This is a recurrent condition  First bout of back pain was 17 years ago.  Resolved with PT and epidural injections.  About 2 months ago had the same type of pain return in her low back and L LE.  Pt was referred to PT on 7/19/17.    Patient reports pain:  Lumbar spine left.  Radiates to:  Foot left (numbness in lateral L thigh).  Pain is described as sharp and is intermittent and reported as 3/10.  Associated symptoms:  Tingling and loss of motion/stiffness. Pain is worse in the A.M. and worse in the P.M..  Symptoms are exacerbated by sitting and relieved by activity/movement and NSAID's (stretching).  Since onset symptoms are unchanged.  Special tests:  MRI.      General health as reported by patient is good.  Pertinent medical history includes:  Overweight and high blood pressure.  Medical allergies: no.  Other surgeries include:  Orthopedic surgery (toe).  Current medications:  High blood pressure medication.  Current occupation is Retired, cares for 3 y/o grandson.        Barriers include:  None as reported by the patient.    Red flags:  None as reported by the patient.                        Objective:    System         Lumbar/SI Evaluation    Lumbar Myotomes:  normal                Lumbar Dermtomes:  normal                                                                         Amanda Lumbar Evaluation    Posture:  Sitting: fair  Standing: good  Lordosis: WNL  Lateral Shift: no      Movement Loss:  Flexion (Flex): min and pain  Extension (EXT): min and pain      Test Movements:  FIS: During: increases  After: no worse    Repeat FIS: During: increases   After: worse        EIL: During: decreases  After: better    Repeat EIL: During: decreases  After: better    SGIS R: During: decreases  After: no effect    Repeat SGIS R: During: decreases  After: no better    SGIS L: During: decreases  After: no effect    Repeat SGIS L: During: decreases  After: no better        Principle of Treatment:      Extension: REIL                                           ROS    Assessment/Plan:      Patient is a 67 year old female with lumbar complaints.    Patient has the following significant findings with corresponding treatment plan.                Diagnosis 1:  LBP  Pain -  manual therapy, self management, education and home program  Decreased ROM/flexibility - manual therapy, therapeutic exercise and home program  Decreased function - therapeutic activities and home program    Therapy Evaluation Codes:   1) History comprised of:   Personal factors that impact the plan of care:      None.    Comorbidity factors that impact the plan of care are:      Numbness/tingling.     Medications impacting care: None.  2) Examination of Body Systems comprised of:   Body structures and functions that impact the plan of care:      Lumbar spine.   Activity limitations that impact the plan of care are:      Bending and Sitting.  3) Clinical presentation characteristics are:   Stable/Uncomplicated.  4) Decision-Making    Low complexity using standardized patient assessment instrument and/or measureable assessment of functional outcome.  Cumulative Therapy Evaluation is: Low complexity.    Previous and current functional limitations:  (See Goal Flow Sheet for this information)    Short term and Long term goals: (See Goal Flow Sheet for this information)     Communication ability:  Patient appears to be able to clearly communicate and understand verbal and written communication and follow directions correctly.  Treatment Explanation - The following has been discussed with the patient:   RX ordered/plan  of care  Anticipated outcomes  Possible risks and side effects  This patient would benefit from PT intervention to resume normal activities.   Rehab potential is good.    Frequency:  1 X week, once daily  Duration:  for 4 weeks  Discharge Plan:  Achieve all LTG.  Independent in home treatment program.  Reach maximal therapeutic benefit.    Please refer to the daily flowsheet for treatment today, total treatment time and time spent performing 1:1 timed codes.

## 2017-08-01 NOTE — LETTER
DEPARTMENT OF HEALTH AND HUMAN SERVICES  CENTERS FOR MEDICARE & MEDICAID SERVICES    PLAN/UPDATED PLAN OF PROGRESS FOR OUTPATIENT REHABILITATION    PATIENTS NAME:  Marisol Orta   : 1950  PROVIDER NUMBER:   [id number]  [id number]  PROVIDER NAME: [facility]  MEDICAL RECORD NUMBER: [id number]   START OF CARE DATE:  SOC Date: 17   TYPE:  PT    PRIMARY/TREATMENT DIAGNOSIS: (Pertinent Medical Diagnosis)  Acute left-sided low back pain with left-sided sciatica  VISITS FROM START OF CARE:  Rxs Used: 1     [facility] Initial Evaluation  Subjective:  Patient is a 67 year old female presenting with rehab back hpi. The history is provided by the patient. No  was used. Marisol Orta is a 67 year old female with a lumbar condition.  Condition occurred with:  Insidious onset.  Condition occurred: for unknown reasons.  This is a recurrent condition  First bout of back pain was 17 years ago.  Resolved with PT and epidural injections.  About 2 months ago had the same type of pain return in her low back and L LE.  Pt was referred to PT on 17.  Patient reports pain:  Lumbar spine left.  Radiates to:  Foot left (numbness in lateral L thigh).  Pain is described as sharp and is intermittent and reported as 3/10.  Associated symptoms:  Tingling and loss of motion/stiffness. Pain is worse in the A.M. and worse in the P.M..  Symptoms are exacerbated by sitting and relieved by activity/movement and NSAID's (stretching).  Since onset symptoms are unchanged.  Special tests:  MRI.  General health as reported by patient is good.  Pertinent medical history includes:  Overweight and high blood pressure.  Medical allergies: no.  Other surgeries include:  Orthopedic surgery (toe).  Current medications:  High blood pressure medication.  Current occupation is Retired, cares for 3 y/o grandson.      Barriers include:  None as reported by the patient.  Red  flags:  None as reported by the patient.              Objective:  Lumbar/SI Evaluation  Lumbar Myotomes:  normal  Lumbar Dermtomes:  Normal  Amanda Lumbar Evaluation  Posture:  Sitting: fair  Standing: good  Lordosis: WNL  Lateral Shift: no  Movement Loss:  Flexion (Flex): min and pain  Extension (EXT): min and pain  Test Movements:  FIS: During: increases  After: no worse    Repeat FIS: During: increases  After: worse    EIL: During: decreases  After: better    PATIENTS NAME:  Marisol Orta   : 1950  Repeat EIL: During: decreases  After: better    SGIS R: During: decreases  After: no effect    Repeat SGIS R: During: decreases  After: no better    SGIS L: During: decreases  After: no effect    Repeat SGIS L: During: decreases  After: no better    Principle of Treatment:  Extension: REIL    Assessment/Plan:    Patient is a 67 year old female with lumbar complaints.    Patient has the following significant findings with corresponding treatment plan.                Diagnosis 1:  LBP  Pain -  manual therapy, self management, education and home program  Decreased ROM/flexibility - manual therapy, therapeutic exercise and home program  Decreased function - therapeutic activities and home program    Therapy Evaluation Codes:   1) History comprised of:   Personal factors that impact the plan of care:      None.    Comorbidity factors that impact the plan of care are:      Numbness/tingling.     Medications impacting care: None.  2) Examination of Body Systems comprised of:   Body structures and functions that impact the plan of care:      Lumbar spine.   Activity limitations that impact the plan of care are:      Bending and Sitting.  3) Clinical presentation characteristics are:   Stable/Uncomplicated.  4) Decision-Making    Low complexity using standardized patient assessment instrument and/or measureable assessment of functional outcome.  Cumulative Therapy Evaluation is: Low complexity.  Previous and current  "functional limitations:  (See Goal Flow Sheet for this information)    Short term and Long term goals: (See Goal Flow Sheet for this information)   Communication ability:  Patient appears to be able to clearly communicate and understand verbal and written communication and follow directions correctly.  Treatment Explanation - The following has been discussed with the patient:   RX ordered/plan of care  Anticipated outcomes  Possible risks and side effects  This patient would benefit from PT intervention to resume normal activities.   Rehab potential is good.  Frequency:  1 X week, once daily  Duration:  for 4 weeks  Discharge Plan:  Achieve all LTG.  Independent in home treatment program.  Reach maximal therapeutic benefit.  Please refer to the daily flowsheet for treatment today, total treatment time and time spent performing 1:1 timed codes.   PATIENTS NAME:  Marisol Orta   : 1950            Caregiver Signature/Credentials _____________________________ Date ________      Treating Provider: Derik Dean, PT   I have reviewed and certified the need for these services and plan of treatment while under my care.        PHYSICIAN'S SIGNATURE:   _________________________________________  Date___________   Kizzy Thomason    Certification period:  Beginning of Cert date period: 17 to  End of Cert period date: 10/29/17     Functional Level Progress Report: Please see attached \"Goal Flow sheet for Functional level.\"    ____X____ Continue Services or       ________ DC Services                Service dates: From  SOC Date: 17 date to present                         "

## 2017-08-01 NOTE — MR AVS SNAPSHOT
After Visit Summary   8/1/2017    Marisol Orta    MRN: [id number]           Patient Information     Date Of Birth          1950        Visit Information        Provider Department      8/1/2017 9:00 AM Derik Dean, PT [facility] [place] [facility]        Today's Diagnoses     Acute left-sided low back pain with left-sided sciatica    -  1       Follow-ups after your visit        Your next 10 appointments already scheduled     Aug 08, 2017  9:00 AM CDT   REGGIE Spine with Derik Dean PT   [facility] [place] [facility] ([facility] [place])    [address]  [place] [address]   [phone]            Aug 15, 2017 12:40 PM CDT   REGGIE Spine with Dominik Garcia PT   [facility] [place] [facility] ([facility] [place])    [address]  [place] [address]   [phone]            Aug 25, 2017 10:30 AM CDT   (Arrive by 10:15 AM)   New Patient Visit with Cheyanne Millard PA-C   [facility] Neurosurgery ([facility] [place])    [address] [place] [address] [phone]              Who to contact     If you have questions or need follow up information about today's clinic visit or your schedule please contact [facility] directly at [phone].  Normal or non-critical lab and imaging results will be communicated to you by MyChart, letter or phone within 4 business days after the clinic has received the results. If you do not hear from us within 7 days, please contact the clinic through MyChart or phone. If you have a critical or abnormal lab result, we will notify you by phone as soon as possible.  Submit refill requests through [employer] or call your pharmacy and they will forward the refill request to us. Please allow 3 business days for your refill to be completed.          Additional Information About Your Visit        MyChart  Information     [employer] gives you secure access to your electronic health record. If you see a primary care provider, you can also send messages to your care team and make appointments. If you have questions, please call your primary care clinic.  If you do not have a primary care provider, please call [phone] and they will assist you.        Care EveryWhere ID     This is your Care EveryWhere ID. This could be used by other organizations to access your [facility] medical records  [id number]         Blood Pressure from Last 3 Encounters:   06/27/17 131/79   06/20/17 117/64   06/06/17 116/66    Weight from Last 3 Encounters:   06/27/17 88.5 kg (195 lb)   06/01/17 88 kg (194 lb)   02/08/17 86.6 kg (191 lb)              We Performed the Following     HC PT EVAL, LOW COMPLEXITY     REGGIE CERT REPORT     THERAPEUTIC EXERCISES        Primary Care Provider Office Phone # Fax #    Kizzy Juliana Thomason [phone]       [facility] [address] [place] [address]        Equal Access to Services     LOCO NUGENT : Hadii aad ku hadasho Soomaali, waaxda luqadaha, qaybta kaalmada adeegyada, lori harp . So [facility] [phone].    ATENCIÓN: Si habla español, tiene a turner disposición servicios gratuitos de asistencia lingüística. [facility] [phone].    We comply with applicable federal civil rights laws and [place] laws. We do not discriminate on the basis of race, color, national origin, age, disability sex, sexual orientation or gender identity.            Thank you!     Thank you for choosing [facility]  for your care. Our goal is always to provide you with excellent care. Hearing back from our patients is one way we can continue to improve our services. Please take a few minutes to complete the written survey that you may receive in the mail after your visit with us. Thank you!             Your Updated Medication List - Protect  others around you: Learn how to safely use, store and throw away your medicines at www.disposemymeds.org.          This list is accurate as of: 8/1/17 12:37 PM.  Always use your most recent med list.                   Brand Name Dispense Instructions for use Diagnosis    Bromfenac Sodium Powd     1 Bottle    1 Bottle 4 times daily 1 drop four times a day into the operative eye starting 1 day before surgery. Use until bottle runs out.    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 Use until bottle runs out.    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 Use until bottle runs out.    Combined form of age-related cataract, both eyes       MULTIVITAMIN GUMMIES ADULT PO           triamterene-hydrochlorothiazide 37.5-25 MG per capsule    DYAZIDE    90 capsule    Take 1 capsule by mouth every morning    Hypertension goal BP (blood pressure) < 140/90

## 2017-08-08 ENCOUNTER — THERAPY VISIT (OUTPATIENT)
Dept: PHYSICAL THERAPY | Facility: CLINIC | Age: 67
End: 2017-08-08
Payer: MEDICARE

## 2017-08-08 DIAGNOSIS — M54.42 ACUTE LEFT-SIDED LOW BACK PAIN WITH LEFT-SIDED SCIATICA: ICD-10-CM

## 2017-08-08 PROCEDURE — 97110 THERAPEUTIC EXERCISES: CPT | Mod: GP | Performed by: PHYSICAL THERAPIST

## 2017-08-08 PROCEDURE — 97112 NEUROMUSCULAR REEDUCATION: CPT | Mod: GP | Performed by: PHYSICAL THERAPIST

## 2017-08-08 NOTE — MR AVS SNAPSHOT
After Visit Summary   8/8/2017    Marisol Orta    MRN: [id number]           Patient Information     Date Of Birth          1950        Visit Information        Provider Department      8/8/2017 9:00 AM Derik Dean PT [facility] [place] [facility]        Today's Diagnoses     Acute left-sided low back pain with left-sided sciatica           Follow-ups after your visit        Your next 10 appointments already scheduled     Aug 15, 2017 12:40 PM CDT   REGGIE Spine with Dominik Garcia PT   [facility] [place] [facility] ([facility] [place])    [address]  [address]  [place] [address]   [phone]            Aug 25, 2017 10:30 AM CDT   (Arrive by 10:15 AM)   New Patient Visit with Cheyanne Millard PA-C   [facility] ([facility] [place])    [address]  3rd [address]  [facility] [id number] [phone]              Who to contact     If you have questions or need follow up information about today's clinic visit or your schedule please contact [facility] directly at [phone].  Normal or non-critical lab and imaging results will be communicated to you by MyChart, letter or phone within 4 business days after the clinic has received the results. If you do not hear from us within 7 days, please contact the clinic through MyChart or phone. If you have a critical or abnormal lab result, we will notify you by phone as soon as possible.  Submit refill requests through [employer] or call your pharmacy and they will forward the refill request to us. Please allow 3 business days for your refill to be completed.          Additional Information About Your Visit        MyChart Information     [employer] gives you secure access to your electronic health record. If you see a primary care provider, you can also send messages to your care team and make appointments. If you have questions, please call your  primary care clinic.  If you do not have a primary care provider, please call [phone] and they will assist you.        Care EveryWhere ID     This is your Care EveryWhere ID. This could be used by other organizations to access your [place] medical records  [id number]         Blood Pressure from Last 3 Encounters:   06/27/17 131/79   06/20/17 117/64   06/06/17 116/66    Weight from Last 3 Encounters:   06/27/17 88.5 kg (195 lb)   06/01/17 88 kg (194 lb)   02/08/17 86.6 kg (191 lb)              We Performed the Following     NEUROMUSCULAR RE-EDUCATION     THERAPEUTIC EXERCISES        Primary Care Provider Office Phone # Fax #    Kizzy Thomason [phone]       [facility] [address] [address] [address]        Equal Access to Services     MATILDE NUGENT : Hadii aad ku hadasho Soomaali, waaxda luqadaha, qaybta kaalmada adeegyada, lori carpenterin hayaan arie harp . So [facility] [phone].    ATENCIÓN: Si habla español, tiene a turner disposición servicios gratuitos de asistencia lingüística. Llame al [phone].    We comply with applicable federal civil rights laws and [place] laws. We do not discriminate on the basis of race, color, national origin, age, disability sex, sexual orientation or gender identity.            Thank you!     Thank you for choosing [facility]  for your care. Our goal is always to provide you with excellent care. Hearing back from our patients is one way we can continue to improve our services. Please take a few minutes to complete the written survey that you may receive in the mail after your visit with us. Thank you!             Your Updated Medication List - Protect others around you: Learn how to safely use, store and throw away your medicines at www.disposemymeds.org.          This list is accurate as of: 8/8/17 11:21 AM.  Always use your most recent med list.                   Brand Name Dispense  Instructions for use Diagnosis    Bromfenac Sodium Powd     1 Bottle    1 Bottle 4 times daily 1 drop four times a day into the operative eye starting 1 day before surgery. Use until bottle runs out.    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 Use until bottle runs out.    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 Use until bottle runs out.    Combined form of age-related cataract, both eyes       MULTIVITAMIN GUMMIES ADULT PO           triamterene-hydrochlorothiazide 37.5-25 MG per capsule    DYAZIDE    90 capsule    Take 1 capsule by mouth every morning    Hypertension goal BP (blood pressure) < 140/90

## 2017-08-08 NOTE — PROGRESS NOTES
Subjective:    HPI                    Objective:    System    Physical Exam    General     ROS    Assessment/Plan:      SUBJECTIVE  Subjective: Can't say there has been any change in the last week.  Sitting, especially in the car is still the worst, better off when she is walking around   Current Pain level: 3/10   Changes in function:  None     Adverse reaction to treatment or activity:  None    OBJECTIVE  Objective: Initially pt was not performing REIL through full ROM and achieving an end range.  Once corrected her LB and L post thigh pain decreased.  Trialed self OP and LE pain worsened so we held on HEP for now     ASSESSMENT  Marisol continues to require intervention to meet STG and LTG's: PT  Pain lessened again with correct technique of REIL.  So only temporary relief so far  Response to therapy has shown an improvement in  pain level  Progress made towards STG/LTG?  None    PLAN  Continue current treatment plan until patient demonstrates readiness to progress to higher level exercises.  Follow up with Shefali Garcia DPT for further evaluation of directional preference program    PTA/ATC plan:  N/A    Please refer to the daily flowsheet for treatment today, total treatment time and time spent performing 1:1 timed codes.

## 2017-08-12 ASSESSMENT — ENCOUNTER SYMPTOMS
SPEECH CHANGE: 0
MUSCLE WEAKNESS: 0
PARALYSIS: 0
JOINT SWELLING: 0
MEMORY LOSS: 0
NUMBNESS: 1
DISTURBANCES IN COORDINATION: 0
ARTHRALGIAS: 0
TINGLING: 1
SEIZURES: 0
DIZZINESS: 0
TREMORS: 0
BACK PAIN: 1
LOSS OF CONSCIOUSNESS: 0
STIFFNESS: 0
MUSCLE CRAMPS: 0
HEADACHES: 0
NECK PAIN: 1
MYALGIAS: 1
WEAKNESS: 0

## 2017-08-15 ENCOUNTER — THERAPY VISIT (OUTPATIENT)
Dept: PHYSICAL THERAPY | Facility: CLINIC | Age: 67
End: 2017-08-15
Payer: MEDICARE

## 2017-08-15 DIAGNOSIS — M54.42 ACUTE LEFT-SIDED LOW BACK PAIN WITH LEFT-SIDED SCIATICA: ICD-10-CM

## 2017-08-15 PROCEDURE — 97112 NEUROMUSCULAR REEDUCATION: CPT | Mod: GP | Performed by: PHYSICAL THERAPIST

## 2017-08-15 PROCEDURE — 97530 THERAPEUTIC ACTIVITIES: CPT | Mod: GP | Performed by: PHYSICAL THERAPIST

## 2017-08-15 PROCEDURE — 97110 THERAPEUTIC EXERCISES: CPT | Mod: GP | Performed by: PHYSICAL THERAPIST

## 2017-08-15 NOTE — MR AVS SNAPSHOT
After Visit Summary   8/15/2017    Marisol Orta    MRN: [id number]           Patient Information     Date Of Birth          1950        Visit Information        Provider Department      8/15/2017 12:40 PM Dominik Garcia, PT [facility]        Today's Diagnoses     Acute left-sided low back pain with left-sided sciatica           Follow-ups after your visit        Your next 10 appointments already scheduled     Aug 25, 2017 10:30 AM CDT   (Arrive by 10:15 AM)   New Patient Visit with Cheyanne Millard PA-C   [facility] ([facility])    [address]  3rd Floor  [place] [address]   [phone]            Aug 29, 2017  9:00 AM CDT   REGGIE Spine with Derik Dean PT   [facility] ([facility])    [address]  [address]  [place] [id number] [phone]              Who to contact     If you have questions or need follow up information about today's clinic visit or your schedule please contact [facility] directly at [phone].  Normal or non-critical lab and imaging results will be communicated to you by MyChart, letter or phone within 4 business days after the clinic has received the results. If you do not hear from us within 7 days, please contact the clinic through MyChart or phone. If you have a critical or abnormal lab result, we will notify you by phone as soon as possible.  Submit refill requests through BookNow or call your pharmacy and they will forward the refill request to us. Please allow 3 business days for your refill to be completed.          Additional Information About Your Visit        MyChart Information     BookNow gives you secure access to your electronic health record. If you see a primary care provider, you can also send messages to your care team and make appointments. If you have questions, please call your  primary care clinic.  If you do not have a primary care provider, please call [phone] and they will assist you.        Care EveryWhere ID     This is your Care EveryWhere ID. This could be used by other organizations to access your [place] medical records  [id number]         Blood Pressure from Last 3 Encounters:   06/27/17 131/79   06/20/17 117/64   06/06/17 116/66    Weight from Last 3 Encounters:   06/27/17 88.5 kg (195 lb)   06/01/17 88 kg (194 lb)   02/08/17 86.6 kg (191 lb)              We Performed the Following     NEUROMUSCULAR RE-EDUCATION     THERAPEUTIC ACTIVITIES     THERAPEUTIC EXERCISES        Primary Care Provider Office Phone # Fax #    Kizzy Thomason [phone] [address] [facility] [address]        Equal Access to Services     LOCO NUGENT : Hadii aad ku hadasho Soomaali, waaxda luqadaha, qaybta kaalmada adeegyada, waxay jaydenin hayaan arie harp . So [facility] [phone].    ATENCIÓN: Si habla español, tiene a turner disposición servicios gratuitos de asistencia lingüística. [facility] [phone].    We comply with applicable federal civil rights laws and [place] laws. We do not discriminate on the basis of race, color, national origin, age, disability sex, sexual orientation or gender identity.            Thank you!     Thank you for choosing [facility]   for your care. Our goal is always to provide you with excellent care. Hearing back from our patients is one way we can continue to improve our services. Please take a few minutes to complete the written survey that you may receive in the mail after your visit with us. Thank you!             Your Updated Medication List - Protect others around you: Learn how to safely use, store and throw away your medicines at www.disposemymeds.org.          This list is accurate as of: 8/15/17  2:35 PM.  Always use your most recent med list.                   Brand Name Dispense  Instructions for use Diagnosis    Bromfenac Sodium Powd     1 Bottle    1 Bottle 4 times daily 1 drop four times a day into the operative eye starting 1 day before surgery. Use until bottle runs out.    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 Use until bottle runs out.    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 Use until bottle runs out.    Combined form of age-related cataract, both eyes       MULTIVITAMIN GUMMIES ADULT PO           triamterene-hydrochlorothiazide 37.5-25 MG per capsule    DYAZIDE    90 capsule    Take 1 capsule by mouth every morning    Hypertension goal BP (blood pressure) < 140/90

## 2017-08-15 NOTE — PROGRESS NOTES
Subjective:    HPI                    Objective:    System    Physical Exam    General     ROS    Assessment/Plan:      SUBJECTIVE  Subjective changes as noted by pt: Patient reports that she feels better. She is unsure if it is due to the exercise or because she took a 4 mile walk yesterday. She has been doing the exercises 30 reps every 2 hours. Sitting is still a problem - she reports that sitting with a roll in the car hurts more than helps. To drive, she is having to lay back in the seat. Mornings are the worst time. Pain goes into the leg on a daily basis of/on. She has more numbness/tingling in the foot over the last 2-3 days. No pain currently.   Current pain level: 0/10 (numbness in L 3rd toe)   Changes in function:  None     Adverse reaction to treatment or activity:  None    OBJECTIVE  Changes in objective findings: Lumbar AROM flexion to toes, extension mod loss with ERP; SG R mod loss, L min loss with pain on return to neutral. Lumbar extension improved with all extension movements. However, patient reported increased toe numbness after performing REIL with self OP as she had been at home (incorrect form - pressing back down with arms bent and doing 30 reps every 2 hours). Corrected form and reviewed/discussed in detail reasoning for exercises, self-monitoring, phases of recovery.     ASSESSMENT  Marisol continues to require intervention to meet STG and LTG's: PT  Patient is not progressing as expected.  Response to therapy has shown lack of progress in radicular symptoms, correct compliance with hep  Progress made towards STG/LTG?  None    PLAN  Continue current treatment plan until patient demonstrates readiness to progress to higher level exercises.    PTA/ATC plan:  N/A    Please refer to the daily flowsheet for treatment today, total treatment time and time spent performing 1:1 timed codes.

## 2017-08-25 ENCOUNTER — OFFICE VISIT (OUTPATIENT)
Dept: NEUROSURGERY | Facility: CLINIC | Age: 67
End: 2017-08-25

## 2017-08-25 VITALS
HEIGHT: 63 IN | BODY MASS INDEX: 34.55 KG/M2 | WEIGHT: 195 LBS | DIASTOLIC BLOOD PRESSURE: 66 MMHG | HEART RATE: 72 BPM | SYSTOLIC BLOOD PRESSURE: 146 MMHG

## 2017-08-25 DIAGNOSIS — M54.16 LEFT LUMBAR RADICULOPATHY: Primary | ICD-10-CM

## 2017-08-25 RX ORDER — METHYLPREDNISOLONE 4 MG
TABLET, DOSE PACK ORAL
Qty: 21 TABLET | Refills: 0 | Status: SHIPPED | OUTPATIENT
Start: 2017-08-25 | End: 2018-01-10

## 2017-08-25 ASSESSMENT — PAIN SCALES - GENERAL: PAINLEVEL: NO PAIN (0)

## 2017-08-25 NOTE — PROGRESS NOTES
"  Neurosurgery Clinic Consult  Date of Visit: 8/25/2017  Referred for: Left leg pain and  Referring Provider: Kizzy Thomason      Dear Dr. Thomason    We were happy to see Marisol Orta , a pleasant 67 year old year old right handed female for left leg pain and numbness    HPI: As you may recall this nice lady has chronic stable LBP that has been present on and off for years. A number of years ago she was told she had a problem at the L4 5 lumbar level, she had been treated with physical therapy which failed to provide much relief and so she underwent a \"shot in the back which also did not help much. In fact she had a short period of time increased leg pain, a repeat injection was performed within a day or 2, and then her leg pain faded and she did very well after that.    She flared again in July 2016 and with nonoperative management it resolved. In June of this year she started developing left leg numbness along the lateral thigh and a return of her back pain which begins in the left very low back, essentially the mid buttock and shoots down the back of the left leg to the heel.  She was getting ready to have eye surgery at the time so had stopped doing her back exercises. She thinks that is what made it flareup. She mentioned this to her primary care started her in physical therapy, she is about 95% improved now from what she was then. MRI was also performed and based on the findings she was referred for neurosurgical opinion and treatment recommendations.    Her current symptoms (pain, numbness and weakness), a detailed description of alleviating or exacerbating factors, and pain scores are outlined below.  Patient Supplied Answers To the [facility] Pain Questionnaire  [facility] Pain -  Patient Entered Questionnaire/Answers 8/12/2017   What number best describes your pain right now:  0 = No pain  to  10 = Worst pain imaginable 4   How would you describe the pain? sharp, numbness   Which of the following worsen your pain? " sitting   Which of the following improve or reduce your pain?  lying down, standing, walking   What number best describes your average pain for the past week:  0 = No pain  to  10 = Worst pain imaginable 4   What number best describes your LOWEST pain in past 24 hours:  0 = No pain  to  10 = Worst pain imaginable 1   What number best describes your WORST pain in past 24 hours:  0 = No pain  to  10 = Worst pain imaginable 5   When is your pain worst? AM   What non-medicine treatments have you already had for your pain? physical therapy, exercise   Have you tried treating your pain with medication?  Yes   Are you currently taking medications for your pain? No     *  PAIN:  Left S1 distribution  Worse with:  Sitting    5/10       Better with:   Walking and exercise. 1/10   *  NUMBNESS: left lateral thigh, does not go to the calf but does go to the third toe of the left foot.              QUALITY:    Dead feeling .    *  WEAKNESS:   none  *  BOWEL/BLADDER FUNCTION:  Intact.    *  OTHER SYMPTOMS:  No  She presents for evaluation, and treatment recommendations with the understanding that we are a surgical team.    Current Outpatient Prescriptions:      methylPREDNISolone (MEDROL DOSEPAK) 4 MG tablet, Follow package instructions, Disp: 21 tablet, Rfl: 0     triamterene-hydrochlorothiazide (DYAZIDE) 37.5-25 MG per capsule, Take 1 capsule by mouth every morning, Disp: 90 capsule, Rfl: 1     lisinopril (PRINIVIL/ZESTRIL) 2.5 MG tablet, TAKE 1 TABLET (2.5 MG) BY MOUTH DAILY, Disp: 90 tablet, Rfl: 1     Multiple Vitamins-Minerals (MULTIVITAMIN GUMMIES ADULT PO), , Disp: , Rfl:      CALCIUM 600+D PO, 2 tablet daily, Disp: , Rfl:     Allergies   Allergen Reactions     Nkda [No Known Drug Allergies]        Past Medical History:   Diagnosis Date     Arthritis     osteoarthritis     Back pain      HTN (hypertension)      Hyperlipidemia LDL goal < 130      Nonsenile cataract        Past Surgical History:   Procedure Laterality Date      APPENDECTOMY       CATARACT IOL, RT/LT       COLONOSCOPY  2012?     FOOT SURGERY      left foot 1st  fusion     ORTHOPEDIC SURGERY       PHACOEMULSIFICATION CLEAR CORNEA WITH STANDARD INTRAOCULAR LENS IMPLANT Left 6/6/2017    Procedure: PHACOEMULSIFICATION CLEAR CORNEA WITH STANDARD INTRAOCULAR LENS IMPLANT;  LEFT EYE PHACOEMULSIFICATION CLEAR CORNEA WITH STANDARD INTRAOCULAR LENS IMPLANT ;  Surgeon: Caprice Jiménez MD;  [place] [facility] [place]     PHACOEMULSIFICATION CLEAR CORNEA WITH STANDARD INTRAOCULAR LENS IMPLANT Right 6/20/2017    Procedure: PHACOEMULSIFICATION CLEAR CORNEA WITH STANDARD INTRAOCULAR LENS IMPLANT;  RIGHT EYE PHACOEMULSIFICATION CLEAR CORNEA WITH STANDARD INTRAOCULAR LENS IMPLANT;  Surgeon: Caprice Jiménez MD;  [place] [facility] [place]       Family History   Problem Relation Age of Onset     Lipids Mother      Cancer - colorectal Mother 70     Hypertension Mother      Arthritis Mother      CANCER Mother 84     lung cancer      Thyroid Disease Mother      Colon Cancer Mother      Other Cancer Mother      Arthritis Father      Hypertension Father      Macular Degeneration Father 85     monitors visison with [employer] grid, more trouble reading     Depression Father      Anxiety Disorder Father      DIABETES Maternal Grandmother      DIABETES Paternal Grandmother      Asthma Brother      Depression Brother      Anxiety Disorder Brother      Thyroid Disease Brother      Hypertension Sister      Thyroid Disease Sister      Eye Disorder Son 25     keratoconus?     Glaucoma Daughter 18     montoring pressure     Glaucoma Paternal Aunt      DIABETES Paternal Aunt      CEREBROVASCULAR DISEASE No family hx of        Social History     Social History     Marital status:      Spouse name: N/A     Number of children: N/A     Years of education: N/A     Occupational History     Not on file.     Social History Main Topics     Smoking status: Never Smoker     Smokeless tobacco: Never Used      Comment: smoke  "free household     Alcohol use No     Drug use: No     Sexual activity: Yes     Partners: Male     Birth control/ protection: Post-menopausal     Other Topics Concern     Parent/Sibling W/ Cabg, Mi Or Angioplasty Before 65f 55m? No     Social History Narrative     Problem list and 13 point review of systems: is reviewed in Epic and is negative with the exception of those symptoms associated with HPI and PMH.      OBJECTIVE:   /66  Pulse 72  Ht 1.6 m (5' 3\")  Wt 88.5 kg (195 lb)  BMI 34.54 kg/m2    Imaging:  These are the pertinent radiologist's findings from:  MRI  Lumbar WO 7/25/17 @ [facility] .   1. At L4-L5 there are degenerative changes and a moderate-sized  broad-based left central disc protrusion with moderate to severe  central canal and mild bilateral neural foraminal stenosis.  2. Degenerative changes elsewhere with no other disc herniation seen.  There is moderate central canal stenosis at L3-L4 with mild central  canal stenosis at L2-L3.    See the full report in EPIC/Media tab.  I personally reviewed the images with the patient.      Exam:  Pertinent positives:      Benign exam  *   Well developed, well nourished female found seated comfortably in exam room chair.  She is able to sit and rise independently.  She is accompanied by male spouse.    *  Mental Status  A&O X3.  Bright, alert, affable, interactive. Language fluid, fund of knowledge intact. Good historian.  Mood and affect congruent and WNL.  *  Cranial Nerves  II: Able to read printed forms, VF full to gross confrontation.  III: IV, VI:  PERRLA, EOMI, No nystagmus, no ptosis.  V: Sensation intact in bilateral V1, V2, and V3. Jaw clench symmetric.    VII:  Intact to voice bilaterally.  IX:  Pushes tongue against bilateral cheeks.  X:  Palate elevates, uvula midline, phonation intact.  XI: Elevates shoulders, head turn intact.  XII: Tongue midline. No fasciculations..  *  Cervical Spine:  DTR's at Biceps, Triceps, and Brachioradialis 2/4 and " symmetric.  Sensation intact.    No Florian's. No Phalen's.  No Tinel's. No fasciculations.   Muscle bulk and tone WNL.  Upper Extremity Strength.              RIGHT                LEFT     Deltoid              5/5                   5/5       Biceps              5/5                   5/5        Triceps              5/5                   5/5       Wrist Extensor              5/5                   5/5                     5/5                    5/5       Interossei              5/5                   5/5       EPL              5/5                    5/5       Pinch              5/5                  5/5          *  Lumbar Spine:     DTR's Patellar and Achilles 1/4 and symmetric.   No fasciculations.  Muscle bulk and tone WNL.  No Clonus.  Sensation intact.    Lower Extremity Strength                   RIGHT                   LEFT     Iliopsoas                    5/5                      5/5       Quad                    5/5                        5/5       Hamstring                      5/5                        5/5         Gastrocs                    5/5                        5/5       Tib. Anterior                     5/5                        5/5       EHL                      5/5                        5/5       Babinski                 Down                                      Down                   *  Structural Exam  Inspection of the spine reveals no scoliosis, exaggerated kyphosis or lordosis.  Head is balanced over the pelvis in the coronal and sagittal plane.    Lumbar ROM full.  Able to doff and don socks without difficulty. No spasming, no tenderness, no step offs. No SLR.  No SI tenderness. No trochanteric bursal tenderness. Negative Diaz's. Negative Warren's.  Feet are warm, pink.  Gait narrow, smooth, no scissoring.     ASSESSMENT:  1. Left lumbar radiculopathy    Central lumbar spinal stenosis at L4 5.  Left foraminal stenosis at L5-S1    PLAN:  I believe both levels may be contributing. However  she is made great progress with physical therapy alone and is nearly symptom free at this time.   Therefore we'll try to consolidate these gains with a Medrol Dosepak, encourage her to complete physical therapy, and start on home exercise program.  She can either do HEP prescribed by PT, or yoga or Pilate's.   I will see her back here in 4 weeks to evaluate her progress.    I did a bit of education regarding the natural history of spinal stenosis. Using a model and her own images we discussed that it is a progressive disorder, although it can be managed nicely with nonoperative therapy for long periods of time it often comes to surgery.   I answered all of their questions, which indicated good understanding of the situation.  They are willing to move forward with the recommendations. I supplied them with business cards and telephone numbers and urged them to call should they have questions, comments, or concerns. It's been a pleasure participating in the care of this nice patient. We thank you for your confidence in the [facility], Department of Neurosurgery.      Best Regards,    Cheyanne Millard PA-C  [facility]  Department of Neurosurgery  Phone: [phone]  Fax: [phone]        Total time: 60 minutes with more than 30 minutes spent in direct face to face contact reviewing films, providing education, counseling, the importance of good health habits, non-operative therapies,and indications for surgery as well as further follow up.    This note was generated using voice recognition software. While edited for content some inaccurate phrasing may be found. [place]

## 2017-08-25 NOTE — LETTER
"8/25/2017       RE: Marisol Orta  [address] [facility] [address]     Dear Colleague,    Thank you for referring your patient, Marisol Orta, to the [facility] NEUROSURGERY at [facility]. Please see a copy of my visit note below.      Neurosurgery Clinic Consult  Date of Visit: 8/25/2017  Referred for: Left leg pain and  Referring Provider: Kizzy Thomason      Dear Dr. Thomason    We were happy to see Marisol Orta , a pleasant 67 year old year old right handed female for left leg pain and numbness    HPI: As you may recall this nice lady has chronic stable LBP that has been present on and off for years. A number of years ago she was told she had a problem at the L4 5 lumbar level, she had been treated with physical therapy which failed to provide much relief and so she underwent a \"shot in the back which also did not help much. In fact she had a short period of time increased leg pain, a repeat injection was performed within a day or 2, and then her leg pain faded and she did very well after that.    She flared again in July 2016 and with nonoperative management it resolved. In June of this year she started developing left leg numbness along the lateral thigh and a return of her back pain which begins in the left very low back, essentially the mid buttock and shoots down the back of the left leg to the heel.  She was getting ready to have eye surgery at the time so had stopped doing her back exercises. She thinks that is what made it flareup. She mentioned this to her primary care started her in physical therapy, she is about 95% improved now from what she was then. MRI was also performed and based on the findings she was referred for neurosurgical opinion and treatment recommendations.    Her current symptoms (pain, numbness and weakness), a detailed description of alleviating or exacerbating factors, and pain scores are outlined below.  Patient Supplied Answers " To the [facility] Pain Questionnaire  [facility] Pain -  Patient Entered Questionnaire/Answers 8/12/2017   What number best describes your pain right now:  0 = No pain  to  10 = Worst pain imaginable 4   How would you describe the pain? sharp, numbness   Which of the following worsen your pain? sitting   Which of the following improve or reduce your pain?  lying down, standing, walking   What number best describes your average pain for the past week:  0 = No pain  to  10 = Worst pain imaginable 4   What number best describes your LOWEST pain in past 24 hours:  0 = No pain  to  10 = Worst pain imaginable 1   What number best describes your WORST pain in past 24 hours:  0 = No pain  to  10 = Worst pain imaginable 5   When is your pain worst? AM   What non-medicine treatments have you already had for your pain? physical therapy, exercise   Have you tried treating your pain with medication?  Yes   Are you currently taking medications for your pain? No     *  PAIN:  Left S1 distribution  Worse with:  Sitting    5/10       Better with:   Walking and exercise. 1/10   *  NUMBNESS: left lateral thigh, does not go to the calf but does go to the third toe of the left foot.              QUALITY:    Dead feeling .    *  WEAKNESS:   none  *  BOWEL/BLADDER FUNCTION:  Intact.    *  OTHER SYMPTOMS:  No  She presents for evaluation, and treatment recommendations with the understanding that we are a surgical team.    Current Outpatient Prescriptions:      methylPREDNISolone (MEDROL DOSEPAK) 4 MG tablet, Follow package instructions, Disp: 21 tablet, Rfl: 0     triamterene-hydrochlorothiazide (DYAZIDE) 37.5-25 MG per capsule, Take 1 capsule by mouth every morning, Disp: 90 capsule, Rfl: 1     lisinopril (PRINIVIL/ZESTRIL) 2.5 MG tablet, TAKE 1 TABLET (2.5 MG) BY MOUTH DAILY, Disp: 90 tablet, Rfl: 1     Multiple Vitamins-Minerals (MULTIVITAMIN GUMMIES ADULT PO), , Disp: , Rfl:      CALCIUM 600+D PO, 2 tablet daily, Disp: , Rfl:     Allergies   Allergen  Reactions     Nkda [No Known Drug Allergies]        Past Medical History:   Diagnosis Date     Arthritis     osteoarthritis     Back pain      HTN (hypertension)      Hyperlipidemia LDL goal < 130      Nonsenile cataract        Past Surgical History:   Procedure Laterality Date     APPENDECTOMY       CATARACT IOL, RT/LT       COLONOSCOPY  2012?     FOOT SURGERY      left foot 1st  fusion     ORTHOPEDIC SURGERY       PHACOEMULSIFICATION CLEAR CORNEA WITH STANDARD INTRAOCULAR LENS IMPLANT Left 6/6/2017    Procedure: PHACOEMULSIFICATION CLEAR CORNEA WITH STANDARD INTRAOCULAR LENS IMPLANT;  LEFT EYE PHACOEMULSIFICATION CLEAR CORNEA WITH STANDARD INTRAOCULAR LENS IMPLANT ;  Surgeon: Caprice Jiménez MD;  Location: [facility]     PHACOEMULSIFICATION CLEAR CORNEA WITH STANDARD INTRAOCULAR LENS IMPLANT Right 6/20/2017    Procedure: PHACOEMULSIFICATION CLEAR CORNEA WITH STANDARD INTRAOCULAR LENS IMPLANT;  RIGHT EYE PHACOEMULSIFICATION CLEAR CORNEA WITH STANDARD INTRAOCULAR LENS IMPLANT;  Surgeon: Caprice Jiménez MD;  Location: [facility]       Family History   Problem Relation Age of Onset     Lipids Mother      Cancer - colorectal Mother 70     Hypertension Mother      Arthritis Mother      CANCER Mother 84     lung cancer      Thyroid Disease Mother      Colon Cancer Mother      Other Cancer Mother      Arthritis Father      Hypertension Father      Macular Degeneration Father 85     monitors visison with [employer] grid, more trouble reading     Depression Father      Anxiety Disorder Father      DIABETES Maternal Grandmother      DIABETES Paternal Grandmother      Asthma Brother      Depression Brother      Anxiety Disorder Brother      Thyroid Disease Brother      Hypertension Sister      Thyroid Disease Sister      Eye Disorder Son 25     keratoconus?     Glaucoma Daughter 18     montoring pressure     Glaucoma Paternal Aunt      DIABETES Paternal Aunt      CEREBROVASCULAR DISEASE No family hx of        Social History  "    Social History     Marital status:      Spouse name: N/A     Number of children: N/A     Years of education: N/A     Occupational History     Not on file.     Social History Main Topics     Smoking status: Never Smoker     Smokeless tobacco: Never Used      Comment: smoke free household     Alcohol use No     Drug use: No     Sexual activity: Yes     Partners: Male     Birth control/ protection: Post-menopausal     Other Topics Concern     Parent/Sibling W/ Cabg, Mi Or Angioplasty Before 65f 55m? No     Social History Narrative     Problem list and 13 point review of systems: is reviewed in Epic and is negative with the exception of those symptoms associated with HPI and PMH.      OBJECTIVE:   /66  Pulse 72  Ht 1.6 m (5' 3\")  Wt 88.5 kg (195 lb)  BMI 34.54 kg/m2    Imaging:  These are the pertinent radiologist's findings from:  MRI  Lumbar WO 7/25/17 @ [facility] .   1. At L4-L5 there are degenerative changes and a moderate-sized  broad-based left central disc protrusion with moderate to severe  central canal and mild bilateral neural foraminal stenosis.  2. Degenerative changes elsewhere with no other disc herniation seen.  There is moderate central canal stenosis at L3-L4 with mild central  canal stenosis at L2-L3.    See the full report in EPIC/Media tab.  I personally reviewed the images with the patient.      Exam:  Pertinent positives:      Benign exam  *   Well developed, well nourished female found seated comfortably in exam room chair.  She is able to sit and rise independently.  She is accompanied by male spouse.    *  Mental Status  A&O X3.  Bright, alert, affable, interactive. Language fluid, fund of knowledge intact. Good historian.  Mood and affect congruent and WNL.  *  Cranial Nerves  II: Able to read printed forms, VF full to gross confrontation.  III: IV, VI:  PERRLA, EOMI, No nystagmus, no ptosis.  V: Sensation intact in bilateral V1, V2, and V3. Jaw clench symmetric.    VII:  " Intact to voice bilaterally.  IX:  Pushes tongue against bilateral cheeks.  X:  Palate elevates, uvula midline, phonation intact.  XI: Elevates shoulders, head turn intact.  XII: Tongue midline. No fasciculations..  *  Cervical Spine:  DTR's at Biceps, Triceps, and Brachioradialis 2/4 and symmetric.  Sensation intact.    No Florian's. No Phalen's.  No Tinel's. No fasciculations.   Muscle bulk and tone WNL.  Upper Extremity Strength.              RIGHT                LEFT     Deltoid              5/5                   5/5       Biceps              5/5                   5/5        Triceps              5/5                   5/5       Wrist Extensor              5/5                   5/5                     5/5                    5/5       Interossei              5/5                   5/5       EPL              5/5                    5/5       Pinch              5/5                  5/5          *  Lumbar Spine:     DTR's Patellar and Achilles 1/4 and symmetric.   No fasciculations.  Muscle bulk and tone WNL.  No Clonus.  Sensation intact.    Lower Extremity Strength                   RIGHT                   LEFT     Iliopsoas                    5/5                      5/5       Quad                    5/5                        5/5       Hamstring                      5/5                        5/5         Gastrocs                    5/5                        5/5       Tib. Anterior                     5/5                        5/5       EHL                      5/5                        5/5       Babinski                 Down                                      Down                   *  Structural Exam  Inspection of the spine reveals no scoliosis, exaggerated kyphosis or lordosis.  Head is balanced over the pelvis in the coronal and sagittal plane.    Lumbar ROM full.  Able to doff and don socks without difficulty. No spasming, no tenderness, no step offs. No SLR.  No SI tenderness. No trochanteric bursal  tenderness. Negative Diaz's. Negative Warren's.  Feet are warm, pink.  Gait narrow, smooth, no scissoring.     ASSESSMENT:  1. Left lumbar radiculopathy    Central lumbar spinal stenosis at L4 5.  Left foraminal stenosis at L5-S1    PLAN:  I believe both levels may be contributing. However she is made great progress with physical therapy alone and is nearly symptom free at this time.   Therefore we'll try to consolidate these gains with a Medrol Dosepak, encourage her to complete physical therapy, and start on home exercise program.  She can either do HEP prescribed by PT, or yoga or Pilate's.   I will see her back here in 4 weeks to evaluate her progress.    I did a bit of education regarding the natural history of spinal stenosis. Using a model and her own images we discussed that it is a progressive disorder, although it can be managed nicely with nonoperative therapy for long periods of time it often comes to surgery.   I answered all of their questions, which indicated good understanding of the situation.  They are willing to move forward with the recommendations. I supplied them with business cards and telephone numbers and urged them to call should they have questions, comments, or concerns. It's been a pleasure participating in the care of this nice patient. We thank you for your confidence in the [facility], Department of Neurosurgery.    Total time: 60 minutes with more than 30 minutes spent in direct face to face contact reviewing films, providing education, counseling, the importance of good health habits, non-operative therapies,and indications for surgery as well as further follow up.    This note was generated using voice recognition software. While edited for content some inaccurate phrasing may be found. He    Again, thank you for allowing me to participate in the care of your patient.      Sincerely,    Cheyanne Millard PA-C

## 2017-08-25 NOTE — MR AVS SNAPSHOT
After Visit Summary   8/25/2017    Marisol Orta    MRN: [id number]           Patient Information     Date Of Birth          1950        Visit Information        Provider Department      8/25/2017 10:30 AM Cheyanne Millard PA-C [facility] Neurosurgery        Today's Diagnoses     Left lumbar radiculopathy    -  1       Follow-ups after your visit        Your next 10 appointments already scheduled     Aug 29, 2017  9:00 AM CDT   REGG[facility] Spine with Derik Dean PT   [facility] [place] [facility] ([facility] [place])    [address]  [address]  [place] [address]   [phone]            Sep 26, 2017 12:00 PM CDT   (Arrive by 11:45 AM)   Return Visit with REGINO [facility] Neurosurgery ([facility])    [address] [address] [phone]              Who to contact     Please call your clinic at [phone] to:    Ask questions about your health    Make or cancel appointments    Discuss your medicines    Learn about your test results    Speak to your doctor   If you have compliments or concerns about an experience at your clinic, or if you wish to file a complaint, please contact [facility] Patient Relations at [phone] or email us at [email or web address]         Additional Information About Your Visit        MyChart Information     [employer] gives you secure access to your electronic health record. If you see a primary care provider, you can also send messages to your care team and make appointments. If you have questions, please call your primary care clinic.  If you do not have a primary care provider, please call [phone] and they will assist you.      [employer] is an electronic gateway that provides easy, online access to your medical records. With [employer], you can request a clinic appointment, read your test results, renew a prescription or  "communicate with your care team.     To access your existing account, please contact your [facility] or call [phone] for assistance.        Care EveryWhere ID     This is your Care EveryWhere ID. This could be used by other organizations to access your [place] medical records  [id number]        Your Vitals Were     Pulse Height BMI (Body Mass Index)             72 1.6 m (5' 3\") 34.54 kg/m2          Blood Pressure from Last 3 Encounters:   08/25/17 146/66   06/27/17 131/79   06/20/17 117/64    Weight from Last 3 Encounters:   08/25/17 88.5 kg (195 lb)   06/27/17 88.5 kg (195 lb)   06/01/17 88 kg (194 lb)              Today, you had the following     No orders found for display         Today's Medication Changes          These changes are accurate as of: 8/25/17 11:48 AM.  If you have any questions, ask your nurse or doctor.               Start taking these medicines.        Dose/Directions    methylPREDNISolone 4 MG tablet   Commonly known as:  MEDROL DOSEPAK   Used for:  Left lumbar radiculopathy        Follow package instructions   Quantity:  21 tablet   Refills:  0         Stop taking these medicines if you haven't already. Please contact your care team if you have questions.     Bromfenac Sodium Powd           Dexamethasone Acetate Powd           Moxifloxacin HCl Powd                Where to get your medicines      Some of these will need a paper prescription and others can be bought over the counter.  Ask your nurse if you have questions.     Bring a paper prescription for each of these medications     methylPREDNISolone 4 MG tablet                Primary Care Provider Office Phone # Fax #    Kizzy Thomason [phone] [address] [facility] [address]        Equal Access to Services     MATILDE NUGENT AH: Lang Irizarry, steven yeager, lori field. So wa " [phone].    ATENCIÓN: Si jana schultz, tiene a turner disposición servicios gratuitos de asistencia lingüística. Chioma estrada [phone].    We comply with applicable federal civil rights laws and [place] laws. We do not discriminate on the basis of race, color, national origin, age, disability sex, sexual orientation or gender identity.            Thank you!     Thank you for choosing [facility]  for your care. Our goal is always to provide you with excellent care. Hearing back from our patients is one way we can continue to improve our services. Please take a few minutes to complete the written survey that you may receive in the mail after your visit with us. Thank you!             Your Updated Medication List - Protect others around you: Learn how to safely use, store and throw away your medicines at www.disposemymeds.org.          This list is accurate as of: 8/25/17 11:48 AM.  Always use your most recent med list.                   Brand Name Dispense Instructions for use Diagnosis    CALCIUM 600+D PO      2 tablet daily        lisinopril 2.5 MG tablet    PRINIVIL/Zestril    90 tablet    TAKE 1 TABLET (2.5 MG) BY MOUTH DAILY    Essential hypertension with goal blood pressure less than 140/90       methylPREDNISolone 4 MG tablet    MEDROL DOSEPAK    21 tablet    Follow package instructions    Left lumbar radiculopathy       MULTIVITAMIN GUMMIES ADULT PO           triamterene-hydrochlorothiazide 37.5-25 MG per capsule    DYAZIDE    90 capsule    Take 1 capsule by mouth every morning    Hypertension goal BP (blood pressure) < 140/90

## 2017-08-29 ENCOUNTER — THERAPY VISIT (OUTPATIENT)
Dept: PHYSICAL THERAPY | Facility: CLINIC | Age: 67
End: 2017-08-29
Payer: MEDICARE

## 2017-08-29 DIAGNOSIS — M54.42 ACUTE LEFT-SIDED LOW BACK PAIN WITH LEFT-SIDED SCIATICA: ICD-10-CM

## 2017-08-29 PROCEDURE — 97112 NEUROMUSCULAR REEDUCATION: CPT | Mod: GP | Performed by: PHYSICAL THERAPIST

## 2017-08-29 PROCEDURE — 97110 THERAPEUTIC EXERCISES: CPT | Mod: GP | Performed by: PHYSICAL THERAPIST

## 2017-08-29 NOTE — PROGRESS NOTES
"Subjective:    HPI  Oswestry Score: 0 %                 Objective:    System    Physical Exam    General     ROS    Assessment/Plan:      DISCHARGE REPORT    Progress reporting period is from 8/1/17 to 8/29/17.       SUBJECTIVE  Subjective: \"I feel fantastic.\"  Back is great and nothing in her toes.  So pleased with her results when she was initially skeptical    Current Pain level: 0/10.     Initial Pain level: 3/10.   Changes in function:  Yes (See Goal flowsheet attached for changes in current functional level)  Adverse reaction to treatment or activity: None    OBJECTIVE  Objective: Functional lumbar AROM in all motions.  Still feels \"best\" with extension.  After a fair amount of cuing pt did well with glut sets and TA sets     ASSESSMENT/PLAN  Updated problem list and treatment plan: Diagnosis 1:  LBP    STG/LTGs have been met or progress has been made towards goals:  Yes (See Goal flow sheet completed today.)  Assessment of Progress: The patient's condition is improving.  The patient has met all of their long term goals.  Self Management Plans:  Patient is independent in a home treatment program.  Marisol continues to require the following intervention to meet STG and LTG's:  PT intervention is no longer required to meet STG/LTG.    Recommendations:  This patient is ready to be discharged from therapy and continue their home treatment program.    Please refer to the daily flowsheet for treatment today, total treatment time and time spent performing 1:1 timed codes.                "

## 2017-08-29 NOTE — MR AVS SNAPSHOT
After Visit Summary   8/29/2017    Marisol Orta    MRN: [id number]           Patient Information     Date Of Birth          1950        Visit Information        Provider Department      8/29/2017 9:00 AM Derik Dean PT [facility]        Today's Diagnoses     Acute left-sided low back pain with left-sided sciatica           Follow-ups after your visit        Your next 10 appointments already scheduled     Sep 26, 2017 12:00 PM CDT   (Arrive by 11:45 AM)   Return Visit with Cheyanne Millard PA-C   [facility] Neurosurgery ([facility])    [address] [place] [address]  [address] [facility] [address] [phone]              Who to contact     If you have questions or need follow up information about today's clinic visit or your schedule please contact [facility] directly at [phone].  Normal or non-critical lab and imaging results will be communicated to you by MyChart, letter or phone within 4 business days after the clinic has received the results. If you do not hear from us within 7 days, please contact the clinic through [employer] or phone. If you have a critical or abnormal lab result, we will notify you by phone as soon as possible.  Submit refill requests through [employer] or call your pharmacy and they will forward the refill request to us. Please allow 3 business days for your refill to be completed.          Additional Information About Your Visit        MyChart Information     [employer] gives you secure access to your electronic health record. If you see a primary care provider, you can also send messages to your care team and make appointments. If you have questions, please call your primary care clinic.  If you do not have a primary care provider, please call [phone] and they will assist you.        Care EveryWhere ID     This is your Care EveryWhere ID. This could be used by other  organizations to access your [place] medical records  [id number]         Blood Pressure from Last 3 Encounters:   08/25/17 146/66   06/27/17 131/79   06/20/17 117/64    Weight from Last 3 Encounters:   08/25/17 88.5 kg (195 lb)   06/27/17 88.5 kg (195 lb)   06/01/17 88 kg (194 lb)              We Performed the Following     NEUROMUSCULAR RE-EDUCATION     THERAPEUTIC EXERCISES        Primary Care Provider Office Phone # Fax #    Kizzy Juliana Thomason [phone] [address] [facility] [address]        Equal Access to Services     : Hadii aad debbie hadasho Sojamesali, waaxda luqadaha, qaybta kaalmada ademegayada, lori harp . So [facility] [phone].    ATENCIÓN: Si habla español, tiene a turner disposición servicios gratuitos de asistencia lingüística. LlProMed[facility] [phone].    We comply with applicable federal civil rights laws and [place] laws. We do not discriminate on the basis of race, color, national origin, age, disability sex, sexual orientation or gender identity.            Thank you!     Thank you for choosing [facility]  for your care. Our goal is always to provide you with excellent care. Hearing back from our patients is one way we can continue to improve our services. Please take a few minutes to complete the written survey that you may receive in the mail after your visit with us. Thank you!             Your Updated Medication List - Protect others around you: Learn how to safely use, store and throw away your medicines at www.disposemymeds.org.          This list is accurate as of: 8/29/17 11:00 AM.  Always use your most recent med list.                   Brand Name Dispense Instructions for use Diagnosis    CALCIUM 600+D PO      2 tablet daily        lisinopril 2.5 MG tablet    PRINIVIL/Zestril    90 tablet    TAKE 1 TABLET (2.5 MG) BY MOUTH DAILY    Essential hypertension with goal blood pressure less than  140/90       methylPREDNISolone 4 MG tablet    MEDROL DOSEPAK    21 tablet    Follow package instructions    Left lumbar radiculopathy       MULTIVITAMIN GUMMIES ADULT PO           triamterene-hydrochlorothiazide 37.5-25 MG per capsule    DYAZIDE    90 capsule    Take 1 capsule by mouth every morning    Hypertension goal BP (blood pressure) < 140/90

## 2017-09-29 ENCOUNTER — TELEPHONE (OUTPATIENT)
Dept: NEUROLOGY | Facility: CLINIC | Age: 67
End: 2017-09-29

## 2017-09-29 NOTE — TELEPHONE ENCOUNTER
Lumbar MRI report received from [facility]-images sent to imaging to be uploaded. Report copied and put in Dr. Watson folder.    Darla Severin-Brown, LPN

## 2017-12-14 ENCOUNTER — DOCUMENTATION ONLY (OUTPATIENT)
Dept: LAB | Facility: CLINIC | Age: 67
End: 2017-12-14

## 2017-12-14 DIAGNOSIS — I10 HYPERTENSION GOAL BP (BLOOD PRESSURE) < 140/90: Primary | ICD-10-CM

## 2017-12-14 DIAGNOSIS — Z13.6 CARDIOVASCULAR SCREENING; LDL GOAL LESS THAN 160: ICD-10-CM

## 2017-12-15 DIAGNOSIS — Z13.6 CARDIOVASCULAR SCREENING; LDL GOAL LESS THAN 160: ICD-10-CM

## 2017-12-15 DIAGNOSIS — I10 HYPERTENSION GOAL BP (BLOOD PRESSURE) < 140/90: ICD-10-CM

## 2017-12-15 DIAGNOSIS — Z12.31 VISIT FOR SCREENING MAMMOGRAM: ICD-10-CM

## 2017-12-15 LAB
ANION GAP SERPL CALCULATED.3IONS-SCNC: 8 MMOL/L (ref 3–14)
BUN SERPL-MCNC: 11 MG/DL (ref 7–30)
CALCIUM SERPL-MCNC: 9.4 MG/DL (ref 8.5–10.1)
CHLORIDE SERPL-SCNC: 105 MMOL/L (ref 94–109)
CHOLEST SERPL-MCNC: 189 MG/DL
CO2 SERPL-SCNC: 28 MMOL/L (ref 20–32)
CREAT SERPL-MCNC: 0.71 MG/DL (ref 0.52–1.04)
GFR SERPL CREATININE-BSD FRML MDRD: 82 ML/MIN/1.7M2
GLUCOSE SERPL-MCNC: 91 MG/DL (ref 70–99)
HDLC SERPL-MCNC: 80 MG/DL
LDLC SERPL CALC-MCNC: 79 MG/DL
NONHDLC SERPL-MCNC: 109 MG/DL
POTASSIUM SERPL-SCNC: 4 MMOL/L (ref 3.4–5.3)
SODIUM SERPL-SCNC: 141 MMOL/L (ref 133–144)
TRIGL SERPL-MCNC: 150 MG/DL

## 2017-12-15 PROCEDURE — 80061 LIPID PANEL: CPT | Performed by: FAMILY MEDICINE

## 2017-12-15 PROCEDURE — 36415 COLL VENOUS BLD VENIPUNCTURE: CPT | Performed by: FAMILY MEDICINE

## 2017-12-15 PROCEDURE — G0202 SCR MAMMO BI INCL CAD: HCPCS | Mod: TC

## 2017-12-15 PROCEDURE — 80048 BASIC METABOLIC PNL TOTAL CA: CPT | Performed by: FAMILY MEDICINE

## 2017-12-15 NOTE — PROGRESS NOTES
Please review and sign Pending Pre-visit Labs 12/15/17. OV on 12/22/17.   in Epic. Shirley BIRCH

## 2017-12-15 NOTE — PROGRESS NOTES
"I see Dr. Thomason is out of clinic today.  Due to the weekend and specimen stability, can someone from the team please review and place order for Marisol. Thank you!  Shirley Guaman [facility]      Patient has an up coming lab appointment on 12.15.2017 for a \"recheck\". Please review and place future orders that may be needed.     Thank you  Holly Degroot MLT ([facility])  "

## 2018-01-09 NOTE — PROGRESS NOTES
SUBJECTIVE:   Marisol Orta is a 67 year old female who presents to clinic today for the following health issues:      Hypertension Follow-up      Outpatient blood pressures are not being checked.    Low Salt Diet: not monitoring salt    Hypertension ROS: taking medications as instructed, no medication side effects noted, no TIA's, no chest pain on exertion, no dyspnea on exertion, no swelling of ankles.  No headache or visual changes.         Amount of exercise or physical activity:     Problems taking medications regularly: No    Medication side effects: none    Diet:         Problem list and histories reviewed & adjusted, as indicated.  Additional history: as documented    Patient Active Problem List   Diagnosis     Back pain     Osteoarthritis     Hypertension goal BP (blood pressure) < 140/90     Vitreous membranes and strands     Advanced directives, counseling/discussion     CARDIOVASCULAR SCREENING; LDL GOAL LESS THAN 160     Combined form of age-related cataract, right eye     Pseudophakia of both eyes     Lumbar radiculopathy     Past Surgical History:   Procedure Laterality Date     APPENDECTOMY       CATARACT IOL, RT/LT       COLONOSCOPY  2012?     FOOT SURGERY      left foot 1st  fusion     ORTHOPEDIC SURGERY       PHACOEMULSIFICATION CLEAR CORNEA WITH STANDARD INTRAOCULAR LENS IMPLANT Left 6/6/2017    Procedure: PHACOEMULSIFICATION CLEAR CORNEA WITH STANDARD INTRAOCULAR LENS IMPLANT;  LEFT EYE PHACOEMULSIFICATION CLEAR CORNEA WITH STANDARD INTRAOCULAR LENS IMPLANT ;  Surgeon: Caprice Jiménez MD;  Location: [facility]     PHACOEMULSIFICATION CLEAR CORNEA WITH STANDARD INTRAOCULAR LENS IMPLANT Right 6/20/2017    Procedure: PHACOEMULSIFICATION CLEAR CORNEA WITH STANDARD INTRAOCULAR LENS IMPLANT;  RIGHT EYE PHACOEMULSIFICATION CLEAR CORNEA WITH STANDARD INTRAOCULAR LENS IMPLANT;  Surgeon: Caprice Jiménez MD;  Location: [facility]       Social History   Substance Use Topics     Smoking status: Never Smoker      Smokeless tobacco: Never Used      Comment: smoke free household     Alcohol use No     Family History   Problem Relation Age of Onset     Lipids Mother      Cancer - colorectal Mother 70     Hypertension Mother      Arthritis Mother      CANCER Mother 84     lung cancer      Thyroid Disease Mother      Colon Cancer Mother      Other Cancer Mother      Arthritis Father      Hypertension Father      Macular Degeneration Father 85     monitors visison with [employer] grid, more trouble reading     Depression Father      Anxiety Disorder Father      DIABETES Maternal Grandmother      DIABETES Paternal Grandmother      Asthma Brother      Depression Brother      Anxiety Disorder Brother      Thyroid Disease Brother      Hypertension Sister      Thyroid Disease Sister      Eye Disorder Son 25     keratoconus?     Glaucoma Daughter 18     montoring pressure     Glaucoma Paternal Aunt      DIABETES Paternal Aunt      Hypertension Sister      Thyroid Disease Sister      Depression Brother      Anxiety Disorder Brother      Asthma Brother      Thyroid Disease Brother      CEREBROVASCULAR DISEASE No family hx of          Current Outpatient Prescriptions   Medication Sig Dispense Refill     triamterene-hydrochlorothiazide (DYAZIDE) 37.5-25 MG per capsule Take 1 capsule by mouth every morning 90 capsule 1     lisinopril (PRINIVIL/ZESTRIL) 2.5 MG tablet TAKE 1 TABLET (2.5 MG) BY MOUTH DAILY 90 tablet 1     Multiple Vitamins-Minerals (MULTIVITAMIN GUMMIES ADULT PO)        CALCIUM 600+D PO 2 tablet daily       [DISCONTINUED] triamterene-hydrochlorothiazide (DYAZIDE) 37.5-25 MG per capsule Take 1 capsule by mouth every morning 90 capsule 1     [DISCONTINUED] lisinopril (PRINIVIL/ZESTRIL) 2.5 MG tablet TAKE 1 TABLET (2.5 MG) BY MOUTH DAILY 90 tablet 1     BP Readings from Last 3 Encounters:   01/10/18 136/79   08/25/17 146/66   06/27/17 131/79    Wt Readings from Last 3 Encounters:   01/10/18 196 lb (88.9 kg)   08/25/17 195 lb (88.5  "kg)   06/27/17 195 lb (88.5 kg)                  Labs reviewed in EPIC        Reviewed and updated as needed this visit by clinical staffTobacco  Allergies  Meds  Med Hx  Surg Hx  Fam Hx  Soc Hx      Reviewed and updated as needed this visit by Provider         ROS:  Constitutional, HEENT, cardiovascular, pulmonary, gi and gu systems are negative, except as otherwise noted.      OBJECTIVE:   /79  Pulse 75  Temp 98.2  F (36.8  C) (Oral)  Ht 5' 3\" (1.6 m)  Wt 196 lb (88.9 kg)  SpO2 96%  BMI 34.72 kg/m2  Body mass index is 34.72 kg/(m^2).  GENERAL: healthy, alert and no distress  EYES: Eyes grossly normal to inspection, PERRL and conjunctivae and sclerae normal  NECK: no adenopathy, no asymmetry, masses, or scars and thyroid normal to palpation  RESP: lungs clear to auscultation - no rales, rhonchi or wheezes  CV: regular rate and rhythm, normal S1 S2, no S3 or S4, no murmur, click or rub, no peripheral edema and peripheral pulses strong  MS: no gross musculoskeletal defects noted, no edema  SKIN: no suspicious lesions or rashes  PSYCH: mentation appears normal, affect normal/bright    Diagnostic Test Results:  none     ASSESSMENT/PLAN:     (I10) Hypertension goal BP (blood pressure) < 140/90  Comment: well controlled  Plan: triamterene-hydrochlorothiazide (DYAZIDE)         37.5-25 MG per capsule, lisinopril         (PRINIVIL/ZESTRIL) 2.5 MG tablet         Refill x 6 months f/u 6 months for OV and labs wellness exam/non-fsting labs          Kizzy Thomason MD  [facility]    "

## 2018-01-10 ENCOUNTER — OFFICE VISIT (OUTPATIENT)
Dept: FAMILY MEDICINE | Facility: CLINIC | Age: 68
End: 2018-01-10
Payer: MEDICARE

## 2018-01-10 VITALS
HEART RATE: 75 BPM | HEIGHT: 63 IN | TEMPERATURE: 98.2 F | BODY MASS INDEX: 34.73 KG/M2 | DIASTOLIC BLOOD PRESSURE: 79 MMHG | OXYGEN SATURATION: 96 % | SYSTOLIC BLOOD PRESSURE: 136 MMHG | WEIGHT: 196 LBS

## 2018-01-10 DIAGNOSIS — I10 HYPERTENSION GOAL BP (BLOOD PRESSURE) < 140/90: ICD-10-CM

## 2018-01-10 PROCEDURE — 99213 OFFICE O/P EST LOW 20 MIN: CPT | Performed by: FAMILY MEDICINE

## 2018-01-10 RX ORDER — LISINOPRIL 2.5 MG/1
TABLET ORAL
Qty: 90 TABLET | Refills: 1 | Status: SHIPPED | OUTPATIENT
Start: 2018-01-10 | End: 2018-07-03

## 2018-01-10 RX ORDER — TRIAMTERENE AND HYDROCHLOROTHIAZIDE 37.5; 25 MG/1; MG/1
1 CAPSULE ORAL EVERY MORNING
Qty: 90 CAPSULE | Refills: 1 | Status: SHIPPED | OUTPATIENT
Start: 2018-01-10 | End: 2018-07-03

## 2018-01-10 NOTE — MR AVS SNAPSHOT
"              After Visit Summary   1/10/2018    Marisol Orta    MRN: [id number]           Patient Information     Date Of Birth          1950        Visit Information        Provider Department      1/10/2018 8:55 AM Kizzy Thomason MD [facility]        Today's Diagnoses     Hypertension goal BP (blood pressure) < 140/90           Follow-ups after your visit        Follow-up notes from your care team     Return in about 6 months (around 7/10/2018) for Physical Exam, Lab Work non-fasting.      Who to contact     If you have questions or need follow up information about today's clinic visit or your schedule please contact [facility] directly at [phone].  Normal or non-critical lab and imaging results will be communicated to you by MyChart, letter or phone within 4 business days after the clinic has received the results. If you do not hear from us within 7 days, please contact the clinic through [employer] or phone. If you have a critical or abnormal lab result, we will notify you by phone as soon as possible.  Submit refill requests through [employer] or call your pharmacy and they will forward the refill request to us. Please allow 3 business days for your refill to be completed.          Additional Information About Your Visit        MyChart Information     [employer] gives you secure access to your electronic health record. If you see a primary care provider, you can also send messages to your care team and make appointments. If you have questions, please call your primary care clinic.  If you do not have a primary care provider, please call [phone] and they will assist you.        Care EveryWhere ID     This is your Care EveryWhere ID. This could be used by other organizations to access your [facility] medical records  [id number]        Your Vitals Were     Pulse Temperature Height Pulse Oximetry BMI (Body Mass Index)       75 98.2  F (36.8  C) (Oral) 5' 3\" (1.6 m) 96% " 34.72 kg/m2        Blood Pressure from Last 3 Encounters:   01/10/18 136/79   08/25/17 146/66   06/27/17 131/79    Weight from Last 3 Encounters:   01/10/18 196 lb (88.9 kg)   08/25/17 195 lb (88.5 kg)   06/27/17 195 lb (88.5 kg)              Today, you had the following     No orders found for display         Today's Medication Changes          These changes are accurate as of: 1/10/18  9:21 AM.  If you have any questions, ask your nurse or doctor.               These medicines have changed or have updated prescriptions.        Dose/Directions    lisinopril 2.5 MG tablet   Commonly known as:  PRINIVIL/Zestril   This may have changed:  See the new instructions.   Used for:  Hypertension goal BP (blood pressure) < 140/90   Changed by:  Kizzy Thomason MD        TAKE 1 TABLET (2.5 MG) BY MOUTH DAILY   Quantity:  90 tablet   Refills:  1            Where to get your medicines      These medications were sent to [address] - [address] [facility]  [address] [facility] [address]     Phone:  [phone]     lisinopril 2.5 MG tablet    triamterene-hydrochlorothiazide 37.5-25 MG per capsule                Primary Care Provider Office Phone # Fax #    Kizzy Thomason [phone] [address] [facility] [address]        Equal Access to Services     [facility]: Hadii nic ku hadasho Soomaali, waaxda luqadaha, qaybta kaalmada adeegyada, lori barahona. So [facility] [phone].    ATENCIÓN: Si habla español, tiene a turner disposición servicios gratuitos de asistencia lingüística. Llame al [phone].    We comply with applicable federal civil rights laws and [place] laws. We do not discriminate on the basis of race, color, national origin, age, disability, sex, sexual orientation, or gender identity.            Thank you!     Thank you for choosing [facility]  for your care. Our goal is always to provide you with  excellent care. Hearing back from our patients is one way we can continue to improve our services. Please take a few minutes to complete the written survey that you may receive in the mail after your visit with us. Thank you!             Your Updated Medication List - Protect others around you: Learn how to safely use, store and throw away your medicines at www.disposemymeds.org.          This list is accurate as of: 1/10/18  9:21 AM.  Always use your most recent med list.                   Brand Name Dispense Instructions for use Diagnosis    CALCIUM 600+D PO      2 tablet daily        lisinopril 2.5 MG tablet    PRINIVIL/Zestril    90 tablet    TAKE 1 TABLET (2.5 MG) BY MOUTH DAILY    Hypertension goal BP (blood pressure) < 140/90       MULTIVITAMIN GUMMIES ADULT PO           triamterene-hydrochlorothiazide 37.5-25 MG per capsule    DYAZIDE    90 capsule    Take 1 capsule by mouth every morning    Hypertension goal BP (blood pressure) < 140/90

## 2018-01-10 NOTE — NURSING NOTE
"Chief Complaint   Patient presents with     Hypertension       Initial /79  Pulse 75  Temp 98.2  F (36.8  C) (Oral)  Ht 5' 3\" (1.6 m)  Wt 196 lb (88.9 kg)  SpO2 96%  BMI 34.72 kg/m2 Estimated body mass index is 34.72 kg/(m^2) as calculated from the following:    Height as of this encounter: 5' 3\" (1.6 m).    Weight as of this encounter: 196 lb (88.9 kg).  Medication Reconciliation: complete    KURT Parham MA    "

## 2018-01-20 DIAGNOSIS — I10 HYPERTENSION GOAL BP (BLOOD PRESSURE) < 140/90: ICD-10-CM

## 2018-01-23 RX ORDER — TRIAMTERENE AND HYDROCHLOROTHIAZIDE 37.5; 25 MG/1; MG/1
CAPSULE ORAL
Qty: 90 CAPSULE | Refills: 1 | Status: SHIPPED | OUTPATIENT
Start: 2018-01-23 | End: 2018-07-03

## 2018-06-07 ENCOUNTER — DOCUMENTATION ONLY (OUTPATIENT)
Dept: LAB | Facility: CLINIC | Age: 68
End: 2018-06-07

## 2018-06-07 DIAGNOSIS — I10 HYPERTENSION GOAL BP (BLOOD PRESSURE) < 140/90: Primary | ICD-10-CM

## 2018-06-07 NOTE — PROGRESS NOTES
.Please place or confirm pending lab orders for upcoming lab appointment on 6/28/8  Thank you,  Yoli

## 2018-06-07 NOTE — PROGRESS NOTES
Please review and sign Pending Pre-visit Labs in [facility].Labs 06/28/18 and Physical  07/03/18  Shirley BIRCH

## 2018-06-25 NOTE — PATIENT INSTRUCTIONS

## 2018-06-25 NOTE — PROGRESS NOTES
"  SUBJECTIVE:   Marisol Orta is a 68 year old female who presents for Preventive Visit.  {PVP to remind patient that this is not necessarily a physical exam; physical exam may or may not be done:731048::\"click delete button to remove this line now\"}  {PVP to inform patient that additional E&M charge may apply, if additional problems addressed:661723::\"click delete button to remove this line now\"}  Are you in the first 12 months of your Medicare Part B coverage?  {No Yes:780419::\"No\"}    Healthy Habits:    Do you get at least three servings of calcium containing foods daily (dairy, green leafy vegetables, etc.)? {YES/NO, DAIRY INTAKE:401871::\"yes\"}    Amount of exercise or daily activities, outside of work: {AMOUNT EXERCISE:251363}    Problems taking medications regularly {Yes /No default:948582::\"No\"}    Medication side effects: {Yes /No default.:308685::\"No\"}    Have you had an eye exam in the past two years? {YESNOBLANK:218624}    Do you see a dentist twice per year? {YESNOBLANK:567447}    Do you have sleep apnea, excessive snoring or daytime drowsiness?{YESNOBLANK:370547}      Ability to successfully perform activities of daily living: {YES/NO (MEDICARE):982643::\"Yes, no assistance needed\"}    Home safety:  {IPPE SAFETY CONCERNS:023484::\"none identified\"}     Hearing impairment: {NO/YES:832661}    Fall risk:  {Document Fall Risk in the Assessments Section of the Navigator:457862}    {If any of the above assessments are answered yes, consider ordering appropriate referrals (Optional):414095::\"click delete button to remove this line now\"}    {AWV Cognitive Screenin}    {Outside tests to abstract? :185964}    {additional problems to add (Optional):295970}    Reviewed and updated as needed this visit by clinical staff         Reviewed and updated as needed this visit by Provider        Social History   Substance Use Topics     Smoking status: Never Smoker     Smokeless tobacco: Never Used      Comment: " "smoke free household     Alcohol use No       If you drink alcohol do you typically have >3 drinks per day or >7 drinks per week? {ETOH :615427}                        Today's PHQ-2 Score:   PHQ-2 ( 1999 [employer]) 6/25/2017 2/8/2017   Q1: Little interest or pleasure in doing things 0 0   Q2: Feeling down, depressed or hopeless 0 0   PHQ-2 Score 0 0   Q1: Little interest or pleasure in doing things Not at all -   Q2: Feeling down, depressed or hopeless Not at all -   PHQ-2 Score 0 -     {PHQ-2 LOOK IN ASSESSMENTS (Optional) :380468}  Do you feel safe in your environment - {YES/NO/NA:662473}    Do you have a Health Care Directive?: {HEALTHCARE DIRECTIVE STATUS:514120}    Current providers sharing in care for this patient include:   Patient Care Team:  Kizzy Thomason MD as PCP - General (Family Practice)  Cheyanne Millard PA-C as Physician Assistant (Neurosurgery)    The following health maintenance items are reviewed in Epic and correct as of today:  Health Maintenance   Topic Date Due     BMP Q6 MOS  06/15/2018     FALL RISK ASSESSMENT  06/01/2018     PNEUMOCOCCAL (2 of 2 - PPSV23) 06/27/2018     PHQ-2 Q1 YR  06/27/2018     EYE EXAM Q1 YEAR  07/28/2018     MAMMO SCREEN Q2 YR (SYSTEM ASSIGNED)  12/15/2019     TETANUS IMMUNIZATION (SYSTEM ASSIGNED)  06/02/2020     ADVANCE DIRECTIVE PLANNING Q5 YRS  07/27/2021     COLONOSCOPY Q10 YR  05/16/2022     LIPID SCREEN Q5 YR FEMALE (SYSTEM ASSIGNED)  12/15/2022     DEXA SCAN SCREENING (SYSTEM ASSIGNED)  Completed     INFLUENZA VACCINE  Completed     HEPATITIS C SCREENING  Completed     {Chronicprobdata (Optional):481485}    {Decision Support (Optional):560390}    ROS:  {ROS COMP:388289}    OBJECTIVE:   There were no vitals taken for this visit. Estimated body mass index is 34.72 kg/(m^2) as calculated from the following:    Height as of 1/10/18: 5' 3\" (1.6 m).    Weight as of 1/10/18: 196 lb (88.9 kg).  EXAM:   {Exam :523298}    {Diagnostic Test Results " "(Optional):056132::\"Diagnostic Test Results:\",\"none \"}    ASSESSMENT / PLAN:   {Zafar Picklist:512673}    End of Life Planning:  Patient currently has an advanced directive: { :854566}    COUNSELING:  {Medicare Counselin}    BP Readings from Last 1 Encounters:   01/10/18 136/79     Estimated body mass index is 34.72 kg/(m^2) as calculated from the following:    Height as of 1/10/18: 5' 3\" (1.6 m).    Weight as of 1/10/18: 196 lb (88.9 kg).    {BP Counseling- Complete if BP >= 120/80  (Optional):499594}  {Weight Management Plan (ACO) Complete if BMI is abnormal-  Ages 18-64  BMI >24.9.  Age 65+ with BMI <23 or >30 (Optional):066115}     reports that she has never smoked. She has never used smokeless tobacco.  {Tobacco Cessation -- Complete if patient is a smoker (Optional):520830}    Appropriate preventive services were discussed with this patient, including applicable screening as appropriate for cardiovascular disease, diabetes, osteopenia/osteoporosis, and glaucoma.  As appropriate for age/gender, discussed screening for colorectal cancer, prostate cancer, breast cancer, and cervical cancer. Checklist reviewing preventive services available has been given to the patient.    Reviewed patients plan of care and provided an AVS. The {CarePlan:296206} for Marisol meets the Care Plan requirement. This Care Plan has been established and reviewed with the {PATIENT, FAMILY MEMBER, CAREGIVER:961087}.    Counseling Resources:  ATP IV Guidelines  Pooled Cohorts Equation Calculator  Breast Cancer Risk Calculator  FRAX Risk Assessment  ICSI Preventive Guidelines  Dietary Guidelines for Americans,   USDA's MyPlate  ASA Prophylaxis  Lung CA Screening    Kizzy Thomason MD  [facility]  "

## 2018-06-28 DIAGNOSIS — I10 HYPERTENSION GOAL BP (BLOOD PRESSURE) < 140/90: ICD-10-CM

## 2018-06-28 LAB
ANION GAP SERPL CALCULATED.3IONS-SCNC: 5 MMOL/L (ref 3–14)
BUN SERPL-MCNC: 18 MG/DL (ref 7–30)
CALCIUM SERPL-MCNC: 9.4 MG/DL (ref 8.5–10.1)
CHLORIDE SERPL-SCNC: 105 MMOL/L (ref 94–109)
CO2 SERPL-SCNC: 31 MMOL/L (ref 20–32)
CREAT SERPL-MCNC: 0.67 MG/DL (ref 0.52–1.04)
GFR SERPL CREATININE-BSD FRML MDRD: 89 ML/MIN/1.7M2
GLUCOSE SERPL-MCNC: 103 MG/DL (ref 70–99)
POTASSIUM SERPL-SCNC: 4.1 MMOL/L (ref 3.4–5.3)
SODIUM SERPL-SCNC: 141 MMOL/L (ref 133–144)

## 2018-06-28 PROCEDURE — 36415 COLL VENOUS BLD VENIPUNCTURE: CPT | Performed by: FAMILY MEDICINE

## 2018-06-28 PROCEDURE — 80048 BASIC METABOLIC PNL TOTAL CA: CPT | Performed by: FAMILY MEDICINE

## 2018-06-30 ASSESSMENT — ACTIVITIES OF DAILY LIVING (ADL)
I_NEED_ASSISTANCE_FOR_THE_FOLLOWING_DAILY_ACTIVITIES:: NO ASSISTANCE IS NEEDED
CURRENT_FUNCTION: NO ASSISTANCE NEEDED

## 2018-07-02 ASSESSMENT — ACTIVITIES OF DAILY LIVING (ADL): CURRENT_FUNCTION: NO ASSISTANCE NEEDED

## 2018-07-02 NOTE — PROGRESS NOTES
SUBJECTIVE:   Marisol Orta is a 68 year old female who presents for Preventive Visit.      Are you in the first 12 months of your Medicare coverage?  No    Physical   Annual:     Getting at least 3 servings of Calcium per day:  Yes    Bi-annual eye exam:  Yes    Dental care twice a year:  Yes    Sleep apnea or symptoms of sleep apnea:  None    Diet:  Regular (no restrictions)    Frequency of exercise:  4-5 days/week    Duration of exercise:  30-45 minutes    Taking medications regularly:  Yes    Medication side effects:  None    Additional concerns today:  No    Ability to successfully perform activities of daily living: no assistance needed    Home Safety:  No safety concerns identified    Hearing Impairment: no hearing concerns        Fall risk:  Fallen 2 or more times in the past year?: No  Any fall with injury in the past year?: No    COGNITIVE SCREEN  1) Repeat 3 items (Leader, Season, Table)    2) Clock draw: NORMAL  3) 3 item recall: Recalls 3 objects  Results: NORMAL clock, 1-2 items recalled: COGNITIVE IMPAIRMENT LESS LIKELY    Mini-CogTM Copyright ANA Daniel. Licensed by the author for use in [facility]; reprinted with permission (kei@[facility]). All rights reserved.        Reviewed and updated as needed this visit by clinical staff  Tobacco  Allergies  Meds  Problems  Med Hx  Surg Hx  Fam Hx  Soc Hx          Reviewed and updated as needed this visit by Provider  Allergies  Meds  Problems        Social History   Substance Use Topics     Smoking status: Never Smoker     Smokeless tobacco: Never Used      Comment: smoke free household     Alcohol use No       Alcohol Use 6/30/2018   If you drink alcohol do you typically have greater than 3 drinks per day OR greater than 7 drinks per week? No   No flowsheet data found.            Today's PHQ-2 Score:   PHQ-2 ( 1999 [employer]) 6/30/2018   Q1: Little interest or pleasure in doing things 0   Q2: Feeling down, depressed or hopeless 0   PHQ-2  Score 0   Q1: Little interest or pleasure in doing things Not at all   Q2: Feeling down, depressed or hopeless Not at all   PHQ-2 Score 0       Do you feel safe in your environment - Yes    Do you have a Health Care Directive?: Yes: Patient states has Advance Directive and will bring in a copy to clinic.    G 2 P 2   No LMP recorded. Patient is postmenopausal.     Fasting: No   Td: tdap        NO - age 65 - see link Cervical Cytology Screening Guidelines               Cholesterol:   Lab Results   Component Value Date    CHOL 189 12/15/2017     Lab Results   Component Value Date    HDL 80 12/15/2017     Lab Results   Component Value Date    LDL 79 12/15/2017     Lab Results   Component Value Date    TRIG 150 12/15/2017     Lab Results   Component Value Date    CHOLHDLRATIO 2.6 2014         MM/17  Dexa:       Flex/colo:       Seat Belt: Yes    Sunscreen use: Yes   Calcium Intake: adeq  Health Care Directive: Yes   Sexually Active: Yes     Current contraception: none  History of abnormal Pap smear: No  Family history of colon/breast/ovarian cancer: Yes: see [facility]  Regular self breast exam: No  History of abnormal mammogram: No      Current providers sharing in care for this patient include:   Patient Care Team:  Kizzy Thomason MD as PCP - General (Family Practice)  Cheyanne Millard PA-C as Physician Assistant (Neurosurgery)    The following health maintenance items are reviewed in Epic and correct as of today:  Health Maintenance   Topic Date Due     FALL RISK ASSESSMENT  2018     PNEUMOCOCCAL (2 of 2 - PPSV23) 2018     EYE EXAM Q1 YEAR  2018     INFLUENZA VACCINE (1) 2018     BMP Q6 MOS  2018     PHQ-2 Q1 YR  2019     MAMMO SCREEN Q2 YR (SYSTEM ASSIGNED)  12/15/2019     TETANUS IMMUNIZATION (SYSTEM ASSIGNED)  2020     ADVANCE DIRECTIVE PLANNING Q5 YRS  2021     COLONOSCOPY Q10 YR  2022     LIPID SCREEN Q5 YR FEMALE (SYSTEM ASSIGNED)   12/15/2022     DEXA SCAN SCREENING (SYSTEM ASSIGNED)  Completed     HEPATITIS C SCREENING  Completed     Labs reviewed in EPIC  BP Readings from Last 3 Encounters:   07/03/18 139/78   01/10/18 136/79   08/25/17 146/66    Wt Readings from Last 3 Encounters:   07/03/18 195 lb 12.8 oz (88.8 kg)   01/10/18 196 lb (88.9 kg)   08/25/17 195 lb (88.5 kg)                  Patient Active Problem List   Diagnosis     Back pain     Osteoarthritis     Hypertension goal BP (blood pressure) < 140/90     Vitreous membranes and strands     Advanced directives, counseling/discussion     CARDIOVASCULAR SCREENING; LDL GOAL LESS THAN 160     Combined form of age-related cataract, right eye     Pseudophakia of both eyes     Lumbar radiculopathy     Past Surgical History:   Procedure Laterality Date     APPENDECTOMY       CATARACT IOL, RT/LT       COLONOSCOPY  2012?     FOOT SURGERY      left foot 1st  fusion     ORTHOPEDIC SURGERY       PHACOEMULSIFICATION CLEAR CORNEA WITH STANDARD INTRAOCULAR LENS IMPLANT Left 6/6/2017    Procedure: PHACOEMULSIFICATION CLEAR CORNEA WITH STANDARD INTRAOCULAR LENS IMPLANT;  LEFT EYE PHACOEMULSIFICATION CLEAR CORNEA WITH STANDARD INTRAOCULAR LENS IMPLANT ;  Surgeon: Caprice Jiménez MD;  Location: [facility]     PHACOEMULSIFICATION CLEAR CORNEA WITH STANDARD INTRAOCULAR LENS IMPLANT Right 6/20/2017    Procedure: PHACOEMULSIFICATION CLEAR CORNEA WITH STANDARD INTRAOCULAR LENS IMPLANT;  RIGHT EYE PHACOEMULSIFICATION CLEAR CORNEA WITH STANDARD INTRAOCULAR LENS IMPLANT;  Surgeon: Caprice Jiménez MD;  Location: [facility]       Social History   Substance Use Topics     Smoking status: Never Smoker     Smokeless tobacco: Never Used      Comment: smoke free household     Alcohol use No     Family History   Problem Relation Age of Onset     Lipids Mother      Cancer - colorectal Mother 70     Hypertension Mother      Arthritis Mother      Cancer Mother 84     lung cancer      Thyroid Disease Mother      Colon Cancer  "Mother      Other Cancer Mother      Arthritis Father      Hypertension Father      Macular Degeneration Father 85     monitors visison with [employer] grid, more trouble reading     Depression Father      Anxiety Disorder Father      Diabetes Maternal Grandmother      Diabetes Paternal Grandmother      Asthma Brother      Depression Brother      Anxiety Disorder Brother      Thyroid Disease Brother      Hypertension Sister      Thyroid Disease Sister      Eye Disorder Son 25     keratoconus?     Glaucoma Daughter 18     montoring pressure     Glaucoma Paternal Aunt      Diabetes Paternal Aunt      Hypertension Sister      Thyroid Disease Sister      Depression Brother      Anxiety Disorder Brother      Asthma Brother      Thyroid Disease Brother      Cerebrovascular Disease No family hx of          Current Outpatient Prescriptions   Medication Sig Dispense Refill     CALCIUM 600+D PO 2 tablet daily       lisinopril (PRINIVIL/ZESTRIL) 2.5 MG tablet TAKE 1 TABLET (2.5 MG) BY MOUTH DAILY 90 tablet 1     Multiple Vitamins-Minerals (MULTIVITAMIN GUMMIES ADULT PO)        triamterene-hydrochlorothiazide (DYAZIDE) 37.5-25 MG per capsule Take 1 capsule by mouth every morning 90 capsule 1     [DISCONTINUED] triamterene-hydrochlorothiazide (DYAZIDE) 37.5-25 MG per capsule TAKE 1 CAPSULE BY MOUTH EVERY MORNING 90 capsule 1       Pneumonia Vaccine:completed  Mammogram Screening: Patient over age 50, mutual decision to screen reflected in health maintenance.  Shingrix: recommended    Review of Systems  Constitutional, HEENT, cardiovascular, pulmonary, gi and gu systems are negative, except as otherwise noted.    OBJECTIVE:   /78  Pulse 73  Temp 97.1  F (36.2  C) (Oral)  Resp 18  Wt 195 lb 12.8 oz (88.8 kg)  BMI 34.68 kg/m2 Estimated body mass index is 34.68 kg/(m^2) as calculated from the following:    Height as of 1/10/18: 5' 3\" (1.6 m).    Weight as of this encounter: 195 lb 12.8 oz (88.8 kg).  Physical Exam  GENERAL " APPEARANCE: healthy, alert and no distress  EYES: Eyes grossly normal to inspection, PERRL and conjunctivae and sclerae normal  HENT: ear canals and TM's normal, nose and mouth without ulcers or lesions, oropharynx clear and oral mucous membranes moist  NECK: no adenopathy, no asymmetry, masses, or scars and thyroid normal to palpation  RESP: lungs clear to auscultation - no rales, rhonchi or wheezes  BREAST: normal without masses, tenderness or nipple discharge and no palpable axillary masses or adenopathy  CV: regular rate and rhythm, normal S1 S2, no S3 or S4, no murmur, click or rub, no peripheral edema and peripheral pulses strong  ABDOMEN: soft, nontender, no hepatosplenomegaly, no masses and bowel sounds normal   (female): normal female external genitalia, normal urethral meatus, vaginal mucosal atrophy noted, normal skin  MS: no musculoskeletal defects are noted and gait is age appropriate without ataxia  SKIN: no suspicious lesions or rashes  NEURO: Normal strength and tone, sensory exam grossly normal, mentation intact and speech normal  PSYCH: mentation appears normal and affect normal/bright    Diagnostic Test Results:  none     ASSESSMENT / PLAN:   (Z00.00) Medicare annual wellness visit, subsequent  (primary encounter diagnosis)  Comment: preventive needs reviewed  Plan: see orders in Epic.     (I10) Hypertension goal BP (blood pressure) < 140/90  Comment: to goal  Plan: lisinopril (PRINIVIL/ZESTRIL) 2.5 MG tablet,         triamterene-hydrochlorothiazide (DYAZIDE)         37.5-25 MG per capsule         Refill x 6 months, f/u 6 months for BP check and labs     (Z23) Need for vaccination  Comment: due  Plan: Pneumococcal vaccine 23 valent PPSV23          (Pneumovax) [82012], ADMIN MEDICARE:         Pneumococcal Vaccine ()        \    (R73.01) Elevated fasting glucose  Comment: on recent labs  Plan: Hemoglobin A1c        Strong fam hx of diabetes      End of Life Planning:  Patient currently has an  "advanced directive: Yes.  Practitioner is supportive of decision.    COUNSELING:  Reviewed preventive health counseling, as reflected in patient instructions  Special attention given to:       Regular exercise       Healthy diet/nutrition    BP Readings from Last 1 Encounters:   07/03/18 139/78     Estimated body mass index is 34.68 kg/(m^2) as calculated from the following:    Height as of 1/10/18: 5' 3\" (1.6 m).    Weight as of this encounter: 195 lb 12.8 oz (88.8 kg).      Weight management plan: Discussed healthy diet and exercise guidelines and patient will follow up in 6 months in clinic to re-evaluate.     reports that she has never smoked. She has never used smokeless tobacco.      Appropriate preventive services were discussed with this patient, including applicable screening as appropriate for cardiovascular disease, diabetes, osteopenia/osteoporosis, and glaucoma.  As appropriate for age/gender, discussed screening for colorectal cancer, prostate cancer, breast cancer, and cervical cancer. Checklist reviewing preventive services available has been given to the patient.    Reviewed patients plan of care and provided an AVS. The Basic Care Plan (routine screening as documented in Health Maintenance) for Marisol meets the Care Plan requirement. This Care Plan has been established and reviewed with the Patient.    Counseling Resources:  ATP IV Guidelines  Pooled Cohorts Equation Calculator  Breast Cancer Risk Calculator  FRAX Risk Assessment  ICSI Preventive Guidelines  Dietary Guidelines for Americans, 2010  USDA's MyPlate  ASA Prophylaxis  Lung CA Screening    Kizzy Thomason MD  [facility] [place]  Answers for HPI/ROS submitted by the patient on 6/30/2018   PHQ-2 Score: 0    "

## 2018-07-03 ENCOUNTER — OFFICE VISIT (OUTPATIENT)
Dept: FAMILY MEDICINE | Facility: CLINIC | Age: 68
End: 2018-07-03
Payer: MEDICARE

## 2018-07-03 VITALS
WEIGHT: 195.8 LBS | DIASTOLIC BLOOD PRESSURE: 78 MMHG | RESPIRATION RATE: 18 BRPM | HEART RATE: 73 BPM | TEMPERATURE: 97.1 F | SYSTOLIC BLOOD PRESSURE: 139 MMHG | BODY MASS INDEX: 34.68 KG/M2

## 2018-07-03 DIAGNOSIS — Z00.00 MEDICARE ANNUAL WELLNESS VISIT, SUBSEQUENT: Primary | ICD-10-CM

## 2018-07-03 DIAGNOSIS — R73.01 ELEVATED FASTING GLUCOSE: ICD-10-CM

## 2018-07-03 DIAGNOSIS — Z23 NEED FOR VACCINATION: ICD-10-CM

## 2018-07-03 DIAGNOSIS — I10 HYPERTENSION GOAL BP (BLOOD PRESSURE) < 140/90: ICD-10-CM

## 2018-07-03 LAB — HBA1C MFR BLD: 5.1 % (ref 0–5.6)

## 2018-07-03 PROCEDURE — G0009 ADMIN PNEUMOCOCCAL VACCINE: HCPCS | Performed by: FAMILY MEDICINE

## 2018-07-03 PROCEDURE — 90732 PPSV23 VACC 2 YRS+ SUBQ/IM: CPT | Performed by: FAMILY MEDICINE

## 2018-07-03 PROCEDURE — G0438 PPPS, INITIAL VISIT: HCPCS | Performed by: FAMILY MEDICINE

## 2018-07-03 PROCEDURE — 83036 HEMOGLOBIN GLYCOSYLATED A1C: CPT | Performed by: FAMILY MEDICINE

## 2018-07-03 PROCEDURE — 36415 COLL VENOUS BLD VENIPUNCTURE: CPT | Performed by: FAMILY MEDICINE

## 2018-07-03 RX ORDER — LISINOPRIL 2.5 MG/1
TABLET ORAL
Qty: 90 TABLET | Refills: 1 | Status: SHIPPED | OUTPATIENT
Start: 2018-07-03 | End: 2019-01-08

## 2018-07-03 RX ORDER — TRIAMTERENE AND HYDROCHLOROTHIAZIDE 37.5; 25 MG/1; MG/1
1 CAPSULE ORAL EVERY MORNING
Qty: 90 CAPSULE | Refills: 1 | Status: SHIPPED | OUTPATIENT
Start: 2018-07-03 | End: 2019-01-08

## 2018-07-03 NOTE — NURSING NOTE
"Chief Complaint   Patient presents with     Wellness Visit     Health Maintenance     orders pended, pcv 23       Initial /78  Pulse 73  Temp 97.1  F (36.2  C) (Oral)  Resp 18  Wt 195 lb 12.8 oz (88.8 kg)  BMI 34.68 kg/m2 Estimated body mass index is 34.68 kg/(m^2) as calculated from the following:    Height as of 1/10/18: 5' 3\" (1.6 m).    Weight as of this encounter: 195 lb 12.8 oz (88.8 kg).  Medication Reconciliation: complete  Felix Jones CMA    Prior to injection verified patient identity using patient's name and date of birth.    Patient instructed to wait in clinic for 20 minutes following injection and to notify staff of any adverse reactions immediately.     Screening Questionnaire for Adult Immunization     Are you sick today?   Yes   Do you have allergies to medications, food or any vaccine?   No   Have you ever had a serious reaction after receiving a vaccination?   No   Do you have a long-term health problem with heart disease, lung disease,  asthma, kidney disease, diabetes, anemia, metabolic or blood disease?   No   Do you have cancer, leukemia, AIDS, or any immune system problem?   No   Do you take cortisone, prednisone, other steroids, or anticancer drugs, or  have you had any x-ray (radiation) treatments?   No   Have you had a seizure, brain, or other nervous system problem?   No   During the past year, have you received a transfusion of blood or blood       products, or been given a medicine called immune (gamma) globulin?   No   For women: Are you pregnant or is there a chance you could become         pregnant during the next month?   No   Have you received any vaccinations in the past 4 weeks?   No    Immunization questionnaire answers were all negative.      MNVFC doesn't apply on this patient  Felix Jones CMA    Prior to injection verified patient identity using patient's name and date of birth.  Due to injection administration, patient instructed to remain in clinic " for 15 minutes  afterwards, and to report any adverse reaction to me immediately.    Felix Jones CMA

## 2018-07-03 NOTE — MR AVS SNAPSHOT
After Visit Summary   7/3/2018    Marisol Orta    MRN: [id number]           Patient Information     Date Of Birth          1950        Visit Information        Provider Department      7/3/2018 7:35 AM Kizzy Thomason MD [facility]        Today's Diagnoses     Medicare annual wellness visit, subsequent    -  1    Hypertension goal BP (blood pressure) < 140/90        Need for vaccination        Elevated fasting glucose          Care Instructions      Preventive Health Recommendations    Female Ages 65 +    Yearly exam:     See your health care provider every year in order to  o Review health changes.   o Discuss preventive care.    o Review your medicines if your doctor has prescribed any.      You no longer need a yearly Pap test unless you've had an abnormal Pap test in the past 10 years. If you have vaginal symptoms, such as bleeding or discharge, be sure to talk with your provider about a Pap test.      Every 1 to 2 years, have a mammogram.  If you are over 69, talk with your health care provider about whether or not you want to continue having screening mammograms.      Every 10 years, have a colonoscopy. Or, have a yearly FIT test (stool test). These exams will check for colon cancer.       Have a cholesterol test every 5 years, or more often if your doctor advises it.       Have a diabetes test (fasting glucose) every three years. If you are at risk for diabetes, you should have this test more often.       At age 65, have a bone density scan (DEXA) to check for osteoporosis (brittle bone disease).    Shots:    Get a flu shot each year.    Get a tetanus shot every 10 years.    Talk to your doctor about your pneumonia vaccines. There are now two you should receive - Pneumovax (PPSV 23) and Prevnar (PCV 13).    Talk to your pharmacist about the shingles vaccine.    Talk to your doctor about the hepatitis B vaccine.    Nutrition:     Eat at least 5 servings of fruits and  vegetables each day.      Eat whole-grain bread, whole-wheat pasta and brown rice instead of white grains and rice.      Get adequate Calcium and Vitamin D.     Lifestyle    Exercise at least 150 minutes a week (30 minutes a day, 5 days a week). This will help you control your weight and prevent disease.      Limit alcohol to one drink per day.      No smoking.       Wear sunscreen to prevent skin cancer.       See your dentist twice a year for an exam and cleaning.      See your eye doctor every 1 to 2 years to screen for conditions such as glaucoma, macular degeneration and cataracts.          Follow-ups after your visit        Your next 10 appointments already scheduled     Jul 31, 2018  8:00 AM CDT   New Visit with Sindhu Leach OD   [facility]    [address] [facility] [address] [phone]              Who to contact     If you have questions or need follow up information about today's clinic visit or your schedule please contact [facility] directly at [phone].  Normal or non-critical lab and imaging results will be communicated to you by Geddithart, letter or phone within 4 business days after the clinic has received the results. If you do not hear from us within 7 days, please contact the clinic through E-Box - [email or web address] or phone. If you have a critical or abnormal lab result, we will notify you by phone as soon as possible.  Submit refill requests through Aggredyne or call your pharmacy and they will forward the refill request to us. Please allow 3 business days for your refill to be completed.          Additional Information About Your Visit        Aggredyne Information     [employer] gives you secure access to your electronic health record. If you see a primary care provider, you can also send messages to your care team and make appointments. If you have questions, please call your primary care clinic.  If you do not have a primary care provider,  please call [phone] and they will assist you.        Care EveryWhere ID     This is your Care EveryWhere ID. This could be used by other organizations to access your [facility] medical records  [id number]        Your Vitals Were     Pulse Temperature Respirations BMI (Body Mass Index)          73 97.1  F (36.2  C) (Oral) 18 34.68 kg/m2         Blood Pressure from Last 3 Encounters:   07/03/18 139/78   01/10/18 136/79   08/25/17 146/66    Weight from Last 3 Encounters:   07/03/18 195 lb 12.8 oz (88.8 kg)   01/10/18 196 lb (88.9 kg)   08/25/17 195 lb (88.5 kg)              We Performed the Following     ADMIN MEDICARE: Pneumococcal Vaccine ()     Hemoglobin A1c     Pneumococcal vaccine 23 valent PPSV23  (Pneumovax) [05526]          Where to get your medicines      These medications were sent to [facility] [address] [place] [address] - [facility]  [address] [facility] [address]     Phone:  [phone]     lisinopril 2.5 MG tablet    triamterene-hydrochlorothiazide 37.5-25 MG per capsule          Primary Care Provider Office Phone # Fax #    Kizzy Thomason [phone] [address] [facility] [address]        Equal Access to Services     MATILDE NUGENT : Hadii nic lewis hadasho Soomaali, waaxda luqadaha, qaybta kaalmada kaya, lori barahona. So [facility] [phone].    ATENCIÓN: Si habla español, tiene a turner disposición servicios gratuitos de asistencia lingüística. Chioma al [phone].    We comply with applicable federal civil rights laws and [place] laws. We do not discriminate on the basis of race, color, national origin, age, disability, sex, sexual orientation, or gender identity.            Thank you!     Thank you for choosing [facility]  for your care. Our goal is always to provide you with excellent care. Hearing back from our patients is one way we can continue to improve our services. Please take a few  minutes to complete the written survey that you may receive in the mail after your visit with us. Thank you!             Your Updated Medication List - Protect others around you: Learn how to safely use, store and throw away your medicines at www.disposemymeds.org.          This list is accurate as of 7/3/18  7:58 AM.  Always use your most recent med list.                   Brand Name Dispense Instructions for use Diagnosis    CALCIUM 600+D PO      2 tablet daily        lisinopril 2.5 MG tablet    PRINIVIL/Zestril    90 tablet    TAKE 1 TABLET (2.5 MG) BY MOUTH DAILY    Hypertension goal BP (blood pressure) < 140/90       MULTIVITAMIN GUMMIES ADULT PO           triamterene-hydrochlorothiazide 37.5-25 MG per capsule    DYAZIDE    90 capsule    Take 1 capsule by mouth every morning    Hypertension goal BP (blood pressure) < 140/90

## 2018-07-31 ENCOUNTER — OFFICE VISIT (OUTPATIENT)
Dept: OPTOMETRY | Facility: CLINIC | Age: 68
End: 2018-07-31
Payer: MEDICARE

## 2018-07-31 DIAGNOSIS — Z96.1 PSEUDOPHAKIA OF BOTH EYES: Primary | ICD-10-CM

## 2018-07-31 DIAGNOSIS — H52.223 REGULAR ASTIGMATISM OF BOTH EYES: ICD-10-CM

## 2018-07-31 DIAGNOSIS — H26.8 PXF (PSEUDOEXFOLIATION OF LENS CAPSULE): ICD-10-CM

## 2018-07-31 DIAGNOSIS — H52.4 PRESBYOPIA: ICD-10-CM

## 2018-07-31 PROCEDURE — 92015 DETERMINE REFRACTIVE STATE: CPT | Mod: GY | Performed by: OPTOMETRIST

## 2018-07-31 PROCEDURE — 92014 COMPRE OPH EXAM EST PT 1/>: CPT | Performed by: OPTOMETRIST

## 2018-07-31 ASSESSMENT — REFRACTION_MANIFEST
OS_ADD: +3.00
OS_AXIS: 108
OS_SPHERE: -1.00
METHOD_AUTOREFRACTION: 1
OD_CYLINDER: +1.00
OD_CYLINDER: +1.00
OD_AXIS: 045
OS_CYLINDER: +1.50
OD_AXIS: 050
OS_AXIS: 110
OD_SPHERE: -0.75
OD_SPHERE: -0.50
OS_SPHERE: -0.75
OS_CYLINDER: +1.25
OD_ADD: +3.00

## 2018-07-31 ASSESSMENT — KERATOMETRY
OS_AXISANGLE2_DEGREES: 14
OS_K2POWER_DIOPTERS: 44.00
OD_AXISANGLE2_DEGREES: 142
OD_K2POWER_DIOPTERS: 43.25
OD_K1POWER_DIOPTERS: 42.25
OS_K1POWER_DIOPTERS: 42.50

## 2018-07-31 ASSESSMENT — CONF VISUAL FIELD
OD_NORMAL: 1
OS_NORMAL: 1
METHOD: COUNTING FINGERS

## 2018-07-31 ASSESSMENT — REFRACTION_WEARINGRX
OD_AXIS: 055
OS_CYLINDER: +0.75
OS_ADD: +3.00
OS_SPHERE: -0.25
OS_AXIS: 120
OD_CYLINDER: +1.00
OD_ADD: +3.00
SPECS_TYPE: PAL
OD_SPHERE: -0.50

## 2018-07-31 ASSESSMENT — VISUAL ACUITY
OD_CC: 20/20
OS_CC: 20/20
OD_CC: 20/20-1
OS_CC+: -2
METHOD: SNELLEN - LINEAR
CORRECTION_TYPE: GLASSES
OS_CC: 20/25

## 2018-07-31 ASSESSMENT — TONOMETRY
OS_IOP_MMHG: 16
IOP_METHOD: APPLANATION
OD_IOP_MMHG: 16

## 2018-07-31 ASSESSMENT — PACHYMETRY
OD_CT(UM): .605
OS_CT(UM): .601

## 2018-07-31 ASSESSMENT — CUP TO DISC RATIO
OS_RATIO: 0.2
OD_RATIO: 0.2

## 2018-07-31 ASSESSMENT — SLIT LAMP EXAM - LIDS
COMMENTS: NORMAL
COMMENTS: NORMAL

## 2018-07-31 ASSESSMENT — EXTERNAL EXAM - RIGHT EYE: OD_EXAM: NORMAL

## 2018-07-31 ASSESSMENT — EXTERNAL EXAM - LEFT EYE: OS_EXAM: NORMAL

## 2018-07-31 NOTE — PROGRESS NOTES
Chief Complaint   Patient presents with     COMPREHENSIVE EYE EXAM     yearly         Last Eye Exam: 7/2017  Dilated Previously: Yes    What are you currently using to see?  Glasses, wears them all of the time        Distance Vision Acuity: Satisfied with vision, no changes     Near Vision Acuity: Satisfied with vision while reading and using computer with glasses    Eye Comfort: good usually, did mention that it takes her eyes a few minutes to transition from near to far   Do you use eye drops? : No  Occupation or Hobbies: Retired     Margarita Apple Optometric Assistant           Medical, surgical and family histories reviewed and updated 7/31/2018.      father had macular degeneration , daughter glaucoma      OBJECTIVE: See Ophthalmology exam    ASSESSMENT:    ICD-10-CM    1. Pseudophakia of both eyes Z96.1 EYE EXAM (SIMPLE-NONBILLABLE)     REFRACTION   2. Regular astigmatism of both eyes H52.223 EYE EXAM (SIMPLE-NONBILLABLE)     REFRACTION   3. Presbyopia H52.4 EYE EXAM (SIMPLE-NONBILLABLE)     REFRACTION   4. 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  Optional to fill new glasses prescription, minimal change  Return in 1 year for eye exam    Sindhu Leach O.D.  [facility]   [address] [place] [address] [phone]

## 2018-07-31 NOTE — MR AVS SNAPSHOT
After Visit Summary   7/31/2018    Marisol Orta    MRN: [id number]           Patient Information     Date Of Birth          1950        Visit Information        Provider Department      7/31/2018 8:00 AM Sindhu Leach OD [facility]        Today's Diagnoses     Regular astigmatism of both eyes    -  1    Presbyopia          Care Instructions    Patient was advised of today's exam findings.  Optional to fill new glasses prescription, minimal change  Return in 1 year for eye exam    Sindhu Leach O.D.  [facility]   [address] [place] [address]  [phone]            Follow-ups after your visit        Your next 10 appointments already scheduled     Jan 02, 2019  7:30 AM CST   LAB with AN LAB   [facility]    [address] [facility] [address] [phone]           Please do not eat 10-12 hours before your appointment if you are coming in fasting for labs on lipids, cholesterol, or glucose (sugar). This does not apply to pregnant women. Water, hot tea and black coffee (with nothing added) are okay. Do not drink other fluids, diet soda or chew gum.            Jan 08, 2019  7:35 AM CST   SHORT with Kizzy Thomason MD   [facility]    [address] [facility] [address] [phone]              Who to contact     If you have questions or need follow up information about today's clinic visit or your schedule please contact [facility] directly at [phone].  Normal or non-critical lab and imaging results will be communicated to you by MyChart, letter or phone within 4 business days after the clinic has received the results. If you do not hear from us within 7 days, please contact the clinic through MyChart or phone. If you have a critical or abnormal lab result, we will notify you by phone as soon as possible.  Submit refill requests  through [employer] or call your pharmacy and they will forward the refill request to us. Please allow 3 business days for your refill to be completed.          Additional Information About Your Visit        [employer] Information     [employer] gives you secure access to your electronic health record. If you see a primary care provider, you can also send messages to your care team and make appointments. If you have questions, please call your primary care clinic.  If you do not have a primary care provider, please call [phone] and they will assist you.        Care EveryWhere ID     This is your Care EveryWhere ID. This could be used by other organizations to access your [facility] medical records  [id number]         Blood Pressure from Last 3 Encounters:   07/03/18 139/78   01/10/18 136/79   08/25/17 146/66    Weight from Last 3 Encounters:   07/03/18 88.8 kg (195 lb 12.8 oz)   01/10/18 88.9 kg (196 lb)   08/25/17 88.5 kg (195 lb)              Today, you had the following     No orders found for display       Primary Care Provider Office Phone # Fax #    Kizzy Juliana Thomason [phone] [address] [facility] [address]        Equal Access to Services     MATILDE NUGENT : Hadii nic ku hadasho Soomaali, waaxda luqadaha, qaybta kaalmada adeegyada, lori barahona. So [facility] [phone].    ATENCIÓN: Si habla español, tiene a turner disposición servicios gratuitos de asistencia lingüística. Llame al [phone].    We comply with applicable federal civil rights laws and [place] laws. We do not discriminate on the basis of race, color, national origin, age, disability, sex, sexual orientation, or gender identity.            Thank you!     Thank you for choosing [facility]  for your care. Our goal is always to provide you with excellent care. Hearing back from our patients is one way we can continue to improve our services. Please take a few minutes to complete  the written survey that you may receive in the mail after your visit with us. Thank you!             Your Updated Medication List - Protect others around you: Learn how to safely use, store and throw away your medicines at www.disposemymeds.org.          This list is accurate as of 7/31/18  9:04 AM.  Always use your most recent med list.                   Brand Name Dispense Instructions for use Diagnosis    CALCIUM 600+D PO      2 tablet daily        lisinopril 2.5 MG tablet    PRINIVIL/Zestril    90 tablet    TAKE 1 TABLET (2.5 MG) BY MOUTH DAILY    Hypertension goal BP (blood pressure) < 140/90       MULTIVITAMIN GUMMIES ADULT PO           triamterene-hydrochlorothiazide 37.5-25 MG per capsule    DYAZIDE    90 capsule    Take 1 capsule by mouth every morning    Hypertension goal BP (blood pressure) < 140/90

## 2018-07-31 NOTE — LETTER
7/31/2018         RE: Marisol Orta  [address] [facility] [address]        Dear Colleague,    Thank you for referring your patient, Marisol Orta, to the [facility]. Please see a copy of my visit note below.    Chief Complaint   Patient presents with     COMPREHENSIVE EYE EXAM     yearly         Last Eye Exam: 7/2017  Dilated Previously: Yes    What are you currently using to see?  Glasses, wears them all of the time        Distance Vision Acuity: Satisfied with vision, no changes     Near Vision Acuity: Satisfied with vision while reading and using computer with glasses    Eye Comfort: good usually, did mention that it takes her eyes a few minutes to transition from near to far   Do you use eye drops? : No  Occupation or Hobbies: Retired     [employer]           Medical, surgical and family histories reviewed and updated 7/31/2018.      father had macular degeneration , daughter glaucoma      OBJECTIVE: See Ophthalmology exam    ASSESSMENT:    ICD-10-CM    1. Pseudophakia of both eyes Z96.1 EYE EXAM (SIMPLE-NONBILLABLE)     REFRACTION   2. Regular astigmatism of both eyes H52.223 EYE EXAM (SIMPLE-NONBILLABLE)     REFRACTION   3. Presbyopia H52.4 EYE EXAM (SIMPLE-NONBILLABLE)     REFRACTION   4. 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  Optional to fill new glasses prescription, minimal change  Return in 1 year for eye exam    Sindhu Leach O.D.  [facility]   [address] [place] [address] [phone]           Again, thank you for allowing me to participate in the care of your patient.        Sincerely,        Sindhu Leach, OD

## 2018-08-09 ENCOUNTER — TELEPHONE (OUTPATIENT)
Dept: OPTOMETRY | Facility: CLINIC | Age: 68
End: 2018-08-09

## 2018-08-09 NOTE — TELEPHONE ENCOUNTER
8/9/2018    Patient would like to talk to your egardin her glass rx. She had cataract 1 year ago. She is wondering if her precription changed from last exam and if it is normal for it to change after surgery?    Please call at [phone]

## 2018-08-10 NOTE — TELEPHONE ENCOUNTER
Reassured prescription change not due to implant or posterior capsular clouding. Discussed YAG. Change due  to corneal healing after 1 month post surgery refraction.    All questions answered. patient advised to call if any more questions.  Sindhu Leach, DAJUAN

## 2018-12-28 ENCOUNTER — DOCUMENTATION ONLY (OUTPATIENT)
Dept: FAMILY MEDICINE | Facility: CLINIC | Age: 68
End: 2018-12-28

## 2018-12-28 DIAGNOSIS — Z13.6 CARDIOVASCULAR SCREENING; LDL GOAL LESS THAN 160: ICD-10-CM

## 2018-12-28 DIAGNOSIS — I10 HYPERTENSION GOAL BP (BLOOD PRESSURE) < 140/90: Primary | ICD-10-CM

## 2018-12-28 NOTE — PROGRESS NOTES
Please review and sign Pending Pre-visit Labs in [employer]. Labs 01/02/2019 and BP check 01/08/19   Shirley BIRCH

## 2019-01-02 ENCOUNTER — DOCUMENTATION ONLY (OUTPATIENT)
Dept: OPHTHALMOLOGY | Facility: CLINIC | Age: 69
End: 2019-01-02

## 2019-01-02 DIAGNOSIS — I10 HYPERTENSION GOAL BP (BLOOD PRESSURE) < 140/90: ICD-10-CM

## 2019-01-02 DIAGNOSIS — Z13.6 CARDIOVASCULAR SCREENING; LDL GOAL LESS THAN 160: ICD-10-CM

## 2019-01-02 LAB
ANION GAP SERPL CALCULATED.3IONS-SCNC: 6 MMOL/L (ref 3–14)
BUN SERPL-MCNC: 13 MG/DL (ref 7–30)
CALCIUM SERPL-MCNC: 9.6 MG/DL (ref 8.5–10.1)
CHLORIDE SERPL-SCNC: 105 MMOL/L (ref 94–109)
CHOLEST SERPL-MCNC: 189 MG/DL
CO2 SERPL-SCNC: 31 MMOL/L (ref 20–32)
CREAT SERPL-MCNC: 0.7 MG/DL (ref 0.52–1.04)
GFR SERPL CREATININE-BSD FRML MDRD: 90 ML/MIN/{1.73_M2}
GLUCOSE SERPL-MCNC: 102 MG/DL (ref 70–99)
HDLC SERPL-MCNC: 73 MG/DL
LDLC SERPL CALC-MCNC: 90 MG/DL
NONHDLC SERPL-MCNC: 116 MG/DL
POTASSIUM SERPL-SCNC: 3.9 MMOL/L (ref 3.4–5.3)
SODIUM SERPL-SCNC: 142 MMOL/L (ref 133–144)
TRIGL SERPL-MCNC: 130 MG/DL

## 2019-01-02 PROCEDURE — 36415 COLL VENOUS BLD VENIPUNCTURE: CPT | Performed by: FAMILY MEDICINE

## 2019-01-02 PROCEDURE — 80061 LIPID PANEL: CPT | Performed by: FAMILY MEDICINE

## 2019-01-02 PROCEDURE — 80048 BASIC METABOLIC PNL TOTAL CA: CPT | Performed by: FAMILY MEDICINE

## 2019-01-02 NOTE — RESULT ENCOUNTER NOTE
Marisol,  Here are your lab results.  We will discuss these at your upcoming visit.   See you soon.  Kizzy Thomason MD

## 2019-01-04 NOTE — PROGRESS NOTES
SUBJECTIVE:   Marisol Orta is a 68 year old female who presents to clinic today for the following health issues:      History of Present Illness     Hypertension:     Outpatient blood pressures:  Are not being checked    Dietary sodium intake::  Not monitoring salt intake  Frequency of exercise:  4-5 days/week  Duration of exercise:  30-45 minutes  Taking medications regularly:  Yes  Medication side effects:  None  Additional concerns today:  No  Hypertension ROS: taking medications as instructed, no medication side effects noted, no TIA's, no chest pain on exertion, no dyspnea on exertion, no swelling of ankles.  No headache or visual changes.      Problem list and histories reviewed & adjusted, as indicated.  Additional history: as documented        Patient Active Problem List   Diagnosis     Back pain     Osteoarthritis     Hypertension goal BP (blood pressure) < 140/90     Vitreous membranes and strands     Advanced directives, counseling/discussion     CARDIOVASCULAR SCREENING; LDL GOAL LESS THAN 160     Combined form of age-related cataract, right eye     Pseudophakia of both eyes     Lumbar radiculopathy     PXF (pseudoexfoliation of lens capsule)     Past Surgical History:   Procedure Laterality Date     APPENDECTOMY       CATARACT IOL, RT/LT  2017    Dr Jiménez     COLONOSCOPY  2012?     FOOT SURGERY      left foot 1st  fusion     ORTHOPEDIC SURGERY       PHACOEMULSIFICATION CLEAR CORNEA WITH STANDARD INTRAOCULAR LENS IMPLANT Left 6/6/2017    Procedure: PHACOEMULSIFICATION CLEAR CORNEA WITH STANDARD INTRAOCULAR LENS IMPLANT;  LEFT EYE PHACOEMULSIFICATION CLEAR CORNEA WITH STANDARD INTRAOCULAR LENS IMPLANT ;  Surgeon: Caprice Jiménez MD;  [facility]     PHACOEMULSIFICATION CLEAR CORNEA WITH STANDARD INTRAOCULAR LENS IMPLANT Right 6/20/2017    Procedure: PHACOEMULSIFICATION CLEAR CORNEA WITH STANDARD INTRAOCULAR LENS IMPLANT;  RIGHT EYE PHACOEMULSIFICATION CLEAR CORNEA WITH STANDARD INTRAOCULAR  LENS IMPLANT;  Surgeon: Caprice Jiménez MD;  Location: [facility]       Social History     Tobacco Use     Smoking status: Never Smoker     Smokeless tobacco: Never Used     Tobacco comment: smoke free household   Substance Use Topics     Alcohol use: No     Alcohol/week: 0.0 oz     Family History   Problem Relation Age of Onset     Lipids Mother      Cancer - colorectal Mother 70     Hypertension Mother      Arthritis Mother      Cancer Mother 84        lung cancer      Thyroid Disease Mother      Colon Cancer Mother      Other Cancer Mother      Arthritis Father      Hypertension Father      Macular Degeneration Father 85        monitors visison with [employer] grid, more trouble reading     Depression Father      Anxiety Disorder Father      Diabetes Maternal Grandmother      Diabetes Paternal Grandmother      Asthma Brother      Depression Brother      Anxiety Disorder Brother      Thyroid Disease Brother      Hypertension Sister      Thyroid Disease Sister      Eye Disorder Son 25        keratoconus?     Glaucoma Daughter 18        montoring pressure     Glaucoma Paternal Aunt      Diabetes Paternal Aunt      Hypertension Sister      Thyroid Disease Sister      Depression Brother      Anxiety Disorder Brother      Asthma Brother      Thyroid Disease Brother      Cerebrovascular Disease No family hx of          Current Outpatient Medications   Medication Sig Dispense Refill     CALCIUM 600+D PO 2 tablet daily       lisinopril (PRINIVIL/ZESTRIL) 2.5 MG tablet TAKE 1 TABLET (2.5 MG) BY MOUTH DAILY 90 tablet 1     Multiple Vitamins-Minerals (MULTIVITAMIN GUMMIES ADULT PO)        triamterene-hydrochlorothiazide (DYAZIDE) 37.5-25 MG per capsule Take 1 capsule by mouth every morning 90 capsule 1     BP Readings from Last 3 Encounters:   01/08/19 128/74   07/03/18 139/78   01/10/18 136/79    Wt Readings from Last 3 Encounters:   01/08/19 88.1 kg (194 lb 3.2 oz)   07/03/18 88.8 kg (195 lb 12.8 oz)   01/10/18 88.9 kg (196 lb)  "                 Labs reviewed in EPIC    ROS:  Constitutional, HEENT, cardiovascular, pulmonary, gi and gu systems are negative, except as otherwise noted.    OBJECTIVE:     /74   Pulse 71   Temp 97.3  F (36.3  C) (Oral)   Resp 18   Ht 1.6 m (5' 3\")   Wt 88.1 kg (194 lb 3.2 oz)   SpO2 98%   BMI 34.40 kg/m    Body mass index is 34.4 kg/m .  GENERAL: healthy, alert and no distress  RESP: lungs clear to auscultation - no rales, rhonchi or wheezes  CV: regular rate and rhythm, normal S1 S2, no S3 or S4, no murmur, click or rub, no peripheral edema and peripheral pulses strong  MS: no gross musculoskeletal defects noted, no edema  SKIN: no suspicious lesions or rashes  PSYCH: mentation appears normal, affect normal/bright    Diagnostic Test Results:  none     ASSESSMENT/PLAN:     (I10) Hypertension goal BP (blood pressure) < 140/90  Comment: to goal  Plan: lisinopril (PRINIVIL/ZESTRIL) 2.5 MG tablet,         triamterene-HCTZ (DYAZIDE) 37.5-25 MG capsule         Refill x 6 months, f/u 6 months for OV and labs - wellness      See Patient Instructions    Kizzy Thomason MD  [facility]  "

## 2019-01-08 ENCOUNTER — OFFICE VISIT (OUTPATIENT)
Dept: FAMILY MEDICINE | Facility: CLINIC | Age: 69
End: 2019-01-08
Payer: MEDICARE

## 2019-01-08 VITALS
DIASTOLIC BLOOD PRESSURE: 74 MMHG | RESPIRATION RATE: 18 BRPM | WEIGHT: 194.2 LBS | HEIGHT: 63 IN | BODY MASS INDEX: 34.41 KG/M2 | HEART RATE: 71 BPM | SYSTOLIC BLOOD PRESSURE: 128 MMHG | OXYGEN SATURATION: 98 % | TEMPERATURE: 97.3 F

## 2019-01-08 DIAGNOSIS — I10 HYPERTENSION GOAL BP (BLOOD PRESSURE) < 140/90: ICD-10-CM

## 2019-01-08 PROCEDURE — 99213 OFFICE O/P EST LOW 20 MIN: CPT | Performed by: FAMILY MEDICINE

## 2019-01-08 RX ORDER — TRIAMTERENE AND HYDROCHLOROTHIAZIDE 37.5; 25 MG/1; MG/1
1 CAPSULE ORAL EVERY MORNING
Qty: 90 CAPSULE | Refills: 1 | Status: SHIPPED | OUTPATIENT
Start: 2019-01-08 | End: 2019-08-20

## 2019-01-08 RX ORDER — LISINOPRIL 2.5 MG/1
TABLET ORAL
Qty: 90 TABLET | Refills: 1 | Status: SHIPPED | OUTPATIENT
Start: 2019-01-08 | End: 2019-08-20

## 2019-01-08 ASSESSMENT — MIFFLIN-ST. JEOR: SCORE: 1390.02

## 2019-01-08 NOTE — NURSING NOTE
"Chief Complaint   Patient presents with     Hypertension       Initial /74   Pulse 71   Temp 97.3  F (36.3  C) (Oral)   Resp 18   Ht 1.6 m (5' 3\")   Wt 88.1 kg (194 lb 3.2 oz)   SpO2 98%   BMI 34.40 kg/m   Estimated body mass index is 34.4 kg/m  as calculated from the following:    Height as of this encounter: 1.6 m (5' 3\").    Weight as of this encounter: 88.1 kg (194 lb 3.2 oz).  Medication Reconciliation: complete  Felix Jones CMA    "

## 2019-07-16 ENCOUNTER — OFFICE VISIT (OUTPATIENT)
Dept: FAMILY MEDICINE | Facility: CLINIC | Age: 69
End: 2019-07-16
Payer: MEDICARE

## 2019-07-16 VITALS
OXYGEN SATURATION: 98 % | RESPIRATION RATE: 16 BRPM | HEART RATE: 74 BPM | DIASTOLIC BLOOD PRESSURE: 82 MMHG | TEMPERATURE: 98.7 F | SYSTOLIC BLOOD PRESSURE: 130 MMHG

## 2019-07-16 DIAGNOSIS — M54.50 ACUTE BILATERAL LOW BACK PAIN WITHOUT SCIATICA: Primary | ICD-10-CM

## 2019-07-16 PROCEDURE — 99214 OFFICE O/P EST MOD 30 MIN: CPT | Performed by: INTERNAL MEDICINE

## 2019-07-16 RX ORDER — CYCLOBENZAPRINE HCL 5 MG
5 TABLET ORAL 3 TIMES DAILY PRN
Qty: 20 TABLET | Refills: 0 | Status: SHIPPED | OUTPATIENT
Start: 2019-07-16 | End: 2019-08-13

## 2019-07-16 NOTE — PROGRESS NOTES
SUBJECTIVE:  Marisol Orta is an 69 year old female who presents for back pain.  Has h/o some back issues.  Was running backwards and tripped and fell landing on back about 10 days ago. Mid and low back have continued to be painful, and some pain radiating across lower back.  No chest pain, shortness of breath, lightheadedness, numbness, or weakness before or after the fall.  Took some aleve which helps but caused stomach upset so stopped.  Feels best when lying in a certain position.  Walking bothers back.  Sitting is not too painful, but increased pain when goes from sit to stand or stand to sit.  Sleeps okay at night except the past few nights when moves to recliner as it hurts to turn over in the night.  Feels better lying on back compared to sides.  No numbness or weakness or pain into legs.  No bruising or redness of back.  No h/o back surgery.  No bladder or bowel incontinence.    PMH:   has a past medical history of Arthritis, Back pain, HTN (hypertension), Hyperlipidemia LDL goal < 130, and Nonsenile cataract.  Patient Active Problem List   Diagnosis     Back pain     Osteoarthritis     Hypertension goal BP (blood pressure) < 140/90     Vitreous membranes and strands     Advanced directives, counseling/discussion     CARDIOVASCULAR SCREENING; LDL GOAL LESS THAN 160     Combined form of age-related cataract, right eye     Pseudophakia of both eyes     Lumbar radiculopathy     PXF (pseudoexfoliation of lens capsule)     Social History     Socioeconomic History     Marital status:      Spouse name: None     Number of children: None     Years of education: None     Highest education level: None   Occupational History     None   Social Needs     Financial resource strain: None     Food insecurity:     Worry: None     Inability: None     Transportation needs:     Medical: None     Non-medical: None   Tobacco Use     Smoking status: Never Smoker     Smokeless tobacco: Never Used     Tobacco comment: smoke  free household   Substance and Sexual Activity     Alcohol use: No     Alcohol/week: 0.0 oz     Drug use: No     Sexual activity: Yes     Partners: Male     Birth control/protection: Post-menopausal   Lifestyle     Physical activity:     Days per week: None     Minutes per session: None     Stress: None   Relationships     Social connections:     Talks on phone: None     Gets together: None     Attends Buddhism service: None     Active member of club or organization: None     Attends meetings of clubs or organizations: None     Relationship status: None     Intimate partner violence:     Fear of current or ex partner: None     Emotionally abused: None     Physically abused: None     Forced sexual activity: None   Other Topics Concern     Parent/sibling w/ CABG, MI or angioplasty before 65F 55M? No   Social History Narrative     None     Family History   Problem Relation Age of Onset     Lipids Mother      Cancer - colorectal Mother 70     Hypertension Mother      Arthritis Mother      Cancer Mother 84        lung cancer      Thyroid Disease Mother      Colon Cancer Mother      Other Cancer Mother      Arthritis Father      Hypertension Father      Macular Degeneration Father 85        monitors visison with [employer] grid, more trouble reading     Depression Father      Anxiety Disorder Father      Diabetes Maternal Grandmother      Diabetes Paternal Grandmother      Asthma Brother      Depression Brother      Anxiety Disorder Brother      Thyroid Disease Brother      Hypertension Sister      Thyroid Disease Sister      Eye Disorder Son 25        keratoconus?     Glaucoma Daughter 18        montoring pressure     Glaucoma Paternal Aunt      Diabetes Paternal Aunt      Hypertension Sister      Thyroid Disease Sister      Depression Brother      Anxiety Disorder Brother      Asthma Brother      Thyroid Disease Brother      Cerebrovascular Disease No family hx of        ALLERGIES:  Nkda [no known drug  allergies]    Current Outpatient Medications   Medication     CALCIUM 600+D PO     lisinopril (PRINIVIL/ZESTRIL) 2.5 MG tablet     Multiple Vitamins-Minerals (MULTIVITAMIN GUMMIES ADULT PO)     triamterene-HCTZ (DYAZIDE) 37.5-25 MG capsule     No current facility-administered medications for this visit.          ROS:  ROS is done and is negative for general/constitutional, eye, ENT, Respiratory, cardiovascular, GI, , Skin, musculoskeletal except as noted elsewhere.  All other review of systems negative except as noted elsewhere.      OBJECTIVE:  /82   Pulse 74   Temp 98.7  F (37.1  C) (Oral)   Resp 16   SpO2 98%   GENERAL APPEARANCE: Alert, in no acute distress  EYES: normal  NOSE:normal  OROPHARYNX:normal  NECK:No adenopathy,masses or thyromegaly  RESP: normal and clear to auscultation  CV:regular rate and rhythm and no murmurs, clicks, or gallops  ABDOMEN: Abdomen soft, non-tender. BS normal. No masses, organomegaly  SKIN: no ulcers, lesions or rash  MUSCULOSKELETAL:Musculoskeletal normal  BACK: mild muscle tightness diffusely across lower back which is non-tender to palpation.   no bruising, edema or erythema.  slr negative bilaterally.  Mildly limited rom in all directions due to pain.  No vertebral tenderness.  NEURO: Strength 5/5 and symmetric in bilateral lower extremities.  DTRs 2+ and symmetric in bilateral lower extremities.  Sensation to light touch grossly intact in bilateral lower extremities.    RESULTS  .  No results found for this or any previous visit (from the past 48 hour(s)).    ASSESSMENT/PLAN:    ASSESSMENT / PLAN:  (M54.5) Acute bilateral low back pain without sciatica  (primary encounter diagnosis)  Comment: currently c/w muscular etiology.  Currently no evidence of vertebral fracture, cauda equina syndrome, sciatica, or significant nerve involvement.  Plan: cyclobenzaprine (FLEXERIL) 5 MG tablet, REGGIE PT,        HAND, AND CHIROPRACTIC REFERRAL        Reviewed medication  instructions and side effects. Follow up if experiences side effects.. Advised that flexeril can make drowsy or altered and is not to drive, operate machinery or consume alcohol or street drugs when taking.  Refer to PT for further tx. I reviewed supportive care, otc meds to use if needed including acetaminophen since ibuprofen and naproxen bother her stomach, expected course, and signs of concern.  Follow up as needed or if she does not improve within 2 week(s) with PT or if worsens in any way.  Reviewed red flag symptoms and is to go to the ER if experiences any of these.      See [facility] for orders, medications, letters, patient instructions    Laurita Antoine M.D.

## 2019-07-31 ENCOUNTER — DOCUMENTATION ONLY (OUTPATIENT)
Dept: LAB | Facility: CLINIC | Age: 69
End: 2019-07-31

## 2019-07-31 DIAGNOSIS — I10 HYPERTENSION GOAL BP (BLOOD PRESSURE) < 140/90: Primary | ICD-10-CM

## 2019-07-31 DIAGNOSIS — Z00.00 ROUTINE HISTORY AND PHYSICAL EXAMINATION OF ADULT: ICD-10-CM

## 2019-07-31 NOTE — PROGRESS NOTES
Patient has a lab appointment on 8/12/2019. Per the lab schedule scrubbing protocol they are not due for anything. Please review chart and send orders or let patient know appointment is not needed.    Thank you,  Fela Gonsalez

## 2019-08-06 ENCOUNTER — THERAPY VISIT (OUTPATIENT)
Dept: PHYSICAL THERAPY | Facility: CLINIC | Age: 69
End: 2019-08-06
Attending: INTERNAL MEDICINE
Payer: MEDICARE

## 2019-08-06 DIAGNOSIS — M54.50 ACUTE BILATERAL LOW BACK PAIN WITHOUT SCIATICA: ICD-10-CM

## 2019-08-06 DIAGNOSIS — M54.50 ACUTE RIGHT-SIDED LOW BACK PAIN WITHOUT SCIATICA: ICD-10-CM

## 2019-08-06 PROCEDURE — 97110 THERAPEUTIC EXERCISES: CPT | Mod: GP | Performed by: PHYSICAL THERAPIST

## 2019-08-06 PROCEDURE — 97162 PT EVAL MOD COMPLEX 30 MIN: CPT | Mod: GP | Performed by: PHYSICAL THERAPIST

## 2019-08-06 NOTE — LETTER
DEPARTMENT OF HEALTH AND HUMAN SERVICES  CENTERS FOR MEDICARE & MEDICAID SERVICES    PLAN/UPDATED PLAN OF PROGRESS FOR OUTPATIENT REHABILITATION    PATIENTS NAME:  Marisol Orta   : 1950  PROVIDER NUMBER:    [id number]  HICN: [id number]   PROVIDER NAME: [facility]  MEDICAL RECORD NUMBER: [id number]   START OF CARE DATE:  SOC Date: 19   TYPE:  PT  PRIMARY/TREATMENT DIAGNOSIS: (Pertinent Medical Diagnosis)   Acute bilateral low back pain without sciatica  Acute right-sided low back pain without sciatica  VISITS FROM START OF CARE:  Rxs Used: 1     [place] for [facility] Initial Evaluation  Subjective:  The history is provided by the patient. No  was used.   Marisol Orta being seen for Back pain.   Problem began 2019. Where condition occurred: at home.Problem occurred: Running backwards  and reported as 4/10 on pain scale. General health as reported by patient is good. Pertinent medical history includes:  None.  Medical allergies: other. Other medical allergies details: None.  Surgeries include:  Other. Other surgery history details: Cataracts,  apendix, toe.  Current medications:  High blood pressure medication   Pain is described as stabbing and is intermittent. Pain is worse in the A.M.. Since onset symptoms are gradually improving. Special tests:  Other (N/A; consider if not progressing). Previous treatment includes physical therapy. There was significant improvement following previous treatment.   Patient is retired.   Barriers include:  None as reported by patient.  Red flags:  None as reported by patient.  Type of problem:  Lumbar   Condition occurred with:  A fall/slip. This is a new condition   Problem details: Pt states was seen in PT about two years ago and had pain in low back and down the leg.  Current presentation feels different and started about a month ago when she fell backward.  Pain initially was wrapping around her  "back to the belly.  However, remains sore at central low back and to the R side.  Referred to PT 2019.   Patient reports pain:  Central lumbar spine and lumbar spine right.   Exacerbated by: bending, lifting the legs, walking up stairs, standing. Relieved by: sitting, ice.  Pt states her goal is \"to do what I normally do.\"               Objective:  System     Lumbar/SI Evaluation  ROM:    Strength: TA MMT 1/5  Lumbar Myotomes:    T12-L3 (Hip Flex):  Left: 5    Right: 5  L2-4 (Quads):  Left:  5    Right:  5  L4 (Ankle DF):  Left:  5    Right:  5  Neural Tension/Mobility:    Left side:SLR or SLR w/DF  negative.   Right side:   SLR w/DF or SLR  negative.   Lumbar Palpation:  normal  PATIENTS NAME:  Marisol Orta   : 1950    Lumbar Provocation:    Left negative with:  PROM hip  Right negative with:  PROM hip  SI joint/Sacrum:    Negative Warren thigh thrustNIKI Lumbar Evaluation  Posture:  Sitting: fair  Standing: fair  Lordosis: Accentuated  Lateral Shift: no  Correction of Posture: better  Movement Loss:  Flexion (Flex): min  Extension (EXT): mod and pain  Side [place] R (SG R): nil  Side Glide L (SG L): nil  Test Movements:  FIS: During: no effect  After: no effect  Pretest Movements: central to R low back  Repeat FIS: During: decreases  After: no better    EIS: During: increases  After: no worse    Repeat EIS: During: increases  After: no worse    ANIKA: During: no effect  After: no effect    Repeat ANIKA: During: no effect  After: no effect    Conclusion: derangement  Principle of Treatment:  Flexion: in sitting                           Pt noted discomfort on sit to stand was markedly better after flexion in sitting.    Assessment/Plan:    Patient is a 69 year old female with lumbar complaints.    Patient has the following significant findings with corresponding treatment plan.                Diagnosis 1:  LBP  Pain -  hot/cold therapy  Decreased ROM/flexibility - manual therapy and " therapeutic exercise  Decreased strength - therapeutic exercise and therapeutic activities  Decreased function - therapeutic activities  Impaired posture - neuro re-education    Therapy Evaluation Codes:   1) History comprised of:   Personal factors that impact the plan of care:      Past/current experiences.    Comorbidity factors that impact the plan of care are:      None.     Medications impacting care: None.  2) Examination of Body Systems comprised of:   Body structures and functions that impact the plan of care:      Lumbar spine.   Activity limitations that impact the plan of care are:      Bending, Dressing, Stairs and Walking.  3) Clinical presentation characteristics are:   Evolving/Changing.  PATIENTS NAME:  Marisol Orta   : 1950    4) Decision-Making    Moderate complexity using standardized patient assessment instrument and/or measureable assessment of functional outcome.  Cumulative Therapy Evaluation is: Moderate complexity.  Previous and current functional limitations:  (See Goal Flow Sheet for this information)    Short term and Long term goals: (See Goal Flow Sheet for this information)   Communication ability:  Patient appears to be able to clearly communicate and understand verbal and written communication and follow directions correctly.  Treatment Explanation - The following has been discussed with the patient:   RX ordered/plan of care  Anticipated outcomes  Possible risks and side effects  This patient would benefit from PT intervention to resume normal activities.   Rehab potential is good.  Frequency:  1 X week, once daily  Duration:  for 6 weeks  Discharge Plan:  Achieve all LTG.  Independent in home treatment program.  Reach maximal therapeutic benefit.  Please refer to the daily flowsheet for treatment today, total treatment time and time spent performing 1:1 timed codes.         Caregiver Signature/Credentials _____________________________ Date ________      Treating Provider:  "Jerome Navarro, PT   I have reviewed and certified the need for these services and plan of treatment while under my care.        PHYSICIAN'S SIGNATURE:   _________________________________________  Date___________   Laurita Antoine M.D.    Certification period:  Beginning of Cert date period: 08/06/19 to  End of Cert period date: 11/03/19     Functional Level Progress Report: Please see attached \"Goal Flow sheet for Functional level.\"    ____X____ Continue Services or       ________ DC Services                Service dates: From  SOC Date: 08/06/19 date to present                         "

## 2019-08-06 NOTE — PROGRESS NOTES
"[facility] Initial Evaluation  Subjective:  The history is provided by the patient. No  was used.   Marisol Orta being seen for Back pain.   Problem began 7/6/2019. Where condition occurred: at home.Problem occurred: Running backwards  and reported as 4/10 on pain scale. General health as reported by patient is good. Pertinent medical history includes:  None.  Medical allergies: other. Other medical allergies details: None.  Surgeries include:  Other. Other surgery history details: Cataracts,  apendix, toe.  Current medications:  High blood pressure medication.     Pain is described as stabbing and is intermittent. Pain is worse in the A.M.. Since onset symptoms are gradually improving. Special tests:  Other (N/A; consider if not progressing). Previous treatment includes physical therapy. There was significant improvement following previous treatment.   Patient is retired.   Barriers include:  None as reported by patient.  Red flags:  None as reported by patient.  Type of problem:  Lumbar   Condition occurred with:  A fall/slip. This is a new condition   Problem details: Pt states was seen in PT about two years ago and had pain in low back and down the leg.  Current presentation feels different and started about a month ago when she fell backward.  Pain initially was wrapping around her back to the belly.  However, remains sore at central low back and to the R side.  Referred to PT 07/16/2019.   Patient reports pain:  Central lumbar spine and lumbar spine right.   Exacerbated by: bending, lifting the legs, walking up stairs, standing. Relieved by: sitting, ice.    Pt states her goal is \"to do what I normally do.\"                 Objective:  System         Lumbar/SI Evaluation  ROM:      Strength: TA MMT 1/5  Lumbar Myotomes:    T12-L3 (Hip Flex):  Left: 5    Right: 5  L2-4 (Quads):  Left:  5    Right:  5  L4 (Ankle DF):  Left:  5    Right:  5            Neural " Tension/Mobility:      Left side:SLR or SLR w/DF  negative.     Right side:   SLR w/DF or SLR  negative.   Lumbar Palpation:  normal        Lumbar Provocation:      Left negative with:  PROM hip    Right negative with:  PROM hip    SI joint/Sacrum:    Negative Warren, thigh thrustNIKI Lumbar Evaluation    Posture:  Sitting: fair  Standing: fair  Lordosis: Accentuated  Lateral Shift: no  Correction of Posture: better    Movement Loss:  Flexion (Flex): min  Extension (EXT): mod and pain  Side [place] R (SG R): nil  Side Glide L (SG L): nil  Test Movements:  FIS: During: no effect  After: no effect  Pretest Movements: central to R low back  Repeat FIS: During: decreases  After: no better    EIS: During: increases  After: no worse    Repeat EIS: During: increases  After: no worse    ANIKA: During: no effect  After: no effect    Repeat ANIKA: During: no effect  After: no effect            Conclusion: derangement  Principle of Treatment:    Flexion: in sitting                                         Pt noted discomfort on sit to stand was markedly better after flexion in sitting.    ROS    Assessment/Plan:    Patient is a 69 year old female with lumbar complaints.    Patient has the following significant findings with corresponding treatment plan.                Diagnosis 1:  LBP  Pain -  hot/cold therapy  Decreased ROM/flexibility - manual therapy and therapeutic exercise  Decreased strength - therapeutic exercise and therapeutic activities  Decreased function - therapeutic activities  Impaired posture - neuro re-education    Therapy Evaluation Codes:   1) History comprised of:   Personal factors that impact the plan of care:      Past/current experiences.    Comorbidity factors that impact the plan of care are:      None.     Medications impacting care: None.  2) Examination of Body Systems comprised of:   Body structures and functions that impact the  plan of care:      Lumbar spine.   Activity limitations that impact the plan of care are:      Bending, Dressing, Stairs and Walking.  3) Clinical presentation characteristics are:   Evolving/Changing.  4) Decision-Making    Moderate complexity using standardized patient assessment instrument and/or measureable assessment of functional outcome.  Cumulative Therapy Evaluation is: Moderate complexity.    Previous and current functional limitations:  (See Goal Flow Sheet for this information)    Short term and Long term goals: (See Goal Flow Sheet for this information)     Communication ability:  Patient appears to be able to clearly communicate and understand verbal and written communication and follow directions correctly.  Treatment Explanation - The following has been discussed with the patient:   RX ordered/plan of care  Anticipated outcomes  Possible risks and side effects  This patient would benefit from PT intervention to resume normal activities.   Rehab potential is good.    Frequency:  1 X week, once daily  Duration:  for 6 weeks  Discharge Plan:  Achieve all LTG.  Independent in home treatment program.  Reach maximal therapeutic benefit.    Please refer to the daily flowsheet for treatment today, total treatment time and time spent performing 1:1 timed codes.

## 2019-08-12 DIAGNOSIS — Z00.00 ROUTINE HISTORY AND PHYSICAL EXAMINATION OF ADULT: ICD-10-CM

## 2019-08-12 DIAGNOSIS — I10 HYPERTENSION GOAL BP (BLOOD PRESSURE) < 140/90: ICD-10-CM

## 2019-08-12 LAB
ANION GAP SERPL CALCULATED.3IONS-SCNC: 6 MMOL/L (ref 3–14)
BUN SERPL-MCNC: 12 MG/DL (ref 7–30)
CALCIUM SERPL-MCNC: 10 MG/DL (ref 8.5–10.1)
CHLORIDE SERPL-SCNC: 102 MMOL/L (ref 94–109)
CO2 SERPL-SCNC: 31 MMOL/L (ref 20–32)
CREAT SERPL-MCNC: 0.74 MG/DL (ref 0.52–1.04)
GFR SERPL CREATININE-BSD FRML MDRD: 84 ML/MIN/{1.73_M2}
GLUCOSE SERPL-MCNC: 106 MG/DL (ref 70–99)
POTASSIUM SERPL-SCNC: 3.4 MMOL/L (ref 3.4–5.3)
SODIUM SERPL-SCNC: 139 MMOL/L (ref 133–144)

## 2019-08-12 PROCEDURE — 80048 BASIC METABOLIC PNL TOTAL CA: CPT | Performed by: PHYSICIAN ASSISTANT

## 2019-08-12 PROCEDURE — 36415 COLL VENOUS BLD VENIPUNCTURE: CPT | Performed by: PHYSICIAN ASSISTANT

## 2019-08-13 ENCOUNTER — OFFICE VISIT (OUTPATIENT)
Dept: OPHTHALMOLOGY | Facility: CLINIC | Age: 69
End: 2019-08-13
Payer: MEDICARE

## 2019-08-13 ENCOUNTER — THERAPY VISIT (OUTPATIENT)
Dept: PHYSICAL THERAPY | Facility: CLINIC | Age: 69
End: 2019-08-13
Payer: MEDICARE

## 2019-08-13 DIAGNOSIS — H04.123 DRY EYE SYNDROME, BILATERAL: ICD-10-CM

## 2019-08-13 DIAGNOSIS — Z96.1 PSEUDOPHAKIA: Primary | ICD-10-CM

## 2019-08-13 DIAGNOSIS — M54.50 ACUTE RIGHT-SIDED LOW BACK PAIN WITHOUT SCIATICA: ICD-10-CM

## 2019-08-13 DIAGNOSIS — H52.4 PRESBYOPIA: ICD-10-CM

## 2019-08-13 DIAGNOSIS — H52.223 REGULAR ASTIGMATISM OF BOTH EYES: ICD-10-CM

## 2019-08-13 PROCEDURE — 92015 DETERMINE REFRACTIVE STATE: CPT | Mod: GY | Performed by: STUDENT IN AN ORGANIZED HEALTH CARE EDUCATION/TRAINING PROGRAM

## 2019-08-13 PROCEDURE — 97110 THERAPEUTIC EXERCISES: CPT | Mod: GP | Performed by: PHYSICAL THERAPIST

## 2019-08-13 PROCEDURE — 92014 COMPRE OPH EXAM EST PT 1/>: CPT | Performed by: STUDENT IN AN ORGANIZED HEALTH CARE EDUCATION/TRAINING PROGRAM

## 2019-08-13 PROCEDURE — 97530 THERAPEUTIC ACTIVITIES: CPT | Mod: GP | Performed by: PHYSICAL THERAPIST

## 2019-08-13 ASSESSMENT — VISUAL ACUITY
METHOD: SNELLEN - LINEAR
OD_CC: 20/20
OS_CC: 20/20
OD_CC+: -1
CORRECTION_TYPE: GLASSES

## 2019-08-13 ASSESSMENT — REFRACTION_MANIFEST
OS_AXIS: 115
OD_AXIS: 040
OD_ADD: +2.75
OS_CYLINDER: +1.00
OD_CYLINDER: +1.00
OS_ADD: +2.75
OS_SPHERE: -0.50
OD_SPHERE: -0.50

## 2019-08-13 ASSESSMENT — CONF VISUAL FIELD
OS_NORMAL: 1
OD_NORMAL: 1
METHOD: COUNTING FINGERS

## 2019-08-13 ASSESSMENT — REFRACTION_WEARINGRX
SPECS_TYPE: PAL
OD_AXIS: 055
OD_ADD: +3.00
OS_ADD: +3.00
OS_SPHERE: -0.75
OD_SPHERE: -0.50
OD_CYLINDER: +1.25
OS_AXIS: 113
OS_CYLINDER: +1.25

## 2019-08-13 ASSESSMENT — TONOMETRY
OS_IOP_MMHG: 16
IOP_METHOD: APPLANATION
OD_IOP_MMHG: 16

## 2019-08-13 ASSESSMENT — SLIT LAMP EXAM - LIDS
COMMENTS: NORMAL
COMMENTS: NORMAL

## 2019-08-13 ASSESSMENT — EXTERNAL EXAM - RIGHT EYE: OD_EXAM: NORMAL

## 2019-08-13 ASSESSMENT — CUP TO DISC RATIO
OD_RATIO: 0.2
OS_RATIO: 0.2

## 2019-08-13 ASSESSMENT — EXTERNAL EXAM - LEFT EYE: OS_EXAM: NORMAL

## 2019-08-13 NOTE — LETTER
"    8/13/2019         RE: Marisol Orta  [address] [facility] [address]        Dear Colleague,    Thank you for referring your patient, Marisol Orta, to the [facility]. Please see a copy of my visit note below.     Current Eye Medications:  none     Subjective:  Complete eye exam. Vision is down a little in distance both eyes. Worse after reading for a while, then trying to look in distance. No eye pain or discomfort in either eye.        Objective:  See Ophthalmology Exam.      Assessment:  Marisol Orta is a 69 year old female who presents with:   Encounter Diagnoses   Name Primary?     Pseudophakia - Both Eyes      Dry eye syndrome, bilateral      Plan:  Use artificial tears up to four times a day (like Refresh Optive, Systane Balance, TheraTears, or generic artificial tears are ok. Avoid \"get the red out\" drops).     Glasses prescription given - optional to update    Caprice Jiménez MD  [phone]               Again, thank you for allowing me to participate in the care of your patient.        Sincerely,        Caprice Jiménez MD    "

## 2019-08-13 NOTE — PROGRESS NOTES
" Current Eye Medications:  none     Subjective:  Complete eye exam. Vision is down a little in distance both eyes. Worse after reading for a while, then trying to look in distance. No eye pain or discomfort in either eye.        Objective:  See Ophthalmology Exam.      Assessment:  Marisol Orta is a 69 year old female who presents with:   Encounter Diagnoses   Name Primary?     Pseudophakia - Both Eyes      Dry eye syndrome, bilateral      Plan:  Use artificial tears up to four times a day (like Refresh Optive, Systane Balance, TheraTears, or generic artificial tears are ok. Avoid \"get the red out\" drops).     Glasses prescription given - optional to update    Caprice Jiménez MD  [phone]             "

## 2019-08-13 NOTE — PATIENT INSTRUCTIONS
"Use artificial tears up to four times a day (like Refresh Optive, Systane Balance, TheraTears, or generic artificial tears are ok. Avoid \"get the red out\" drops).     Glasses prescription given - optional to update    Caprice Jiménez MD  [phone]    "

## 2019-08-16 NOTE — PATIENT INSTRUCTIONS
You will be contacted to schedule your colonoscopy    Keep all medications the same.    Reschedule wellness exam before end of year.

## 2019-08-19 ENCOUNTER — TELEPHONE (OUTPATIENT)
Dept: FAMILY MEDICINE | Facility: CLINIC | Age: 69
End: 2019-08-19

## 2019-08-19 ASSESSMENT — ENCOUNTER SYMPTOMS
ARTHRALGIAS: 1
HEMATOCHEZIA: 1
ABDOMINAL PAIN: 1
BREAST MASS: 0

## 2019-08-19 ASSESSMENT — ACTIVITIES OF DAILY LIVING (ADL): CURRENT_FUNCTION: NO ASSISTANCE NEEDED

## 2019-08-19 NOTE — PROGRESS NOTES
"SUBJECTIVE:   Marisol Orta is a 69 year old female who presents for Preventive Visit.      Are you in the first 12 months of your Medicare coverage?  No    Healthy Habits:     In general, how would you rate your overall health?  Good    Frequency of exercise:  4-5 days/week    Duration of exercise:  30-45 minutes    Do you usually eat at least 4 servings of fruit and vegetables a day, include whole grains    & fiber and avoid regularly eating high fat or \"junk\" foods?  Yes    Taking medications regularly:  Yes    Medication side effects:  None    Ability to successfully perform activities of daily living:  No assistance needed    Home Safety:  No safety concerns identified    Hearing Impairment:  No hearing concerns    In the past 6 months, have you been bothered by leaking of urine?  No    In general, how would you rate your overall mental or emotional health?  Good      PHQ-2 Total Score: 0    Additional concerns today:  Yes    Do you feel safe in your environment? { :521684}    Do you have a Health Care Directive? { :997382}    {Hearing Test Done (Optional):147337}  Fall risk  { :173185}  {If any of the above assessments are answered yes, consider ordering appropriate referrals (Optional):431315::\"click delete button to remove this line now\"}  Cognitive Screening { :168325}    {Do you have sleep apnea, excessive snoring or daytime drowsiness? (Optional):867100}    Reviewed and updated as needed this visit by clinical staff         Reviewed and updated as needed this visit by Provider        Social History     Tobacco Use     Smoking status: Never Smoker     Smokeless tobacco: Never Used     Tobacco comment: smoke free household   Substance Use Topics     Alcohol use: No     Alcohol/week: 0.0 oz         Alcohol Use 8/19/2019   Prescreen: >3 drinks/day or >7 drinks/week? No   Prescreen: >3 drinks/day or >7 drinks/week? -       {Outside tests to abstract? :615811}    {additional problems to add " "(Optional):446120}    Current providers sharing in care for this patient include: {Rooming staff:  Please update Care Team in Rooming Activity, refresh this note and then delete this statement}  Patient Care Team:  Kizzy Thomason MD as PCP - General (Family Practice)  Kizzy Thomason MD as Assigned PCP    The following health maintenance items are reviewed in Epic and correct as of today:  Health Maintenance   Topic Date Due     ZOSTER IMMUNIZATION (2 of 3) 12/03/2012     FALL RISK ASSESSMENT  07/03/2019     INFLUENZA VACCINE (1) 09/01/2019     MAMMO SCREENING  12/15/2019     BMP  02/12/2020     DTAP/TDAP/TD IMMUNIZATION (2 - Td) 06/02/2020     EYE EXAM  08/13/2020     MEDICARE ANNUAL WELLNESS VISIT  08/20/2020     ADVANCE CARE PLANNING  07/27/2021     COLONOSCOPY  05/16/2022     LIPID  01/02/2024     DEXA  Completed     HEPATITIS C SCREENING  Completed     PHQ-2  Completed     PNEUMOCOCCAL IMMUNIZATION 65+ LOW/MEDIUM RISK  Completed     IPV IMMUNIZATION  Aged Out     MENINGITIS IMMUNIZATION  Aged Out     {Chronicprobdata (optional):001923}  {Decision Support (Optional):422217}    Review of Systems   Gastrointestinal: Positive for abdominal pain and hematochezia.   Breasts:  Negative for tenderness, breast mass and discharge.   Genitourinary: Positive for pelvic pain. Negative for vaginal bleeding and vaginal discharge.   Musculoskeletal: Positive for arthralgias.     {ROS COMP (Optional):028553}    OBJECTIVE:   There were no vitals taken for this visit. Estimated body mass index is 34.4 kg/m  as calculated from the following:    Height as of 1/8/19: 1.6 m (5' 3\").    Weight as of 1/8/19: 88.1 kg (194 lb 3.2 oz).  Physical Exam  {Exam (Optional) :734442}    {Diagnostic Test Results (Optional):462848::\"Diagnostic Test Results:\",\"Labs reviewed in Epic\"}    ASSESSMENT / PLAN:   {Diag Picklist:572171}    End of Life Planning:  Patient currently has an advanced directive: { :589520}    COUNSELING:  {Medicare " "Counselin}    Estimated body mass index is 34.4 kg/m  as calculated from the following:    Height as of 19: 1.6 m (5' 3\").    Weight as of 19: 88.1 kg (194 lb 3.2 oz).    {Weight Management Plan (ACO) Complete if BMI is abnormal-  Ages 18-64  BMI >24.9.  Age 65+ with BMI <23 or >30 (Optional):467896}     reports that she has never smoked. She has never used smokeless tobacco.  {Tobacco Cessation -- Complete if patient is a smoker (Optional):750381}    Appropriate preventive services were discussed with this patient, including applicable screening as appropriate for cardiovascular disease, diabetes, osteopenia/osteoporosis, and glaucoma.  As appropriate for age/gender, discussed screening for colorectal cancer, prostate cancer, breast cancer, and cervical cancer. Checklist reviewing preventive services available has been given to the patient.    Reviewed patients plan of care and provided an AVS. The {CarePlan:880037} for Marisol meets the Care Plan requirement. This Care Plan has been established and reviewed with the {PATIENT, FAMILY MEMBER, CAREGIVER:853188}.    Counseling Resources:  ATP IV Guidelines  Pooled Cohorts Equation Calculator  Breast Cancer Risk Calculator  FRAX Risk Assessment  ICSI Preventive Guidelines  Dietary Guidelines for Americans,   USDA's MyPlate  ASA Prophylaxis  Lung CA Screening    Kizzy Thomason MD  [facility]    Identified Health Risks:  "

## 2019-08-19 NOTE — TELEPHONE ENCOUNTER
Next 5 appointments (look out 90 days)    Aug 20, 2019  7:55 AM CDT  Office Visit with Kiz[facility] Thomason MD  [facility] [address] [facility] [address] [phone]        Patient has appointment tomorrow, 9 days ago, had several episodes of diarrhea which resolved, then had several episodes of pressure to go to bathroom and then passed 2 large clots.  Has  Not reoccurred since.   Would like to have physical.   In addition, Patient reports hurt her back several mos ago, will have abdominal pain, is seeing physical therapy for this.  See 7/16/19, office visit.  This pain is still ongoing.    Please advise on office visit type.   Chioma Vega RN

## 2019-08-19 NOTE — TELEPHONE ENCOUNTER
Information below is reviewed with  Dr Kizzy Thomason, Verbal Orders this will need to be an acute visit.    Patient/parent verbalized understanding of instructions provided and agreed with the plan of care    Per protocol, will route encounter to be cosigned by provider for Verbal Orders.  Chioma Vega RN

## 2019-08-19 NOTE — TELEPHONE ENCOUNTER
Patient scheduled with Dr. Thomason for 8/20/19.  Patient originally scheduled as physical but appears to have called back and changed appointment to concerns with blood in her stool.    Per Dr. Thomason please call the patient and assess if symptoms are ok to wait for appointment.      Felix Jones, [facility]

## 2019-08-19 NOTE — PROGRESS NOTES
Subjective     Marisol Orta is a 69 year old female who presents to clinic today for the following health issues:    HPI   Blood in stool  See triage note dated 8/19/19.  Patient noted diarrhea 1 week ago which she states will often flair up her hemorrhoid.  She noted blood in her stool.   Sunday and Monday  She states she was passing large blood clots with bowel movements.   Unsure if all was clot or clot covered stool. No blood in stools since Tuesday.    Family history of mother having colon cancer.  Last colonoscopy 5/16/12.  No history of abnormal colonoscopy.  Mother was 74 when diagnosed with colon cancer.    Review of colonoscopy report shows no polyps, + diverticulosis.     Back pain recheck,  Had fall 7/6/19.  Back pain wrapped around to abdomen.   Recheck from Dr. Antoine appointment 7/16/19.  Scheduled for PT today.  Doing well and feeling that her back is nearly back to baseline.    Hypertension ROS: taking medications as instructed, no medication side effects noted, no TIA's, no chest pain on exertion, no dyspnea on exertion, no swelling of ankles.  No headache or visual changes.           Patient Active Problem List   Diagnosis     Back pain     Osteoarthritis     Hypertension goal BP (blood pressure) < 140/90     Vitreous membranes and strands     Advanced directives, counseling/discussion     CARDIOVASCULAR SCREENING; LDL GOAL LESS THAN 160     Combined form of age-related cataract, right eye     Pseudophakia of both eyes     Lumbar radiculopathy     PXF (pseudoexfoliation of lens capsule)     Acute right-sided low back pain without sciatica     Past Surgical History:   Procedure Laterality Date     APPENDECTOMY       CATARACT IOL, RT/LT  2017    Dr Jiménez     COLONOSCOPY  2012?     FOOT SURGERY      left foot 1st  fusion     ORTHOPEDIC SURGERY       PHACOEMULSIFICATION CLEAR CORNEA WITH STANDARD INTRAOCULAR LENS IMPLANT Left 6/6/2017    Procedure: PHACOEMULSIFICATION CLEAR CORNEA WITH STANDARD  INTRAOCULAR LENS IMPLANT;  LEFT EYE PHACOEMULSIFICATION CLEAR CORNEA WITH STANDARD INTRAOCULAR LENS IMPLANT ;  Surgeon: Caprice Jiménez MD;  Location: [facility]     PHACOEMULSIFICATION CLEAR CORNEA WITH STANDARD INTRAOCULAR LENS IMPLANT Right 6/20/2017    Procedure: PHACOEMULSIFICATION CLEAR CORNEA WITH STANDARD INTRAOCULAR LENS IMPLANT;  RIGHT EYE PHACOEMULSIFICATION CLEAR CORNEA WITH STANDARD INTRAOCULAR LENS IMPLANT;  Surgeon: Caprice Jiménez MD;  Location: [facility]       Social History     Tobacco Use     Smoking status: Never Smoker     Smokeless tobacco: Never Used     Tobacco comment: smoke free household   Substance Use Topics     Alcohol use: No     Alcohol/week: 0.0 oz     Family History   Problem Relation Age of Onset     Lipids Mother      Cancer - colorectal Mother 70     Hypertension Mother      Arthritis Mother      Cancer Mother 84        lung cancer      Thyroid Disease Mother      Colon Cancer Mother      Other Cancer Mother      Arthritis Father      Hypertension Father      Macular Degeneration Father 85        monitors visison with [employer] grid, more trouble reading     Depression Father      Anxiety Disorder Father      Diabetes Maternal Grandmother      Diabetes Paternal Grandmother      Asthma Brother      Depression Brother      Anxiety Disorder Brother      Thyroid Disease Brother      Hypertension Sister      Thyroid Disease Sister      Eye Disorder Son 25        keratoconus?     Glaucoma Daughter 18        montoring pressure     Glaucoma Paternal Aunt      Diabetes Paternal Aunt      Hypertension Sister      Thyroid Disease Sister      Depression Brother      Anxiety Disorder Brother      Asthma Brother      Thyroid Disease Brother      Cerebrovascular Disease No family hx of          Current Outpatient Medications   Medication Sig Dispense Refill     CALCIUM 600+D PO 2 tablet daily       lisinopril (PRINIVIL/ZESTRIL) 2.5 MG tablet TAKE 1 TABLET (2.5 MG) BY MOUTH DAILY 90 tablet 1      Multiple Vitamins-Minerals (MULTIVITAMIN GUMMIES ADULT PO)        triamterene-HCTZ (DYAZIDE) 37.5-25 MG capsule Take 1 capsule by mouth every morning 90 capsule 1     BP Readings from Last 3 Encounters:   08/20/19 136/78   07/16/19 130/82   01/08/19 128/74    Wt Readings from Last 3 Encounters:   08/20/19 85.9 kg (189 lb 6.4 oz)   01/08/19 88.1 kg (194 lb 3.2 oz)   07/03/18 88.8 kg (195 lb 12.8 oz)                    Reviewed and updated as needed this visit by Provider  Tobacco  Allergies  Meds  Problems  Med Hx  Surg Hx  Fam Hx         Review of Systems   ROS COMP: Constitutional, HEENT, cardiovascular, pulmonary, gi and gu systems are negative, except as otherwise noted.      Objective    /78   Pulse 80   Temp 98.1  F (36.7  C) (Oral)   Resp 18   Wt 85.9 kg (189 lb 6.4 oz)   BMI 33.55 kg/m    Body mass index is 33.55 kg/m .  Physical Exam   GENERAL: healthy, alert and no distress  EYES: Eyes grossly normal to inspection, PERRL and conjunctivae and sclerae normal  RESP: lungs clear to auscultation - no rales, rhonchi or wheezes  CV: regular rate and rhythm, normal S1 S2, no S3 or S4, no murmur, click or rub, no peripheral edema and peripheral pulses strong  ABDOMEN: soft, nontender, no hepatosplenomegaly, no masses and bowel sounds normal  MS: no gross musculoskeletal defects noted, no edema  SKIN: no suspicious lesions or rashes  PSYCH: mentation appears normal, affect normal/bright    Diagnostic Test Results:  Labs reviewed in Epic        Assessment & Plan     (K92.1) Blood in stool  (primary encounter diagnosis)  (Z80.0) Family history of colon cancer  Comment: possible bleeding from divertic disease due to inflammation from diarrhea  Plan: CBC with platelets, GASTROENTEROLOGY ADULT REF         PROCEDURE ONLY [facility] [phone];         [facility]        Cannot r/o other pathology, refer for colonoscopy      (I10) Hypertension goal BP (blood pressure) <  140/90  Comment: to goal  Plan: lisinopril (PRINIVIL/ZESTRIL) 2.5 MG tablet,         triamterene-HCTZ (DYAZIDE) 37.5-25 MG capsule         Refill x 6 months f/u 4 months for OV for wellness exam, labs already completed         Patient Instructions     You will be contacted to schedule your colonoscopy    Keep all medications the same.    Reschedule wellness exam before end of year.       Return in about 4 months (around 12/20/2019) for wellness exam, no labs needed.    Kizzy Thomason MD  [facility]

## 2019-08-19 NOTE — TELEPHONE ENCOUNTER
Next 5 appointments (look out 90 days)    Aug 20, 2019  7:55 AM CDT  Office Visit with Kizzy Thomason MD  [facility] ([facility] [address] [facility] [address] [phone]        Left message on answering machine for patient/parent to call back.   [phone].  Chioma Vega RN

## 2019-08-20 ENCOUNTER — THERAPY VISIT (OUTPATIENT)
Dept: PHYSICAL THERAPY | Facility: CLINIC | Age: 69
End: 2019-08-20
Payer: MEDICARE

## 2019-08-20 ENCOUNTER — OFFICE VISIT (OUTPATIENT)
Dept: FAMILY MEDICINE | Facility: CLINIC | Age: 69
End: 2019-08-20
Payer: MEDICARE

## 2019-08-20 VITALS
HEART RATE: 80 BPM | BODY MASS INDEX: 33.55 KG/M2 | WEIGHT: 189.4 LBS | SYSTOLIC BLOOD PRESSURE: 136 MMHG | DIASTOLIC BLOOD PRESSURE: 78 MMHG | RESPIRATION RATE: 18 BRPM | TEMPERATURE: 98.1 F

## 2019-08-20 DIAGNOSIS — M54.50 ACUTE RIGHT-SIDED LOW BACK PAIN WITHOUT SCIATICA: ICD-10-CM

## 2019-08-20 DIAGNOSIS — Z80.0 FAMILY HISTORY OF COLON CANCER: ICD-10-CM

## 2019-08-20 DIAGNOSIS — K92.1 BLOOD IN STOOL: Primary | ICD-10-CM

## 2019-08-20 DIAGNOSIS — I10 HYPERTENSION GOAL BP (BLOOD PRESSURE) < 140/90: ICD-10-CM

## 2019-08-20 LAB
ERYTHROCYTE [DISTWIDTH] IN BLOOD BY AUTOMATED COUNT: 12.8 % (ref 10–15)
HCT VFR BLD AUTO: 39.1 % (ref 35–47)
HGB BLD-MCNC: 13.5 G/DL (ref 11.7–15.7)
MCH RBC QN AUTO: 31 PG (ref 26.5–33)
MCHC RBC AUTO-ENTMCNC: 34.5 G/DL (ref 31.5–36.5)
MCV RBC AUTO: 90 FL (ref 78–100)
PLATELET # BLD AUTO: 225 10E9/L (ref 150–450)
RBC # BLD AUTO: 4.35 10E12/L (ref 3.8–5.2)
WBC # BLD AUTO: 3.9 10E9/L (ref 4–11)

## 2019-08-20 PROCEDURE — 97110 THERAPEUTIC EXERCISES: CPT | Mod: GP | Performed by: PHYSICAL THERAPIST

## 2019-08-20 PROCEDURE — 36415 COLL VENOUS BLD VENIPUNCTURE: CPT | Performed by: FAMILY MEDICINE

## 2019-08-20 PROCEDURE — 99214 OFFICE O/P EST MOD 30 MIN: CPT | Performed by: FAMILY MEDICINE

## 2019-08-20 PROCEDURE — 85027 COMPLETE CBC AUTOMATED: CPT | Performed by: FAMILY MEDICINE

## 2019-08-20 RX ORDER — TRIAMTERENE AND HYDROCHLOROTHIAZIDE 37.5; 25 MG/1; MG/1
1 CAPSULE ORAL EVERY MORNING
Qty: 90 CAPSULE | Refills: 1 | Status: SHIPPED | OUTPATIENT
Start: 2019-08-20 | End: 2020-03-10

## 2019-08-20 RX ORDER — LISINOPRIL 2.5 MG/1
TABLET ORAL
Qty: 90 TABLET | Refills: 1 | Status: SHIPPED | OUTPATIENT
Start: 2019-08-20 | End: 2020-03-10

## 2019-08-20 ASSESSMENT — PAIN SCALES - GENERAL: PAINLEVEL: NO PAIN (0)

## 2019-08-20 NOTE — PROGRESS NOTES
"Subjective:  HPI  Oswestry Score: 14 %                 Objective:  System    Physical Exam    General     ROS    Assessment/Plan:    DISCHARGE REPORT    Progress reporting period is from 08/06/2019 to 08/20/2019.       SUBJECTIVE  Subjective: Pt reports she is feeling \"really good.\"  Much easier to move the leg.  Back continues to feel good as well.  Sleeping well and \"I can turn again without pain.\"    Current Pain level: 0/10.     Initial Pain level: 4/10.   Changes in function:  Yes (See Goal flowsheet attached for changes in current functional level)  Adverse reaction to treatment or activity: None    OBJECTIVE  Objective: No discomfort standing lumbar flexion.  Discomfort with standing extension that did not change with repetition and pt reported was \"only a little.\"  TA MMT 2/5.  No discomfort resisted R hip flexion.     ASSESSMENT/PLAN  Updated problem list and treatment plan: Diagnosis 1:  Back, hip pain -- home program  STG/LTGs have been met or progress has been made towards goals:  Yes (See Goal flow sheet completed today.)  Assessment of Progress: The patient has met all of their long term goals.  Self Management Plans:  Patient is independent in a home treatment program.  I have re-evaluated this patient and find that the nature, scope, duration and intensity of the therapy is appropriate for the medical condition of the patient.  Marisol continues to require the following intervention to meet STG and LTG's:  PT intervention is no longer required to meet STG/LTG.    Recommendations:  Given progress, pt agrees discharge to [facility] but will let me know if there are further issues.    Please refer to the daily flowsheet for treatment today, total treatment time and time spent performing 1:1 timed codes.          "

## 2019-08-20 NOTE — NURSING NOTE
"Chief Complaint   Patient presents with     Rectal Problem       Initial /78   Pulse 80   Temp 98.1  F (36.7  C) (Oral)   Resp 18   Wt 85.9 kg (189 lb 6.4 oz)   BMI 33.55 kg/m   Estimated body mass index is 33.55 kg/m  as calculated from the following:    Height as of 1/8/19: 1.6 m (5' 3\").    Weight as of this encounter: 85.9 kg (189 lb 6.4 oz).  Medication Reconciliation: complete  Felix Jones CMA    "

## 2019-09-12 ENCOUNTER — HOSPITAL ENCOUNTER (OUTPATIENT)
Facility: AMBULATORY SURGERY CENTER | Age: 69
Discharge: HOME | End: 2019-09-12
Attending: SURGERY | Admitting: SURGERY
Payer: MEDICARE

## 2019-09-12 VITALS
DIASTOLIC BLOOD PRESSURE: 71 MMHG | RESPIRATION RATE: 16 BRPM | HEART RATE: 79 BPM | SYSTOLIC BLOOD PRESSURE: 111 MMHG | TEMPERATURE: 97.1 F | OXYGEN SATURATION: 96 %

## 2019-09-12 LAB — COLONOSCOPY: NORMAL

## 2019-09-12 PROCEDURE — 45378 DIAGNOSTIC COLONOSCOPY: CPT

## 2019-09-12 PROCEDURE — G8918 PT W/O PREOP ORDER IV AB PRO: HCPCS

## 2019-09-12 PROCEDURE — G8907 PT DOC NO EVENTS ON DISCHARG: HCPCS

## 2019-09-12 PROCEDURE — 45378 DIAGNOSTIC COLONOSCOPY: CPT | Performed by: SURGERY

## 2019-09-12 PROCEDURE — G0500 MOD SEDAT ENDO SERVICE >5YRS: HCPCS | Performed by: SURGERY

## 2019-09-12 RX ORDER — FLUMAZENIL 0.1 MG/ML
0.2 INJECTION, SOLUTION INTRAVENOUS
Status: CANCELLED | OUTPATIENT
Start: 2019-09-12 | End: 2019-09-12

## 2019-09-12 RX ORDER — LIDOCAINE 40 MG/G
CREAM TOPICAL
Status: DISCONTINUED | OUTPATIENT
Start: 2019-09-12 | End: 2019-09-13 | Stop reason: HOSPADM

## 2019-09-12 RX ORDER — ONDANSETRON 2 MG/ML
4 INJECTION INTRAMUSCULAR; INTRAVENOUS EVERY 6 HOURS PRN
Status: CANCELLED | OUTPATIENT
Start: 2019-09-12

## 2019-09-12 RX ORDER — FENTANYL CITRATE 50 UG/ML
INJECTION, SOLUTION INTRAMUSCULAR; INTRAVENOUS PRN
Status: DISCONTINUED | OUTPATIENT
Start: 2019-09-12 | End: 2019-09-12 | Stop reason: HOSPADM

## 2019-09-12 RX ORDER — NALOXONE HYDROCHLORIDE 0.4 MG/ML
.1-.4 INJECTION, SOLUTION INTRAMUSCULAR; INTRAVENOUS; SUBCUTANEOUS
Status: CANCELLED | OUTPATIENT
Start: 2019-09-12 | End: 2019-09-13

## 2019-09-12 RX ORDER — ONDANSETRON 4 MG/1
4 TABLET, ORALLY DISINTEGRATING ORAL EVERY 6 HOURS PRN
Status: CANCELLED | OUTPATIENT
Start: 2019-09-12

## 2020-02-24 ENCOUNTER — HEALTH MAINTENANCE LETTER (OUTPATIENT)
Age: 70
End: 2020-02-24

## 2020-02-27 ENCOUNTER — DOCUMENTATION ONLY (OUTPATIENT)
Dept: LAB | Facility: CLINIC | Age: 70
End: 2020-02-27

## 2020-02-27 DIAGNOSIS — I10 HYPERTENSION GOAL BP (BLOOD PRESSURE) < 140/90: Primary | ICD-10-CM

## 2020-02-27 NOTE — PROGRESS NOTES
Please review lab orders sign and close encounter.  Labs on 03/03/2020 and Physical on 03/10/2020.  Ena Shell TC

## 2020-03-02 NOTE — PATIENT INSTRUCTIONS
Patient Education   Personalized Prevention Plan  You are due for the preventive services outlined below.  Your care team is available to assist you in scheduling these services.  If you have already completed any of these items, please share that information with your care team to update in your medical record.  Health Maintenance Due   Topic Date Due     Zoster (Shingles) Vaccine (2 of 3) 12/03/2012     Annual Wellness Visit  07/03/2019     Mammogram  12/15/2019     PHQ-2  01/01/2020     Basic Metabolic Panel  02/12/2020         Continue all meds

## 2020-03-02 NOTE — PROGRESS NOTES
"SUBJECTIVE:   Marisol Orta is a 69 year old female who presents for Preventive Visit.      Are you in the first 12 months of your Medicare coverage?  No    Healthy Habits:     In general, how would you rate your overall health?  Good    Frequency of exercise:  4-5 days/week    Duration of exercise:  30-45 minutes    Do you usually eat at least 4 servings of fruit and vegetables a day, include whole grains    & fiber and avoid regularly eating high fat or \"junk\" foods?  Yes    Taking medications regularly:  Yes    Medication side effects:  None    Ability to successfully perform activities of daily living:  No assistance needed    Home Safety:  No safety concerns identified    Hearing Impairment:  No hearing concerns    In the past 6 months, have you been bothered by leaking of urine?  No    In general, how would you rate your overall mental or emotional health?  Good      PHQ-2 Total Score: 0    Additional concerns today:  Yes    Do you feel safe in your environment? Yes    Have you ever done Advance Care Planning? (For example, a Health Directive, POLST, or a discussion with a medical provider or your loved ones about your wishes): Has information at home       Fall risk  Fallen 2 or more times in the past year?: No  Any fall with injury in the past year?: Yes    Cognitive Screening   1) Repeat 3 items (Leader, Season, Table)    2) Clock draw: NORMAL  3) 3 item recall: Recalls 3 objects  Results: NORMAL clock, 1-2 items recalled: COGNITIVE IMPAIRMENT LESS LIKELY    Mini-CogTM Copyright ANA Daniel. Licensed by the author for use in [facility]; reprinted with permission (kei@.[facility]). All rights reserved.      Do you have sleep apnea, excessive snoring or daytime drowsiness?: no    Reviewed and updated as needed this visit by clinical staff  Tobacco  Allergies  Meds  Med Hx  Surg Hx  Fam Hx  Soc Hx        Reviewed and updated as needed this visit by Provider        Social History     Tobacco Use "     Smoking status: Never Smoker     Smokeless tobacco: Never Used     Tobacco comment: smoke free household   Substance Use Topics     Alcohol use: No     Alcohol/week: 0.0 standard drinks         Alcohol Use 3/7/2020   Prescreen: >3 drinks/day or >7 drinks/week? No   Prescreen: >3 drinks/day or >7 drinks/week? -               Current providers sharing in care for this patient include:   Patient Care Team:  Kizzy Thomason MD as PCP - General (Family Practice)  Kizzy Thomason MD as Assigned PCP    The following health maintenance items are reviewed in Epic and correct as of today:  Health Maintenance   Topic Date Due     ZOSTER IMMUNIZATION (2 of 3) 2012     MEDICARE ANNUAL WELLNESS VISIT  2019     MAMMO SCREENING  12/15/2019     DTAP/TDAP/TD IMMUNIZATION (2 - Td) 2020     EYE EXAM  2020     FALL RISK ASSESSMENT  2020     BMP  2020     ADVANCE CARE PLANNING  2021     LIPID  2024     COLORECTAL CANCER SCREENING  2024     DEXA  Completed     HEPATITIS C SCREENING  Completed     PHQ-2  Completed     INFLUENZA VACCINE  Completed     PNEUMOCOCCAL IMMUNIZATION 65+ LOW/MEDIUM RISK  Completed     IPV IMMUNIZATION  Aged Out     MENINGITIS IMMUNIZATION  Aged Out     G 2 P 2   No LMP recorded. Patient is postmenopausal.     Fasting: No   Td: tdap        NO - age 65 - see link Cervical Cytology Screening Guidelines               Cholesterol:   Lab Results   Component Value Date    CHOL 189 2019     Lab Results   Component Value Date    HDL 73 2019     Lab Results   Component Value Date    LDL 90 2019     Lab Results   Component Value Date    TRIG 130 2019     Lab Results   Component Value Date    CHOLHDLRATIO 2.6 2014         MM/17  Dexa:       Flex/colo:       Seat Belt: Yes    Sunscreen use: Yes   Calcium Intake: adeq  Health Care Directive: Yes   Sexually Active: Yes     Current contraception: none  History of  abnormal Pap smear: No  Family history of colon/breast/ovarian cancer: Yes: see [facility]  Regular self breast exam: No  History of abnormal mammogram: No      Labs reviewed in EPIC  BP Readings from Last 3 Encounters:   03/10/20 137/79   09/12/19 111/71   08/20/19 136/78    Wt Readings from Last 3 Encounters:   03/10/20 85.5 kg (188 lb 6.4 oz)   08/20/19 85.9 kg (189 lb 6.4 oz)   01/08/19 88.1 kg (194 lb 3.2 oz)                  Patient Active Problem List   Diagnosis     Back pain     Osteoarthritis     Hypertension goal BP (blood pressure) < 140/90     Vitreous membranes and strands     Advanced directives, counseling/discussion     CARDIOVASCULAR SCREENING; LDL GOAL LESS THAN 160     Combined form of age-related cataract, right eye     Pseudophakia of both eyes     Lumbar radiculopathy     PXF (pseudoexfoliation of lens capsule)     Past Surgical History:   Procedure Laterality Date     APPENDECTOMY       CATARACT IOL, RT/LT  2017    Dr Jiménez     COLONOSCOPY  2012?     COLONOSCOPY WITH CO2 INSUFFLATION N/A 9/12/2019    Procedure: COLONOSCOPY, WITH CO2 INSUFFLATION;  Surgeon: Jose Preston MD;  Location: [facility]     FOOT SURGERY      left foot 1st  fusion     ORTHOPEDIC SURGERY       PHACOEMULSIFICATION CLEAR CORNEA WITH STANDARD INTRAOCULAR LENS IMPLANT Left 6/6/2017    Procedure: PHACOEMULSIFICATION CLEAR CORNEA WITH STANDARD INTRAOCULAR LENS IMPLANT;  LEFT EYE PHACOEMULSIFICATION CLEAR CORNEA WITH STANDARD INTRAOCULAR LENS IMPLANT ;  Surgeon: Caprice Jiménez MD;  Location: [facility]     PHACOEMULSIFICATION CLEAR CORNEA WITH STANDARD INTRAOCULAR LENS IMPLANT Right 6/20/2017    Procedure: PHACOEMULSIFICATION CLEAR CORNEA WITH STANDARD INTRAOCULAR LENS IMPLANT;  RIGHT EYE PHACOEMULSIFICATION CLEAR CORNEA WITH STANDARD INTRAOCULAR LENS IMPLANT;  Surgeon: Caprice Jiménez MD;  Location: [facility]       Social History     Tobacco Use     Smoking status: Never Smoker     Smokeless tobacco: Never Used     Tobacco  comment: smoke free household   Substance Use Topics     Alcohol use: No     Alcohol/week: 0.0 standard drinks     Family History   Problem Relation Age of Onset     Lipids Mother      Cancer - colorectal Mother 70     Hypertension Mother      Arthritis Mother      Cancer Mother 84        lung cancer      Thyroid Disease Mother      Colon Cancer Mother      Other Cancer Mother      Arthritis Father      Hypertension Father      Macular Degeneration Father 85        monitors visison with [employer] grid, more trouble reading     Depression Father      Anxiety Disorder Father      Diabetes Maternal Grandmother      Diabetes Paternal Grandmother      Asthma Brother      Depression Brother      Anxiety Disorder Brother      Thyroid Disease Brother      Hypertension Sister      Thyroid Disease Sister      Eye Disorder Son 25        keratoconus?     Glaucoma Daughter 18        montoring pressure     Glaucoma Paternal Aunt      Diabetes Paternal Aunt      Hypertension Sister      Thyroid Disease Sister      Depression Brother      Anxiety Disorder Brother      Asthma Brother      Thyroid Disease Brother      Cerebrovascular Disease No family hx of          Current Outpatient Medications   Medication Sig Dispense Refill     CALCIUM 600+D PO 2 tablet daily       lisinopril (ZESTRIL) 2.5 MG tablet TAKE 1 TABLET (2.5 MG) BY MOUTH DAILY 90 tablet 1     Multiple Vitamins-Minerals (MULTIVITAMIN GUMMIES ADULT PO)        triamterene-HCTZ (DYAZIDE) 37.5-25 MG capsule Take 1 capsule by mouth every morning 90 capsule 1     Pneumonia Vaccine:completed  Mammogram Screening: Mammogram Screening: Patient over age 50, mutual decision to screen reflected in health maintenance.  Shingrix: recommended  Flu: 10/19    Review of Systems   Constitutional: Negative for chills and fever.   HENT: Negative for congestion, ear pain, hearing loss and sore throat.    Eyes: Negative for pain and visual disturbance.   Respiratory: Negative for cough and  "shortness of breath.    Cardiovascular: Negative for chest pain, palpitations and peripheral edema.   Gastrointestinal: Negative for abdominal pain, constipation, diarrhea, heartburn, hematochezia and nausea.   Breasts:  Negative for tenderness, breast mass and discharge.   Genitourinary: Negative for dysuria, frequency, genital sores, hematuria, pelvic pain, urgency, vaginal bleeding and vaginal discharge.   Musculoskeletal: Positive for arthralgias and joint swelling. Negative for myalgias.   Skin: Negative for rash.   Neurological: Negative for dizziness, weakness, headaches and paresthesias.   Psychiatric/Behavioral: Negative for mood changes. The patient is not nervous/anxious.          OBJECTIVE:   /79   Pulse 74   Temp 97.8  F (36.6  C) (Oral)   Resp 16   Ht 1.6 m (5' 3\")   Wt 85.5 kg (188 lb 6.4 oz)   BMI 33.37 kg/m   Estimated body mass index is 33.37 kg/m  as calculated from the following:    Height as of this encounter: 1.6 m (5' 3\").    Weight as of this encounter: 85.5 kg (188 lb 6.4 oz).  Physical Exam  GENERAL APPEARANCE: healthy, alert and no distress  EYES: Eyes grossly normal to inspection, PERRL and conjunctivae and sclerae normal  HENT: ear canals and TM's normal, nose and mouth without ulcers or lesions, oropharynx clear and oral mucous membranes moist  NECK: no adenopathy, no asymmetry, masses, or scars and thyroid normal to palpation  RESP: lungs clear to auscultation - no rales, rhonchi or wheezes  BREAST: normal without masses, tenderness or nipple discharge and no palpable axillary masses or adenopathy  CV: regular rate and rhythm, normal S1 S2, no S3 or S4, no murmur, click or rub, no peripheral edema and peripheral pulses strong  ABDOMEN: soft, nontender, no hepatosplenomegaly, no masses and bowel sounds normal  MS: no musculoskeletal defects are noted and gait is age appropriate without ataxia  SKIN: no suspicious lesions or rashes  NEURO: Normal strength and tone, sensory " "exam grossly normal, mentation intact and speech normal  PSYCH: mentation appears normal and affect normal/bright    Diagnostic Test Results:  Labs reviewed in Epic    ASSESSMENT / PLAN:   (Z00.00) Encounter for Medicare annual wellness exam  (primary encounter diagnosis)  Comment: preventive needs reviewed   Plan: see orders in Epic.     (I10) Hypertension goal BP (blood pressure) < 140/90  Comment: to goal  Plan: lisinopril (ZESTRIL) 2.5 MG tablet,         triamterene-HCTZ (DYAZIDE) 37.5-25 MG capsule         Refill x 6 months f/u 6 months for pharm bp and labs     (L65.9) Hair loss  Comment: increased over crown of head  Plan: will consider seeing derm, will call if desires referral    (Z12.31) Encounter for screening mammogram for breast cancer  Comment: due  Plan: MA SCREENING DIGITAL BILAT - Future  (s+30)              COUNSELING:  Reviewed preventive health counseling, as reflected in patient instructions  Special attention given to:       Regular exercise       Healthy diet/nutrition    Estimated body mass index is 33.37 kg/m  as calculated from the following:    Height as of this encounter: 1.6 m (5' 3\").    Weight as of this encounter: 85.5 kg (188 lb 6.4 oz).    Weight management plan: Discussed healthy diet and exercise guidelines     reports that she has never smoked. She has never used smokeless tobacco.      Appropriate preventive services were discussed with this patient, including applicable screening as appropriate for cardiovascular disease, diabetes, osteopenia/osteoporosis, and glaucoma.  As appropriate for age/gender, discussed screening for colorectal cancer, prostate cancer, breast cancer, and cervical cancer. Checklist reviewing preventive services available has been given to the patient.    Reviewed patients plan of care and provided an AVS. The Basic Care Plan (routine screening as documented in Health Maintenance) for Marisol meets the Care Plan requirement. This Care Plan has been " established and reviewed with the Patient.    Counseling Resources:  ATP IV Guidelines  Pooled Cohorts Equation Calculator  Breast Cancer Risk Calculator  FRAX Risk Assessment  ICSI Preventive Guidelines  Dietary Guidelines for Americans, 2010  USDA's MyPlate  ASA Prophylaxis  Lung CA Screening    Kizzy Thomason MD  [facility]    Identified Health Risks:

## 2020-03-03 DIAGNOSIS — I10 HYPERTENSION GOAL BP (BLOOD PRESSURE) < 140/90: ICD-10-CM

## 2020-03-03 LAB
ANION GAP SERPL CALCULATED.3IONS-SCNC: 5 MMOL/L (ref 3–14)
BUN SERPL-MCNC: 14 MG/DL (ref 7–30)
CALCIUM SERPL-MCNC: 9.6 MG/DL (ref 8.5–10.1)
CHLORIDE SERPL-SCNC: 104 MMOL/L (ref 94–109)
CO2 SERPL-SCNC: 30 MMOL/L (ref 20–32)
CREAT SERPL-MCNC: 0.83 MG/DL (ref 0.52–1.04)
GFR SERPL CREATININE-BSD FRML MDRD: 73 ML/MIN/{1.73_M2}
GLUCOSE SERPL-MCNC: 104 MG/DL (ref 70–99)
POTASSIUM SERPL-SCNC: 3.5 MMOL/L (ref 3.4–5.3)
SODIUM SERPL-SCNC: 139 MMOL/L (ref 133–144)

## 2020-03-03 PROCEDURE — 80048 BASIC METABOLIC PNL TOTAL CA: CPT | Performed by: FAMILY MEDICINE

## 2020-03-03 PROCEDURE — 36415 COLL VENOUS BLD VENIPUNCTURE: CPT | Performed by: FAMILY MEDICINE

## 2020-03-07 ASSESSMENT — ACTIVITIES OF DAILY LIVING (ADL): CURRENT_FUNCTION: NO ASSISTANCE NEEDED

## 2020-03-07 ASSESSMENT — ENCOUNTER SYMPTOMS
HEMATOCHEZIA: 0
SHORTNESS OF BREATH: 0
WEAKNESS: 0
MYALGIAS: 0
PALPITATIONS: 0
HEADACHES: 0
HEARTBURN: 0
CONSTIPATION: 0
PARESTHESIAS: 0
BREAST MASS: 0
SORE THROAT: 0
FEVER: 0
DIZZINESS: 0
JOINT SWELLING: 1
NAUSEA: 0
DIARRHEA: 0
CHILLS: 0
FREQUENCY: 0
HEMATURIA: 0
COUGH: 0
EYE PAIN: 0
NERVOUS/ANXIOUS: 0
ARTHRALGIAS: 1
DYSURIA: 0
ABDOMINAL PAIN: 0

## 2020-03-09 ASSESSMENT — ENCOUNTER SYMPTOMS
ABDOMINAL PAIN: 0
PALPITATIONS: 0
HEARTBURN: 0
HEMATURIA: 0
DIARRHEA: 0
JOINT SWELLING: 1
BREAST MASS: 0
CONSTIPATION: 0
DYSURIA: 0
NAUSEA: 0
COUGH: 0
PARESTHESIAS: 0
MYALGIAS: 0
DIZZINESS: 0
EYE PAIN: 0
FEVER: 0
SHORTNESS OF BREATH: 0
WEAKNESS: 0
ARTHRALGIAS: 1
FREQUENCY: 0
HEADACHES: 0
HEMATOCHEZIA: 0
SORE THROAT: 0
CHILLS: 0
NERVOUS/ANXIOUS: 0

## 2020-03-09 ASSESSMENT — ACTIVITIES OF DAILY LIVING (ADL): CURRENT_FUNCTION: NO ASSISTANCE NEEDED

## 2020-03-10 ENCOUNTER — OFFICE VISIT (OUTPATIENT)
Dept: FAMILY MEDICINE | Facility: CLINIC | Age: 70
End: 2020-03-10
Payer: MEDICARE

## 2020-03-10 VITALS
BODY MASS INDEX: 33.38 KG/M2 | TEMPERATURE: 97.8 F | DIASTOLIC BLOOD PRESSURE: 79 MMHG | HEIGHT: 63 IN | SYSTOLIC BLOOD PRESSURE: 137 MMHG | RESPIRATION RATE: 16 BRPM | HEART RATE: 74 BPM | WEIGHT: 188.4 LBS

## 2020-03-10 DIAGNOSIS — I10 HYPERTENSION GOAL BP (BLOOD PRESSURE) < 140/90: ICD-10-CM

## 2020-03-10 DIAGNOSIS — Z12.31 ENCOUNTER FOR SCREENING MAMMOGRAM FOR BREAST CANCER: ICD-10-CM

## 2020-03-10 DIAGNOSIS — L65.9 HAIR LOSS: ICD-10-CM

## 2020-03-10 DIAGNOSIS — Z00.00 ENCOUNTER FOR MEDICARE ANNUAL WELLNESS EXAM: Primary | ICD-10-CM

## 2020-03-10 PROCEDURE — G0439 PPPS, SUBSEQ VISIT: HCPCS | Performed by: FAMILY MEDICINE

## 2020-03-10 RX ORDER — LISINOPRIL 2.5 MG/1
TABLET ORAL
Qty: 90 TABLET | Refills: 1 | Status: SHIPPED | OUTPATIENT
Start: 2020-03-10 | End: 2020-09-14

## 2020-03-10 RX ORDER — TRIAMTERENE AND HYDROCHLOROTHIAZIDE 37.5; 25 MG/1; MG/1
1 CAPSULE ORAL EVERY MORNING
Qty: 90 CAPSULE | Refills: 1 | Status: SHIPPED | OUTPATIENT
Start: 2020-03-10 | End: 2020-09-14

## 2020-03-10 ASSESSMENT — MIFFLIN-ST. JEOR: SCORE: 1358.71

## 2020-03-10 NOTE — NURSING NOTE
"Chief Complaint   Patient presents with     Wellness Visit       Initial /79   Pulse 74   Temp 97.8  F (36.6  C) (Oral)   Resp 16   Ht 1.6 m (5' 3\")   Wt 85.5 kg (188 lb 6.4 oz)   BMI 33.37 kg/m   Estimated body mass index is 33.37 kg/m  as calculated from the following:    Height as of this encounter: 1.6 m (5' 3\").    Weight as of this encounter: 85.5 kg (188 lb 6.4 oz).  Medication Reconciliation: complete  Felix Jones CMA    "

## 2020-03-12 ENCOUNTER — ANCILLARY PROCEDURE (OUTPATIENT)
Dept: MAMMOGRAPHY | Facility: CLINIC | Age: 70
End: 2020-03-12
Attending: FAMILY MEDICINE
Payer: MEDICARE

## 2020-03-12 DIAGNOSIS — Z12.31 ENCOUNTER FOR SCREENING MAMMOGRAM FOR BREAST CANCER: ICD-10-CM

## 2020-03-12 PROCEDURE — 77063 BREAST TOMOSYNTHESIS BI: CPT | Mod: TC

## 2020-03-12 PROCEDURE — 77067 SCR MAMMO BI INCL CAD: CPT | Mod: TC

## 2020-08-18 ENCOUNTER — OFFICE VISIT (OUTPATIENT)
Dept: OPTOMETRY | Facility: CLINIC | Age: 70
End: 2020-08-18
Payer: MEDICARE

## 2020-08-18 DIAGNOSIS — H52.4 PRESBYOPIA: ICD-10-CM

## 2020-08-18 DIAGNOSIS — Z96.1 PSEUDOPHAKIA OF BOTH EYES: Primary | ICD-10-CM

## 2020-08-18 DIAGNOSIS — H04.123 DRY EYES, BILATERAL: ICD-10-CM

## 2020-08-18 DIAGNOSIS — H26.8 PXF (PSEUDOEXFOLIATION OF LENS CAPSULE): ICD-10-CM

## 2020-08-18 DIAGNOSIS — H52.223 REGULAR ASTIGMATISM OF BOTH EYES: ICD-10-CM

## 2020-08-18 PROCEDURE — 92015 DETERMINE REFRACTIVE STATE: CPT | Mod: GY | Performed by: OPTOMETRIST

## 2020-08-18 PROCEDURE — 92014 COMPRE OPH EXAM EST PT 1/>: CPT | Performed by: OPTOMETRIST

## 2020-08-18 ASSESSMENT — TONOMETRY
IOP_METHOD: APPLANATION
OD_IOP_MMHG: 17
OS_IOP_MMHG: 13

## 2020-08-18 ASSESSMENT — REFRACTION_MANIFEST
OD_ADD: +2.75
OS_SPHERE: -0.50
OD_AXIS: 045
OD_CYLINDER: +1.25
OD_AXIS: 043
OS_CYLINDER: +1.25
METHOD_AUTOREFRACTION: 1
OS_ADD: +2.75
OD_SPHERE: -0.50
OD_SPHERE: -0.75
OS_SPHERE: -0.25
OS_AXIS: 115
OS_AXIS: 119
OS_CYLINDER: +1.00
OD_CYLINDER: +1.00

## 2020-08-18 ASSESSMENT — KERATOMETRY
OS_K1POWER_DIOPTERS: 42.25
OS_K2POWER_DIOPTERS: 43.50
OD_K1POWER_DIOPTERS: 42.00
OD_AXISANGLE2_DEGREES: 138
OD_K2POWER_DIOPTERS: 43.25
OS_AXISANGLE2_DEGREES: 22

## 2020-08-18 ASSESSMENT — SLIT LAMP EXAM - LIDS
COMMENTS: NORMAL
COMMENTS: NORMAL

## 2020-08-18 ASSESSMENT — CUP TO DISC RATIO
OS_RATIO: 0.2
OD_RATIO: 0.2

## 2020-08-18 ASSESSMENT — REFRACTION_WEARINGRX
OS_SPHERE: -0.50
OD_SPHERE: -0.25
OD_ADD: +3.00
OS_CYLINDER: +1.00
OD_AXIS: 052
OS_ADD: +3.00
OS_AXIS: 105
SPECS_TYPE: PAL
OD_CYLINDER: +0.75

## 2020-08-18 ASSESSMENT — VISUAL ACUITY
OD_CC: 20/20
OD_CC: 20/20
OS_CC: 20/25-1
OS_CC: 20/20
OD_CC+: -3
METHOD: SNELLEN - LINEAR
CORRECTION_TYPE: GLASSES

## 2020-08-18 ASSESSMENT — CONF VISUAL FIELD
OS_NORMAL: 1
OD_NORMAL: 1
METHOD: COUNTING FINGERS

## 2020-08-18 ASSESSMENT — PACHYMETRY
OS_CT(UM): .601
OD_CT(UM): .605

## 2020-08-18 ASSESSMENT — EXTERNAL EXAM - LEFT EYE: OS_EXAM: NORMAL

## 2020-08-18 ASSESSMENT — EXTERNAL EXAM - RIGHT EYE: OD_EXAM: NORMAL

## 2020-08-18 NOTE — PATIENT INSTRUCTIONS
Patient was advised of today's exam findings.  Fill glasses prescription  Continue use of artificial tears 2-3 times a day   Return in 1 year for eye exam    Sindhu Leach O.D.  [facility]   [address] [place] [address] [phone]

## 2020-08-18 NOTE — PROGRESS NOTES
Chief Complaint   Patient presents with     Annual Eye Exam        Last Eye Exam: 8/13/2019 with Dr Jiménez  Dilated Previously: Yes    What are you currently using to see?  glasses       Distance Vision Acuity: Noticed gradual change in both eyes, things not as clear     Near Vision Acuity: Satisfied with vision while reading and using computer with glasses, not good without the glasses , takes time to refocus in distance after reading a while     Eye Comfort: dry  Do you use eye drops? : Yes: Uses Equate, it's like Refresh - 2 x a day mostly - seems to help  Occupation or Hobbies: Retired     [employer]           Medical, surgical and family histories reviewed and updated 8/18/2020.       OBJECTIVE: See Ophthalmology exam    ASSESSMENT:    ICD-10-CM    1. Pseudophakia of both eyes  Z96.1 EYE EXAM (SIMPLE-NONBILLABLE)     REFRACTION   2. PXF (pseudoexfoliation of lens capsule)  H26.8 EYE EXAM (SIMPLE-NONBILLABLE)     REFRACTION   3. Dry eyes, bilateral  H04.123 EYE EXAM (SIMPLE-NONBILLABLE)     REFRACTION   4. Presbyopia  H52.4 EYE EXAM (SIMPLE-NONBILLABLE)     REFRACTION   5. Regular astigmatism of both eyes  H52.223 EYE EXAM (SIMPLE-NONBILLABLE)     REFRACTION      PLAN:     Patient Instructions   Patient was advised of today's exam findings.  Fill glasses prescription  Continue use of artificial tears 2-3 times a day   Return in 1 year for eye exam    Sindhu Leach O.D.  [facility]   [address] [address], [place] [id number] [phone]

## 2020-09-14 ENCOUNTER — VIRTUAL VISIT (OUTPATIENT)
Dept: FAMILY MEDICINE | Facility: CLINIC | Age: 70
End: 2020-09-14
Payer: MEDICARE

## 2020-09-14 DIAGNOSIS — I10 HYPERTENSION GOAL BP (BLOOD PRESSURE) < 140/90: ICD-10-CM

## 2020-09-14 PROCEDURE — 99441 ZZC PHYSICIAN TELEPHONE EVALUATION 5-10 MIN: CPT | Mod: 95 | Performed by: FAMILY MEDICINE

## 2020-09-14 RX ORDER — TRIAMTERENE AND HYDROCHLOROTHIAZIDE 37.5; 25 MG/1; MG/1
1 CAPSULE ORAL EVERY MORNING
Qty: 90 CAPSULE | Refills: 1 | Status: SHIPPED | OUTPATIENT
Start: 2020-09-14 | End: 2021-03-31

## 2020-09-14 RX ORDER — LISINOPRIL 2.5 MG/1
TABLET ORAL
Qty: 90 TABLET | Refills: 1 | Status: SHIPPED | OUTPATIENT
Start: 2020-09-14 | End: 2020-11-05

## 2020-09-14 NOTE — PATIENT INSTRUCTIONS
Stop lisinopril and monitor blood pressure at home. If lower than 140/90 most of the time, ok to just take water pill.      Get new blood pressure cuff checked for accuracy.      Make non-fasting lab appointment within 2 weeks.

## 2020-09-14 NOTE — PROGRESS NOTES
"Marisol Orta is a 70 year old female who is being evaluated via a billable telephone visit.      The patient has been notified of following:     \"This telephone visit will be conducted via a call between you and your physician/provider. We have found that certain health care needs can be provided without the need for a physical exam.  This service lets us provide the care you need with a short phone conversation.  If a prescription is necessary we can send it directly to your pharmacy.  If lab work is needed we can place an order for that and you can then stop by our lab to have the test done at a later time.    Telephone visits are billed at different rates depending on your insurance coverage. During this emergency period, for some insurers they may be billed the same as an in-person visit.  Please reach out to your insurance provider with any questions.    If during the course of the call the physician/provider feels a telephone visit is not appropriate, you will not be charged for this service.\"    Patient has given verbal consent for Telephone visit?  Yes    What phone number would you like to be contacted at? [phone]    How would you like to obtain your AVS? Jimyhart    Subjective     Marisol Orta is a 70 year old female who presents via phone visit today for the following health issues:    HPI    Hypertension Follow-up      Do you check your blood pressure regularly outside of the clinic? Yes  Checking recently for visit.        Are you following a low salt diet? No    Are your blood pressures ever more than 140 on the top number (systolic) OR more   than 90 on the bottom number (diastolic), for example 140/90? No  One reading 146/87      Hypertension ROS: taking medications as instructed, no medication side effects noted, no TIA's, no chest pain on exertion, no dyspnea on exertion, no swelling of ankles.  No headache or visual changes.    How many days per week do you miss taking your medication? 2-3 " times per month     What makes it hard for you to take your medications?  Going somewhere and not wanting to have to use the bathroom     Walking more, has lost 25#.  Keeping it off and feeling well.      Labs reviewed in EPIC  BP Readings from Last 3 Encounters:   03/10/20 137/79   09/12/19 111/71   08/20/19 136/78    Wt Readings from Last 3 Encounters:   03/10/20 85.5 kg (188 lb 6.4 oz)   08/20/19 85.9 kg (189 lb 6.4 oz)   01/08/19 88.1 kg (194 lb 3.2 oz)                  Patient Active Problem List   Diagnosis     Back pain     Osteoarthritis     Hypertension goal BP (blood pressure) < 140/90     Vitreous membranes and strands     Advanced directives, counseling/discussion     CARDIOVASCULAR SCREENING; LDL GOAL LESS THAN 160     Combined form of age-related cataract, right eye     Pseudophakia of both eyes     Lumbar radiculopathy     PXF (pseudoexfoliation of lens capsule)     Past Surgical History:   Procedure Laterality Date     APPENDECTOMY       CATARACT IOL, RT/LT  2017    Dr Jiménez     COLONOSCOPY  2012?     COLONOSCOPY WITH CO2 INSUFFLATION N/A 9/12/2019    Procedure: COLONOSCOPY, WITH CO2 INSUFFLATION;  Surgeon: Jose Preston MD;  Location: [facility]     FOOT SURGERY      left foot 1st  fusion     ORTHOPEDIC SURGERY       PHACOEMULSIFICATION CLEAR CORNEA WITH STANDARD INTRAOCULAR LENS IMPLANT Left 6/6/2017    Procedure: PHACOEMULSIFICATION CLEAR CORNEA WITH STANDARD INTRAOCULAR LENS IMPLANT;  LEFT EYE PHACOEMULSIFICATION CLEAR CORNEA WITH STANDARD INTRAOCULAR LENS IMPLANT ;  Surgeon: Caprice Jiménez MD;  Location: [facility]     PHACOEMULSIFICATION CLEAR CORNEA WITH STANDARD INTRAOCULAR LENS IMPLANT Right 6/20/2017    Procedure: PHACOEMULSIFICATION CLEAR CORNEA WITH STANDARD INTRAOCULAR LENS IMPLANT;  RIGHT EYE PHACOEMULSIFICATION CLEAR CORNEA WITH STANDARD INTRAOCULAR LENS IMPLANT;  Surgeon: Caprice Jiménez MD;  Location: [facility]       Social History     Tobacco Use     Smoking status: Never  Smoker     Smokeless tobacco: Never Used     Tobacco comment: smoke free household   Substance Use Topics     Alcohol use: No     Alcohol/week: 0.0 standard drinks     Family History   Problem Relation Age of Onset     Lipids Mother      Cancer - colorectal Mother 70     Hypertension Mother      Arthritis Mother      Cancer Mother 84        lung cancer      Thyroid Disease Mother      Colon Cancer Mother      Other Cancer Mother      Arthritis Father      Hypertension Father      Macular Degeneration Father 85        monitors visison with [employer] grid, more trouble reading     Depression Father      Anxiety Disorder Father      Diabetes Maternal Grandmother      Diabetes Paternal Grandmother      Asthma Brother      Depression Brother      Anxiety Disorder Brother      Thyroid Disease Brother      Hypertension Sister      Thyroid Disease Sister      Eye Disorder Son 25        keratoconus?     Glaucoma Daughter 18        montoring pressure     Glaucoma Paternal Aunt      Diabetes Paternal Aunt      Hypertension Sister      Thyroid Disease Sister      Depression Brother      Anxiety Disorder Brother      Asthma Brother      Thyroid Disease Brother      Cerebrovascular Disease No family hx of          Current Outpatient Medications   Medication Sig Dispense Refill     CALCIUM 600+D PO 2 tablet daily       lisinopril (ZESTRIL) 2.5 MG tablet TAKE 1 TABLET (2.5 MG) BY MOUTH DAILY 90 tablet 1     Multiple Vitamins-Minerals (MULTIVITAMIN GUMMIES ADULT PO)        triamterene-HCTZ (DYAZIDE) 37.5-25 MG capsule Take 1 capsule by mouth every morning 90 capsule 1              Review of Systems   Constitutional, HEENT, cardiovascular, pulmonary, gi and gu systems are negative, except as otherwise noted.       Objective          Vitals:  No vitals were obtained today due to virtual visit.    healthy, alert and no distress  PSYCH: Alert and oriented times 3; coherent speech, normal   rate and volume, able to articulate logical  thoughts, able   to abstract reason, no tangential thoughts, no hallucinations   or delusions  Her affect is normal  RESP: No cough, no audible wheezing, able to talk in full sentences  Remainder of exam unable to be completed due to telephone visits            Assessment/Plan:    (I10) Hypertension goal BP (blood pressure) < 140/90  Comment: well controlled with 25# wt loss  Plan: BASIC METABOLIC PANEL, lisinopril (ZESTRIL) 2.5        MG tablet, triamterene-HCTZ (DYAZIDE) 37.5-25         MG capsule        Stop lisinopril, check bp at home on validated cuff.  Continue dyazide,  Refill x 6 months  f/u 6 months for OV and labs        Phone call duration:  8 minutes

## 2020-09-29 ENCOUNTER — ALLIED HEALTH/NURSE VISIT (OUTPATIENT)
Dept: NURSING | Facility: CLINIC | Age: 70
End: 2020-09-29
Payer: MEDICARE

## 2020-09-29 VITALS — DIASTOLIC BLOOD PRESSURE: 90 MMHG | SYSTOLIC BLOOD PRESSURE: 140 MMHG

## 2020-09-29 DIAGNOSIS — I10 HYPERTENSION GOAL BP (BLOOD PRESSURE) < 140/90: ICD-10-CM

## 2020-09-29 DIAGNOSIS — Z23 NEED FOR PROPHYLACTIC VACCINATION AND INOCULATION AGAINST INFLUENZA: Primary | ICD-10-CM

## 2020-09-29 LAB
ANION GAP SERPL CALCULATED.3IONS-SCNC: 3 MMOL/L (ref 3–14)
BUN SERPL-MCNC: 14 MG/DL (ref 7–30)
CALCIUM SERPL-MCNC: 10.1 MG/DL (ref 8.5–10.1)
CHLORIDE SERPL-SCNC: 103 MMOL/L (ref 94–109)
CO2 SERPL-SCNC: 34 MMOL/L (ref 20–32)
CREAT SERPL-MCNC: 0.68 MG/DL (ref 0.52–1.04)
GFR SERPL CREATININE-BSD FRML MDRD: 90 ML/MIN/{1.73_M2}
GLUCOSE SERPL-MCNC: 109 MG/DL (ref 70–99)
POTASSIUM SERPL-SCNC: 3.8 MMOL/L (ref 3.4–5.3)
SODIUM SERPL-SCNC: 140 MMOL/L (ref 133–144)

## 2020-09-29 PROCEDURE — 36415 COLL VENOUS BLD VENIPUNCTURE: CPT | Performed by: FAMILY MEDICINE

## 2020-09-29 PROCEDURE — 90662 IIV NO PRSV INCREASED AG IM: CPT

## 2020-09-29 PROCEDURE — 80048 BASIC METABOLIC PNL TOTAL CA: CPT | Performed by: FAMILY MEDICINE

## 2020-09-29 PROCEDURE — 99207 ZZC NO CHARGE NURSE ONLY: CPT

## 2020-09-29 PROCEDURE — 90471 IMMUNIZATION ADMIN: CPT

## 2020-09-29 PROCEDURE — G0008 ADMIN INFLUENZA VIRUS VAC: HCPCS | Mod: 59

## 2020-09-29 PROCEDURE — 90715 TDAP VACCINE 7 YRS/> IM: CPT

## 2020-09-29 NOTE — PROGRESS NOTES
Prior to immunization administration, verified patients identity using patient s name and date of birth. Please see Immunization Activity for additional information.     Screening Questionnaire for Adult Immunization    Are you sick today?   No   Do you have allergies to medications, food, a vaccine component or latex?   No   Have you ever had a serious reaction after receiving a vaccination?   No   Do you have a long-term health problem with heart, lung, kidney, or metabolic disease (e.g., diabetes), asthma, a blood disorder, no spleen, complement component deficiency, a cochlear implant, or a spinal fluid leak?  Are you on long-term aspirin therapy?   No   Do you have cancer, leukemia, HIV/AIDS, or any other immune system problem?   No   Do you have a parent, brother, or sister with an immune system problem?   No   In the past 3 months, have you taken medications that affect  your immune system, such as prednisone, other steroids, or anticancer drugs; drugs for the treatment of rheumatoid arthritis, Crohn s disease, or psoriasis; or have you had radiation treatments?   No   Have you had a seizure, or a brain or other nervous system problem?   No   During the past year, have you received a transfusion of blood or blood    products, or been given immune (gamma) globulin or antiviral drug?   No   For women: Are you pregnant or is there a chance you could become       pregnant during the next month?   No   Have you received any vaccinations in the past 4 weeks?   No     Immunization questionnaire answers were all negative.        Per orders of Dr. Thomason, injection of Tdap, Flu  given by Lesly Razo CMA. Patient instructed to remain in clinic for 15 minutes afterwards, and to report any adverse reaction to me immediately.       Screening performed by Lesly Razo CMA on 9/29/2020 at 8:10 AM.

## 2020-09-29 NOTE — PROGRESS NOTES
I met with Marisol Orta at the request of Dr. Burris to recheck her blood pressure.  Blood pressure medications on the med list were reviewed with patient.    Patient has taken all medications as per usual regimen: yes  Patient reports tolerating them without any issues or concerns: yes    Vitals:    09/29/20 1352 09/29/20 1353   BP: (!) 118/90 (!) 140/90       After 5 minutes, the patient's blood pressure remained greater than or equal to 140/90.    RN was notified and would go speak to patient. She had some questions about her blood pressure machine and manually doing the blood pressure. She also was asking questions about why her blood pressure would be all over. Referred her to the RN     Anson,CMA

## 2020-09-29 NOTE — NURSING NOTE
This RN called to triage patients BP.  Goal for patient's BP is <140/90.    Denies headache, dizziness, vision changes, chest pain, shortness of breath, numbness, tingling, nausea, and edema.  Patient has not yet stopped taking the lisinopril as discussed 9/14/20.  Continues to take dyazide.   No missed doses of medication in the past week.     Routing to PCP.    Nallely Wilkinson BSN, RN

## 2020-10-12 NOTE — PROGRESS NOTES
Please ask pt to increase lisinopril to 5 mg daily (2 tabs of what she has).  Pharmacy or ancillary bp check in 3 weeks.

## 2020-10-12 NOTE — PROGRESS NOTES
Patient/parent is informed of MD note below, as it is written. Verbalized good understanding.  Patient prefers to go to pharmacy.  Chioma Vega RN

## 2020-10-12 NOTE — PROGRESS NOTES
Left message on answering machine for patient/parent to call back.   [phone].  Chioma Vega RN

## 2020-10-30 ENCOUNTER — ALLIED HEALTH/NURSE VISIT (OUTPATIENT)
Dept: FAMILY MEDICINE | Facility: CLINIC | Age: 70
End: 2020-10-30
Payer: MEDICARE

## 2020-10-30 VITALS — HEART RATE: 80 BPM | DIASTOLIC BLOOD PRESSURE: 86 MMHG | SYSTOLIC BLOOD PRESSURE: 142 MMHG

## 2020-10-30 DIAGNOSIS — I10 HYPERTENSION GOAL BP (BLOOD PRESSURE) < 140/90: ICD-10-CM

## 2020-10-30 DIAGNOSIS — Z01.30 BLOOD PRESSURE CHECK: Primary | ICD-10-CM

## 2020-10-30 PROCEDURE — 99207 PR NO CHARGE NURSE ONLY: CPT | Performed by: FAMILY MEDICINE

## 2020-10-30 NOTE — PROGRESS NOTES
Marisol Orta was evaluated at [facility] on October 30, 2020 at which time her blood pressure was:    BP Readings from Last 3 Encounters:   10/30/20 (!) 142/86   09/29/20 (!) 140/90   03/10/20 137/79     Pulse Readings from Last 3 Encounters:   10/30/20 80   03/10/20 74   09/12/19 79       Reviewed lifestyle modifications for blood pressure control and reduction: including making healthy food choices, managing weight, getting regular exercise, smoking cessation, reducing alcohol consumption, monitoring blood pressure regularly.     Symptoms: None    BP Goal:< 140/90 mmHg    BP Assessment:  BP too high    Potential Reasons for BP too high: Unknown/Other: Not sure    BP Follow-Up Plan: Recheck BP in 30 days at pharmacy    Recommendation to Provider: None    Note completed by: Paras Choi RPh.  [facility]  [phone]

## 2020-10-30 NOTE — Clinical Note
Routing message to PCP for review because BP checked at pharmacy is above goal. Recommended patient to follow-up with PCP.  PCP please close this encounter.   Paras Choi RPh.  [facility]  [phone]

## 2020-11-05 RX ORDER — LISINOPRIL 10 MG/1
10 TABLET ORAL DAILY
Qty: 90 TABLET | Refills: 1 | Status: SHIPPED | OUTPATIENT
Start: 2020-11-05 | End: 2021-03-31

## 2020-11-05 NOTE — PROGRESS NOTES
Please notify pt to increase lisinopril to 5 mg (take 2 of her current tabs) and return after on that dose x 2 weeks for another bp check in pharmacy, bp not to goal of <140/90 at last check

## 2020-11-05 NOTE — PROGRESS NOTES
Patient informed of provider note as below. Patient will need new prescription. To provider to send new prescription to pharmacy      Shae GRENEN, RN, CPN

## 2020-11-05 NOTE — PROGRESS NOTES
See 9/29 ancillary visit. Patient was instructed to increase to 5mg at that time. Patient has been taking Lisinopril 5mg daily since    To provider to advise    Shae GREENN, RN, CPN

## 2020-11-30 NOTE — PROGRESS NOTES
"Marisol Orta is a 70 year old female who is being evaluated via a billable telephone visit.      The patient has been notified of following:     \"This telephone visit will be conducted via a call between you and your physician/provider. We have found that certain health care needs can be provided without the need for a physical exam.  This service lets us provide the care you need with a short phone conversation.  If a prescription is necessary we can send it directly to your pharmacy.  If lab work is needed we can place an order for that and you can then stop by our lab to have the test done at a later time.    Telephone visits are billed at different rates depending on your insurance coverage. During this emergency period, for some insurers they may be billed the same as an in-person visit.  Please reach out to your insurance provider with any questions.    If during the course of the call the physician/provider feels a telephone visit is not appropriate, you will not be charged for this service.\"    Patient has given verbal consent for Telephone visit?  Yes    What phone number would you like to be contacted at? [phone]    How would you like to obtain your AVS? MyChart    Subjective     Marisol Orta is a 70 year old female who presents via phone visit today for the following health issues:    HPI     Concern - Shortness of breath   Onset: Ongoing.    Description: Feeling shortness of breath/winded and rapid pulse x 2 months since increasing lisinopril , knot in stomach over past 30 days, radiates up to neck   Intensity: worsening   Progression of Symptoms:  Worsening x 5 days and intermittent  Accompanying Signs & Symptoms: nausea, BP ok, pulse running 98 today- normal pulse since October 60's-80's, loss of appetite- Has lost 25 pounds since Feb with diet changes, Anxiety.     Previous history of similar problem:  Worse since elevated BP checks- felt anxious with higher BP noted.   Lisinopril changed to " 10 mg x 1 month.    Precipitating factors:        Worsened by: Activity, going up and down stairs or walking - shortness of breath   Alleviating factors:        Improved by: unknown   Therapies tried and outcome: Rest, relaxation     Labs reviewed in EPIC  BP Readings from Last 3 Encounters:   12/02/20 134/80   10/30/20 (!) 142/86   09/29/20 (!) 140/90    Wt Readings from Last 3 Encounters:   03/10/20 85.5 kg (188 lb 6.4 oz)   08/20/19 85.9 kg (189 lb 6.4 oz)   01/08/19 88.1 kg (194 lb 3.2 oz)                  Patient Active Problem List   Diagnosis     Back pain     Osteoarthritis     Hypertension goal BP (blood pressure) < 140/90     Vitreous membranes and strands     Advanced directives, counseling/discussion     CARDIOVASCULAR SCREENING; LDL GOAL LESS THAN 160     Combined form of age-related cataract, right eye     Pseudophakia of both eyes     Lumbar radiculopathy     PXF (pseudoexfoliation of lens capsule)     Past Surgical History:   Procedure Laterality Date     APPENDECTOMY       CATARACT IOL, RT/LT  2017    Dr Jiménez     COLONOSCOPY  2012?     COLONOSCOPY WITH CO2 INSUFFLATION N/A 9/12/2019    Procedure: COLONOSCOPY, WITH CO2 INSUFFLATION;  Surgeon: Jose Preston MD;  Location: [facility]     FOOT SURGERY      left foot 1st  fusion     ORTHOPEDIC SURGERY       PHACOEMULSIFICATION CLEAR CORNEA WITH STANDARD INTRAOCULAR LENS IMPLANT Left 6/6/2017    Procedure: PHACOEMULSIFICATION CLEAR CORNEA WITH STANDARD INTRAOCULAR LENS IMPLANT;  LEFT EYE PHACOEMULSIFICATION CLEAR CORNEA WITH STANDARD INTRAOCULAR LENS IMPLANT ;  Surgeon: Caprice Jiménez MD;  Location: [facility]     PHACOEMULSIFICATION CLEAR CORNEA WITH STANDARD INTRAOCULAR LENS IMPLANT Right 6/20/2017    Procedure: PHACOEMULSIFICATION CLEAR CORNEA WITH STANDARD INTRAOCULAR LENS IMPLANT;  RIGHT EYE PHACOEMULSIFICATION CLEAR CORNEA WITH STANDARD INTRAOCULAR LENS IMPLANT;  Surgeon: Caprice Jiménez MD;  Location: [facility]       Social History      Tobacco Use     Smoking status: Never Smoker     Smokeless tobacco: Never Used     Tobacco comment: smoke free household   Substance Use Topics     Alcohol use: No     Alcohol/week: 0.0 standard drinks     Family History   Problem Relation Age of Onset     Lipids Mother      Cancer - colorectal Mother 70     Hypertension Mother      Arthritis Mother      Cancer Mother 84        lung cancer      Thyroid Disease Mother      Colon Cancer Mother      Other Cancer Mother      Arthritis Father      Hypertension Father      Macular Degeneration Father 85        monitors visison with [employer] grid, more trouble reading     Depression Father      Anxiety Disorder Father      Diabetes Maternal Grandmother      Diabetes Paternal Grandmother      Asthma Brother      Depression Brother      Anxiety Disorder Brother      Thyroid Disease Brother      Hypertension Sister      Thyroid Disease Sister      Eye Disorder Son 25        keratoconus?     Glaucoma Daughter 18        montoring pressure     Glaucoma Paternal Aunt      Diabetes Paternal Aunt      Hypertension Sister      Thyroid Disease Sister      Depression Brother      Anxiety Disorder Brother      Asthma Brother      Thyroid Disease Brother      Cerebrovascular Disease No family hx of          Current Outpatient Medications   Medication Sig Dispense Refill     CALCIUM 600+D PO 2 tablet daily       lisinopril (ZESTRIL) 10 MG tablet Take 1 tablet (10 mg) by mouth daily 90 tablet 1     Multiple Vitamins-Minerals (MULTIVITAMIN GUMMIES ADULT PO)        omeprazole (PRILOSEC) 40 MG DR capsule Take 1 capsule (40 mg) by mouth daily 90 capsule 0     triamterene-HCTZ (DYAZIDE) 37.5-25 MG capsule Take 1 capsule by mouth every morning 90 capsule 1               Review of Systems   Constitutional, HEENT, cardiovascular, pulmonary, gi and gu systems are negative, except as otherwise noted.       Objective          Vitals:  No vitals were obtained today due to virtual visit.    healthy,  alert and no distress  PSYCH: Alert and oriented times 3; coherent speech, normal   rate and volume, able to articulate logical thoughts, able   to abstract reason, no tangential thoughts, no hallucinations   or delusions  Her affect is normal  RESP: No cough, no audible wheezing, able to talk in full sentences  Remainder of exam unable to be completed due to telephone visits                Assessment & Plan   (R06.00) WHELAN (dyspnea on exertion)  (primary encounter diagnosis)  Comment: likely due to tachycardia with activity  Plan: **TSH with free T4 reflex FUTURE anytime, CBC         with platelets        Check labs, if normal consider cardiac w/u to include zio and stress test  Reviewed s/sx requiring immediate evaluation.     (R11.0) Nausea  Comment: likely GERD  Plan: omeprazole (PRILOSEC) 40 MG DR capsule        Trial of omeprazole x 4-6 weeks    (R63.4) Weight loss  Comment: intentional  Plan: **TSH with free T4 reflex FUTURE anytime,         **Comprehensive metabolic panel FUTURE anytime        Labs as above though pt has been working to lose wt since Feb 2020            See Patient Instructions    Return in about 4 weeks (around 12/30/2020) for if not improved or symptoms worsen.    Kizzy Thomason MD  [facility]    Phone call duration:  12 minutes

## 2020-12-02 ENCOUNTER — VIRTUAL VISIT (OUTPATIENT)
Dept: FAMILY MEDICINE | Facility: CLINIC | Age: 70
End: 2020-12-02
Payer: MEDICARE

## 2020-12-02 ENCOUNTER — ALLIED HEALTH/NURSE VISIT (OUTPATIENT)
Dept: FAMILY MEDICINE | Facility: CLINIC | Age: 70
End: 2020-12-02

## 2020-12-02 VITALS — DIASTOLIC BLOOD PRESSURE: 80 MMHG | HEART RATE: 101 BPM | SYSTOLIC BLOOD PRESSURE: 134 MMHG

## 2020-12-02 DIAGNOSIS — R06.09 DOE (DYSPNEA ON EXERTION): Primary | ICD-10-CM

## 2020-12-02 DIAGNOSIS — R11.0 NAUSEA: ICD-10-CM

## 2020-12-02 DIAGNOSIS — R63.4 WEIGHT LOSS: ICD-10-CM

## 2020-12-02 DIAGNOSIS — Z01.30 BLOOD PRESSURE CHECK: Primary | ICD-10-CM

## 2020-12-02 PROCEDURE — 99207 PR NO CHARGE NURSE ONLY: CPT | Performed by: FAMILY MEDICINE

## 2020-12-02 PROCEDURE — 99442 PR PHYSICIAN TELEPHONE EVALUATION 11-20 MIN: CPT | Mod: 95 | Performed by: FAMILY MEDICINE

## 2020-12-02 RX ORDER — OMEPRAZOLE 40 MG/1
40 CAPSULE, DELAYED RELEASE ORAL DAILY
Qty: 90 CAPSULE | Refills: 0 | Status: SHIPPED | OUTPATIENT
Start: 2020-12-02 | End: 2021-05-25

## 2020-12-02 NOTE — PROGRESS NOTES
Marisol Orta was evaluated at [facility] on December 2, 2020 at which time her blood pressure was:    BP Readings from Last 3 Encounters:   12/02/20 134/80   10/30/20 (!) 142/86   09/29/20 (!) 140/90     Pulse Readings from Last 3 Encounters:   12/02/20 101   10/30/20 80   03/10/20 74       Reviewed lifestyle modifications for blood pressure control and reduction: including making healthy food choices, managing weight, getting regular exercise, smoking cessation, reducing alcohol consumption, monitoring blood pressure regularly.     Symptoms: None    BP Goal:< 140/90 mmHg    BP Assessment:  BP at goal    Potential Reasons for BP too high: NA - Not applicable    BP Follow-Up Plan: Recheck BP in 6 months at pharmacy    Recommendation to Provider: None    Note completed by: Paras Choi RPh.  [facility]  [phone]

## 2020-12-10 DIAGNOSIS — R63.4 WEIGHT LOSS: ICD-10-CM

## 2020-12-10 DIAGNOSIS — R06.09 DOE (DYSPNEA ON EXERTION): ICD-10-CM

## 2020-12-10 LAB
ALBUMIN SERPL-MCNC: 3.8 G/DL (ref 3.4–5)
ALP SERPL-CCNC: 57 U/L (ref 40–150)
ALT SERPL W P-5'-P-CCNC: 21 U/L (ref 0–50)
ANION GAP SERPL CALCULATED.3IONS-SCNC: 6 MMOL/L (ref 3–14)
AST SERPL W P-5'-P-CCNC: 14 U/L (ref 0–45)
BILIRUB SERPL-MCNC: 0.6 MG/DL (ref 0.2–1.3)
BUN SERPL-MCNC: 11 MG/DL (ref 7–30)
CALCIUM SERPL-MCNC: 9.5 MG/DL (ref 8.5–10.1)
CHLORIDE SERPL-SCNC: 100 MMOL/L (ref 94–109)
CO2 SERPL-SCNC: 34 MMOL/L (ref 20–32)
CREAT SERPL-MCNC: 0.85 MG/DL (ref 0.52–1.04)
ERYTHROCYTE [DISTWIDTH] IN BLOOD BY AUTOMATED COUNT: 12.2 % (ref 10–15)
GFR SERPL CREATININE-BSD FRML MDRD: 70 ML/MIN/{1.73_M2}
GLUCOSE SERPL-MCNC: 104 MG/DL (ref 70–99)
HCT VFR BLD AUTO: 38.4 % (ref 35–47)
HGB BLD-MCNC: 13.2 G/DL (ref 11.7–15.7)
MCH RBC QN AUTO: 31.2 PG (ref 26.5–33)
MCHC RBC AUTO-ENTMCNC: 34.4 G/DL (ref 31.5–36.5)
MCV RBC AUTO: 91 FL (ref 78–100)
PLATELET # BLD AUTO: 235 10E9/L (ref 150–450)
POTASSIUM SERPL-SCNC: 3.2 MMOL/L (ref 3.4–5.3)
PROT SERPL-MCNC: 6.9 G/DL (ref 6.8–8.8)
RBC # BLD AUTO: 4.23 10E12/L (ref 3.8–5.2)
SODIUM SERPL-SCNC: 140 MMOL/L (ref 133–144)
TSH SERPL DL<=0.005 MIU/L-ACNC: 1.78 MU/L (ref 0.4–4)
WBC # BLD AUTO: 5.8 10E9/L (ref 4–11)

## 2020-12-10 PROCEDURE — 85027 COMPLETE CBC AUTOMATED: CPT | Performed by: FAMILY MEDICINE

## 2020-12-10 PROCEDURE — 84443 ASSAY THYROID STIM HORMONE: CPT | Performed by: FAMILY MEDICINE

## 2020-12-10 PROCEDURE — 80053 COMPREHEN METABOLIC PANEL: CPT | Performed by: FAMILY MEDICINE

## 2020-12-10 PROCEDURE — 36415 COLL VENOUS BLD VENIPUNCTURE: CPT | Performed by: FAMILY MEDICINE

## 2021-03-30 DIAGNOSIS — I10 HYPERTENSION GOAL BP (BLOOD PRESSURE) < 140/90: ICD-10-CM

## 2021-03-30 NOTE — LETTER
March 31, 2021    Marisol Orta  [address] [id number]    Dear Marisol,       We recently received a refill request for lisinopril (ZESTRIL) 10 MG tablet and triamterene-HCTZ (DYAZIDE) 37.5-25 MG capsule.  We have refilled this for a one time 90 day supply only because you are due for a:    Wellness office visit and fasting lab appointment      Please schedule this lab appointment 4-5 days prior to the office visit.     Please call at your earliest convenience so that there will not be a delay with your future refills.          Thank you,   Your [facility] Team/sp  [phone]

## 2021-03-31 RX ORDER — LISINOPRIL 10 MG/1
10 TABLET ORAL DAILY
Qty: 90 TABLET | Refills: 0 | Status: SHIPPED | OUTPATIENT
Start: 2021-03-31 | End: 2021-05-25

## 2021-03-31 RX ORDER — TRIAMTERENE AND HYDROCHLOROTHIAZIDE 37.5; 25 MG/1; MG/1
1 CAPSULE ORAL EVERY MORNING
Qty: 90 CAPSULE | Refills: 0 | Status: SHIPPED | OUTPATIENT
Start: 2021-03-31 | End: 2021-05-25

## 2021-03-31 NOTE — TELEPHONE ENCOUNTER
Please schedule wellness exam next available.  90 day supply of meds sent.  Needs previsit labs 1 week prior to visit, orders in.

## 2021-04-17 ENCOUNTER — HEALTH MAINTENANCE LETTER (OUTPATIENT)
Age: 71
End: 2021-04-17

## 2021-05-13 NOTE — PROGRESS NOTES
"SUBJECTIVE:   Marisol Orta is a 71 year old female who presents for Preventive Visit.      Patient has been advised of split billing requirements and indicates understanding: Yes   Are you in the first 12 months of your Medicare coverage?  No    Healthy Habits:     In general, how would you rate your overall health?  Good    Frequency of exercise:  4-5 days/week    Duration of exercise:  30-45 minutes    Do you usually eat at least 4 servings of fruit and vegetables a day, include whole grains    & fiber and avoid regularly eating high fat or \"junk\" foods?  No    Taking medications regularly:  Yes    Medication side effects:  None    Ability to successfully perform activities of daily living:  No assistance needed    Home Safety:  No safety concerns identified    Hearing Impairment:  No hearing concerns    In the past 6 months, have you been bothered by leaking of urine?  No    In general, how would you rate your overall mental or emotional health?  Good      PHQ-2 Total Score: 0    Additional concerns today:  Yes    Do you feel safe in your environment? Yes    Have you ever done Advance Care Planning? (For example, a Health Directive, POLST, or a discussion with a medical provider or your loved ones about your wishes): Has information from previous visit, has started to fill out         Fall risk  Fallen 2 or more times in the past year?: No  Any fall with injury in the past year?: No    Cognitive Screening   1) Repeat 3 items (Leader, Season, Table)    2) Clock draw: NORMAL  3) 3 item recall: Recalls 3 objects  Results: NORMAL clock, 1-2 items recalled: COGNITIVE IMPAIRMENT LESS LIKELY    Mini-CogTM Copyright ANA Daniel. Licensed by the author for use in [facility]; reprinted with permission (kei@.[facility]). All rights reserved.      Do you have sleep apnea, excessive snoring or daytime drowsiness?: no    Reviewed and updated as needed this visit by clinical staff  Tobacco  Allergies  Meds  Problems "  Med Hx  Surg Hx  Fam Hx  Soc Hx          Reviewed and updated as needed this visit by Provider  Tobacco  Allergies  Meds  Problems  Med Hx  Surg Hx  Fam Hx         Social History     Tobacco Use     Smoking status: Never Smoker     Smokeless tobacco: Never Used     Tobacco comment: smoke free household   Substance Use Topics     Alcohol use: No     Alcohol/week: 0.0 standard drinks         Alcohol Use 5/25/2021   Prescreen: >3 drinks/day or >7 drinks/week? No   Prescreen: >3 drinks/day or >7 drinks/week? -               Current providers sharing in care for this patient include:   Patient Care Team:  Kizzy Thomason MD as PCP - General (Family Practice)  Kizzy Thomason MD as Assigned PCP    The following health maintenance items are reviewed in Epic and correct as of today:  Health Maintenance Due   Topic Date Due     ANNUAL REVIEW OF  ORDERS  Never done     ZOSTER IMMUNIZATION (2 of 3) 12/03/2012     FALL RISK ASSESSMENT  03/10/2021     G 2 P 2   No LMP recorded. Patient is postmenopausal.     Fasting: No  Td: Tdap 9/2020       Flu: 9/2020      Covid: completed 3/26/2021      Shingrix: recommended      PPV: completed      NO - age 65 - see link Cervical Cytology Screening Guidelines               Cholesterol:   Lab Results   Component Value Date    CHOL 189 01/02/2019     Lab Results   Component Value Date    HDL 73 01/02/2019     Lab Results   Component Value Date    LDL 90 01/02/2019     Lab Results   Component Value Date    TRIG 130 01/02/2019     Lab Results   Component Value Date    CHOLHDLRATIO 2.6 07/09/2014         MMG: 3/2020  Dexa:  7/2017 q10y     Flex/colo: 9/2019 q5 yr      Seat Belt: Yes    Sunscreen use: Yes   Calcium Intake: adeq  Health Care Directive: Yes   Sexually Active: Yes     Current contraception: none  History of abnormal Pap smear: No  Family history of colon/breast/ovarian cancer: Yes: see [facility]  Regular self breast exam: Yes  History of abnormal mammogram:  No       Labs reviewed in EPIC  BP Readings from Last 3 Encounters:   05/25/21 128/76   12/02/20 134/80   10/30/20 (!) 142/86    Wt Readings from Last 3 Encounters:   05/25/21 82.3 kg (181 lb 6.4 oz)   03/10/20 85.5 kg (188 lb 6.4 oz)   08/20/19 85.9 kg (189 lb 6.4 oz)                  Patient Active Problem List   Diagnosis     Back pain     Osteoarthritis     Hypertension goal BP (blood pressure) < 140/90     Vitreous membranes and strands     Advanced directives, counseling/discussion     CARDIOVASCULAR SCREENING; LDL GOAL LESS THAN 160     Combined form of age-related cataract, right eye     Pseudophakia of both eyes     Lumbar radiculopathy     PXF (pseudoexfoliation of lens capsule)     Past Surgical History:   Procedure Laterality Date     APPENDECTOMY       CATARACT IOL, RT/LT  2017    Dr Jiménez     COLONOSCOPY  2012?     COLONOSCOPY WITH CO2 INSUFFLATION N/A 9/12/2019    Procedure: COLONOSCOPY, WITH CO2 INSUFFLATION;  Surgeon: Jose Preston MD;  Location: [facility] OR     FOOT SURGERY      left foot 1st  fusion     ORTHOPEDIC SURGERY       PHACOEMULSIFICATION CLEAR CORNEA WITH STANDARD INTRAOCULAR LENS IMPLANT Left 6/6/2017    Procedure: PHACOEMULSIFICATION CLEAR CORNEA WITH STANDARD INTRAOCULAR LENS IMPLANT;  LEFT EYE PHACOEMULSIFICATION CLEAR CORNEA WITH STANDARD INTRAOCULAR LENS IMPLANT ;  Surgeon: Caprice Jiménez MD;  Location: [facility]     PHACOEMULSIFICATION CLEAR CORNEA WITH STANDARD INTRAOCULAR LENS IMPLANT Right 6/20/2017    Procedure: PHACOEMULSIFICATION CLEAR CORNEA WITH STANDARD INTRAOCULAR LENS IMPLANT;  RIGHT EYE PHACOEMULSIFICATION CLEAR CORNEA WITH STANDARD INTRAOCULAR LENS IMPLANT;  Surgeon: Caprice Jiménez MD;  Location: [facility]       Social History     Tobacco Use     Smoking status: Never Smoker     Smokeless tobacco: Never Used     Tobacco comment: smoke free household   Substance Use Topics     Alcohol use: No     Alcohol/week: 0.0 standard drinks     Family History   Problem  Relation Age of Onset     Lipids Mother      Cancer - colorectal Mother 70     Hypertension Mother      Arthritis Mother      Cancer Mother 84        lung cancer      Thyroid Disease Mother      Colon Cancer Mother      Other Cancer Mother      Arthritis Father      Hypertension Father      Macular Degeneration Father 85        monitors visison with [employer] grid, more trouble reading     Depression Father      Anxiety Disorder Father      Diabetes Maternal Grandmother      Diabetes Paternal Grandmother      Asthma Brother      Depression Brother      Anxiety Disorder Brother      Thyroid Disease Brother      Hypertension Sister      Thyroid Disease Sister      Eye Disorder Son 25        keratoconus?     Glaucoma Daughter 18        montoring pressure     Glaucoma Paternal Aunt      Diabetes Paternal Aunt      Hypertension Sister      Thyroid Disease Sister      Depression Brother      Anxiety Disorder Brother      Asthma Brother      Thyroid Disease Brother      Cerebrovascular Disease No family hx of          Current Outpatient Medications   Medication Sig Dispense Refill     CALCIUM 600+D PO 2 tablet daily       lisinopril (ZESTRIL) 10 MG tablet Take 1 tablet (10 mg) by mouth daily 90 tablet 0     Multiple Vitamins-Minerals (MULTIVITAMIN GUMMIES ADULT PO)        triamterene-HCTZ (DYAZIDE) 37.5-25 MG capsule Take 1 capsule by mouth every morning 90 capsule 0         FSH-7:   Breast CA Risk Assessment (FHS-7) 5/25/2021   Did any of your first-degree relatives have breast or ovarian cancer? No   Did any of your relatives have bilateral breast cancer? No   Did any man in your family have breast cancer? No   Did any woman in your family have breast and ovarian cancer? No   Did any woman in your family have breast cancer before age 50 y? No   Do you have 2 or more relatives with breast and/or ovarian cancer? No   Do you have 2 or more relatives with breast and/or bowel cancer? No       Mammogram Screening: Recommended  "mammography every 1-2 years with patient discussion and risk factor consideration  Pertinent mammograms are reviewed under the imaging tab.    Review of Systems   Constitutional: Negative for chills and fever.   HENT: Negative for congestion, ear pain, hearing loss and sore throat.    Eyes: Negative for pain and visual disturbance.   Respiratory: Negative for cough and shortness of breath.    Cardiovascular: Negative for chest pain, palpitations and peripheral edema.   Gastrointestinal: Negative for abdominal pain, constipation, diarrhea, heartburn, hematochezia and nausea.   Breasts:  Negative for tenderness, breast mass and discharge.   Genitourinary: Negative for dysuria, frequency, genital sores, hematuria, pelvic pain, urgency, vaginal bleeding and vaginal discharge.   Musculoskeletal: Positive for arthralgias. Negative for joint swelling and myalgias.   Skin: Negative for rash.   Neurological: Negative for dizziness, weakness, headaches and paresthesias.   Psychiatric/Behavioral: Negative for mood changes. The patient is not nervous/anxious.          OBJECTIVE:   /76   Pulse 82   Temp 98.2  F (36.8  C) (Oral)   Resp 18   Ht 1.588 m (5' 2.5\")   Wt 82.3 kg (181 lb 6.4 oz)   BMI 32.65 kg/m   Estimated body mass index is 32.65 kg/m  as calculated from the following:    Height as of this encounter: 1.588 m (5' 2.5\").    Weight as of this encounter: 82.3 kg (181 lb 6.4 oz).  Physical Exam  GENERAL APPEARANCE: healthy, alert and no distress  EYES: Eyes grossly normal to inspection, PERRL and conjunctivae and sclerae normal  HENT: ear canals and TM's normal, nose and mouth without ulcers or lesions, oropharynx clear and oral mucous membranes moist  NECK: no adenopathy, no asymmetry, masses, or scars and thyroid normal to palpation  RESP: lungs clear to auscultation - no rales, rhonchi or wheezes  BREAST: normal without masses, tenderness or nipple discharge and no palpable axillary masses or " "adenopathy  CV: regular rate and rhythm, normal S1 S2, no S3 or S4, no murmur, click or rub, no peripheral edema and peripheral pulses strong  ABDOMEN: soft, nontender, no hepatosplenomegaly, no masses and bowel sounds normal  MS: no musculoskeletal defects are noted and gait is age appropriate without ataxia, + ttp over left ischial spine, no ttp from internal approach.  SKIN: no suspicious lesions or rashes  NEURO: Normal strength and tone, sensory exam grossly normal, mentation intact and speech normal  PSYCH: mentation appears normal and affect normal/bright    Diagnostic Test Results:  Labs reviewed in Epic    ASSESSMENT / PLAN:   (Z00.00) Encounter for Medicare annual wellness exam  (primary encounter diagnosis)  Comment: preventive needs reviewed   Plan: see orders in Epic.     (I10) Hypertension goal BP (blood pressure) < 140/90  Comment: to goal  Plan: lisinopril (ZESTRIL) 10 MG tablet,         triamterene-HCTZ (DYAZIDE) 37.5-25 MG capsule         Refill x 6 months f/u 6 months for anc/pharm bp check and labs         (M70.70) Ischial bursitis, unspecified laterality  Comment: likely due to exercise  Plan: [facility]        Refer to PT  Icing reviewed.      Patient has been advised of split billing requirements and indicates understanding: Yes  COUNSELING:  Reviewed preventive health counseling, as reflected in patient instructions  Special attention given to:       Regular exercise       Healthy diet/nutrition    Estimated body mass index is 32.65 kg/m  as calculated from the following:    Height as of this encounter: 1.588 m (5' 2.5\").    Weight as of this encounter: 82.3 kg (181 lb 6.4 oz).    Weight management plan: Discussed healthy diet and exercise guidelines    She reports that she has never smoked. She has never used smokeless tobacco.      Appropriate preventive services were discussed with this patient, including applicable screening as appropriate for cardiovascular disease, " diabetes, osteopenia/osteoporosis, and glaucoma.  As appropriate for age/gender, discussed screening for colorectal cancer, prostate cancer, breast cancer, and cervical cancer. Checklist reviewing preventive services available has been given to the patient.    Reviewed patients plan of care and provided an AVS. The Intermediate Care Plan ( asthma action plan, low back pain action plan, and migraine action plan) for Marisol meets the Care Plan requirement. This Care Plan has been established and reviewed with the Patient.    Counseling Resources:  ATP IV Guidelines  Pooled Cohorts Equation Calculator  Breast Cancer Risk Calculator  Breast Cancer: Medication to Reduce Risk  FRAX Risk Assessment  ICSI Preventive Guidelines  Dietary Guidelines for Americans, 2010  USDA's MyPlate  ASA Prophylaxis  Lung CA Screening    Kizzy Thomason MD  [facility]    Identified Health Risks:

## 2021-05-18 DIAGNOSIS — I10 HYPERTENSION GOAL BP (BLOOD PRESSURE) < 140/90: ICD-10-CM

## 2021-05-18 LAB
ANION GAP SERPL CALCULATED.3IONS-SCNC: 5 MMOL/L (ref 3–14)
BUN SERPL-MCNC: 16 MG/DL (ref 7–30)
CALCIUM SERPL-MCNC: 9.6 MG/DL (ref 8.5–10.1)
CHLORIDE SERPL-SCNC: 106 MMOL/L (ref 94–109)
CO2 SERPL-SCNC: 31 MMOL/L (ref 20–32)
CREAT SERPL-MCNC: 0.79 MG/DL (ref 0.52–1.04)
GFR SERPL CREATININE-BSD FRML MDRD: 76 ML/MIN/{1.73_M2}
GLUCOSE SERPL-MCNC: 99 MG/DL (ref 70–99)
POTASSIUM SERPL-SCNC: 4.2 MMOL/L (ref 3.4–5.3)
SODIUM SERPL-SCNC: 142 MMOL/L (ref 133–144)

## 2021-05-18 PROCEDURE — 80048 BASIC METABOLIC PNL TOTAL CA: CPT | Performed by: FAMILY MEDICINE

## 2021-05-18 PROCEDURE — 36415 COLL VENOUS BLD VENIPUNCTURE: CPT | Performed by: FAMILY MEDICINE

## 2021-05-23 RX ORDER — OMEPRAZOLE 40 MG/1
40 CAPSULE, DELAYED RELEASE ORAL DAILY
Qty: 90 CAPSULE | Refills: 0 | Status: CANCELLED | OUTPATIENT
Start: 2021-05-23

## 2021-05-25 ENCOUNTER — OFFICE VISIT (OUTPATIENT)
Dept: FAMILY MEDICINE | Facility: CLINIC | Age: 71
End: 2021-05-25
Payer: MEDICARE

## 2021-05-25 VITALS
RESPIRATION RATE: 18 BRPM | WEIGHT: 181.4 LBS | DIASTOLIC BLOOD PRESSURE: 76 MMHG | SYSTOLIC BLOOD PRESSURE: 128 MMHG | BODY MASS INDEX: 32.14 KG/M2 | HEART RATE: 82 BPM | HEIGHT: 63 IN | TEMPERATURE: 98.2 F

## 2021-05-25 DIAGNOSIS — R11.0 NAUSEA: ICD-10-CM

## 2021-05-25 DIAGNOSIS — I10 HYPERTENSION GOAL BP (BLOOD PRESSURE) < 140/90: ICD-10-CM

## 2021-05-25 DIAGNOSIS — Z00.00 ENCOUNTER FOR MEDICARE ANNUAL WELLNESS EXAM: Primary | ICD-10-CM

## 2021-05-25 DIAGNOSIS — M70.70 ISCHIAL BURSITIS, UNSPECIFIED LATERALITY: ICD-10-CM

## 2021-05-25 PROCEDURE — 99213 OFFICE O/P EST LOW 20 MIN: CPT | Mod: 25 | Performed by: FAMILY MEDICINE

## 2021-05-25 PROCEDURE — G0439 PPPS, SUBSEQ VISIT: HCPCS | Performed by: FAMILY MEDICINE

## 2021-05-25 RX ORDER — OMEPRAZOLE 40 MG/1
CAPSULE, DELAYED RELEASE ORAL
COMMUNITY
Start: 2020-12-02 | End: 2021-05-25

## 2021-05-25 RX ORDER — TRIAMTERENE AND HYDROCHLOROTHIAZIDE 37.5; 25 MG/1; MG/1
1 CAPSULE ORAL EVERY MORNING
Qty: 90 CAPSULE | Refills: 0 | Status: SHIPPED | OUTPATIENT
Start: 2021-05-25 | End: 2021-10-13

## 2021-05-25 RX ORDER — LISINOPRIL 10 MG/1
10 TABLET ORAL DAILY
Qty: 90 TABLET | Refills: 0 | Status: SHIPPED | OUTPATIENT
Start: 2021-05-25 | End: 2021-10-13

## 2021-05-25 ASSESSMENT — ENCOUNTER SYMPTOMS
JOINT SWELLING: 0
NAUSEA: 0
WEAKNESS: 0
MYALGIAS: 0
PARESTHESIAS: 0
FREQUENCY: 0
HEMATURIA: 0
FEVER: 0
COUGH: 0
SORE THROAT: 0
DIZZINESS: 0
CONSTIPATION: 0
BREAST MASS: 0
DYSURIA: 0
CHILLS: 0
HEADACHES: 0
SHORTNESS OF BREATH: 0
ABDOMINAL PAIN: 0
HEARTBURN: 0
EYE PAIN: 0
HEMATOCHEZIA: 0
PALPITATIONS: 0
DIARRHEA: 0
NERVOUS/ANXIOUS: 0
ARTHRALGIAS: 1

## 2021-05-25 ASSESSMENT — MIFFLIN-ST. JEOR: SCORE: 1309.02

## 2021-05-25 ASSESSMENT — ACTIVITIES OF DAILY LIVING (ADL): CURRENT_FUNCTION: NO ASSISTANCE NEEDED

## 2021-05-25 NOTE — NURSING NOTE
"Chief Complaint   Patient presents with     Wellness Visit       Initial BP (!) 148/89   Pulse 82   Temp 98.2  F (36.8  C) (Oral)   Resp 18   Ht 1.588 m (5' 2.5\")   Wt 82.3 kg (181 lb 6.4 oz)   BMI 32.65 kg/m   Estimated body mass index is 32.65 kg/m  as calculated from the following:    Height as of this encounter: 1.588 m (5' 2.5\").    Weight as of this encounter: 82.3 kg (181 lb 6.4 oz).  Medication Reconciliation: complete  Felix Jones CMA    "

## 2021-05-25 NOTE — RESULT ENCOUNTER NOTE
Addressed at clinic visit.  
Marisol,  Here are your lab results.  We will discuss these at your upcoming office or telephone visit.   See you soon.  Kizzy Thomason MD
yes

## 2021-06-08 ENCOUNTER — THERAPY VISIT (OUTPATIENT)
Dept: PHYSICAL THERAPY | Facility: CLINIC | Age: 71
End: 2021-06-08
Attending: FAMILY MEDICINE
Payer: MEDICARE

## 2021-06-08 DIAGNOSIS — M70.70 ISCHIAL BURSITIS, UNSPECIFIED LATERALITY: ICD-10-CM

## 2021-06-08 PROCEDURE — 97161 PT EVAL LOW COMPLEX 20 MIN: CPT | Mod: GP | Performed by: PHYSICAL THERAPIST

## 2021-06-08 PROCEDURE — 97110 THERAPEUTIC EXERCISES: CPT | Mod: GP | Performed by: PHYSICAL THERAPIST

## 2021-06-08 NOTE — LETTER
DEPARTMENT OF HEALTH AND HUMAN SERVICES  CENTERS FOR MEDICARE & MEDICAID SERVICES    PLAN/UPDATED PLAN OF PROGRESS FOR OUTPATIENT REHABILITATION       PATIENTS NAME:  Marisol Orta   : 1950  PROVIDER NUMBER:    [id number]  [id number]  PROVIDER NAME: [facility]  MEDICAL RECORD NUMBER: [id number]   START OF CARE DATE:  SOC Date: 21   TYPE:  PT  PRIMARY/TREATMENT DIAGNOSIS: (Pertinent Medical Diagnosis)  Ischial bursitis, unspecified laterality  VISITS FROM START OF CARE:  Rxs Used: 1     Physical Therapy Initial Evaluation  Subjective:  The history is provided by the patient.   Patient Health History  Marisol Orta being seen for Ischial bursitis.   Problem occurred: Walking   Pain is reported as 3/10 on pain scale.  General health as reported by patient is good.  Pertinent medical history includes: high blood pressure, overweight and osteoarthritis.   Medical allergies: none.   Surgeries include:  Orthopedic surgery and other. Other surgery history details: Cataract, appendix.    Current medications:  High blood pressure medication.    Current occupation is Retired.   Primary job tasks include:  Other.   Other job/home tasks details: NA.                Therapist Generated HPI Evaluation  Problem details: L > R gluteal pain.  Pain for 6 months.  L has been more sore lately.  MD referral 2021..         Type of problem:  Lumbar.  This is a chronic condition.  Condition occurred with:  Insidious onset.  Where condition occurred: in the community.  Site of Pain: L > R ischeal tuberosity.  Pain quality: dull. and is intermittent.  Pain radiates to:  No radiation. Pain is worse during the day.  Since onset symptoms are gradually improving.  Symptoms are exacerbated by bending, walking and sitting (walk up hill;  prolong sit;  roll in bed)  and relieved by rest and ice.             Objective:  Standing Alignment:    Lumbar:  Normal  Pelvic:   Normal      PATIENTS NAME:  Marisol Orta   : 1950       Lumbar/SI Evaluation  ROM:    AROM Lumbar:   Flexion:            Slight end range pain  Ext:                    Normal    Side Bend:        Left:  Normal     Right:  Normal  Rotation:           Left:  Normal     Right:  Normal  Side Glide:        Left:     Right:   Lumbar Myotomes:  Lumbar myotomes: L HS pain with resistance 4/5.  T12-L3 (Hip Flex):  Left: 5      L2-4 (Quads):  Left:  5      L4 (Ankle DF):  Left:  5      L5 (Great Toe Ext): Left: 5      S1 (Toe Raise):  Left: 5      Lumbar DTR's:  normal  Neural Tension/Mobility:  Neural tension wnl lumbar: HS pain on L     Left side:SLR or Slump  negative.   Right side:   Slump or SLR  negative.   Lumbar Palpation:    Tenderness present at Left:    Ischial Tuberosity  Tenderness not present at Right:  Ischial Tuberosity  Functional Tests:  normal  Lumbar Provocation:  normal  SI joint/Sacrum:    Normal     Assessment/Plan:    Patient is a 71 year old female with L > R ischial tuberosity pain complaints.    Patient has the following significant findings with corresponding treatment plan.                Diagnosis 1:  Ischial tuberosity pain  Pain -  self management, education, directional preference exercise and home program  Decreased ROM/flexibility - manual therapy and therapeutic exercise  Decreased strength - therapeutic exercise and therapeutic activities  Decreased function - therapeutic activities    Therapy Evaluation Codes:   1) History comprised of:   Personal factors that impact the plan of care:      None.    Comorbidity factors that impact the plan of care are:      High blood pressure, Osteoarthritis and Overweight.     Medications impacting care: High blood pressure.  2) Examination of Body Systems comprised of:   Body structures and functions that impact the plan of care:      ischial tuberosity.   Activity limitations that impact the plan of care are:      Bending and  "Walking.  3) Clinical presentation characteristics are:   Stable/Uncomplicated.  4) Decision-Making    Low complexity using standardized patient assessment instrument and/or measureable assessment of functional outcome.  PATIENTS NAME:  Marisol Orta   : 1950    Cumulative Therapy Evaluation is: Low complexity.  Previous and current functional limitations:  (See Goal Flow Sheet for this information)    Short term and Long term goals: (See Goal Flow Sheet for this information)   Communication ability:  Patient appears to be able to clearly communicate and understand verbal and written communication and follow directions correctly.  Treatment Explanation - The following has been discussed with the patient:   RX ordered/plan of care  Anticipated outcomes  Possible risks and side effects  This patient would benefit from PT intervention to resume normal activities.   Rehab potential is good.  Frequency:  1 X week, once daily  Duration:  for 6 weeks  Discharge Plan:  Achieve all LTG.  Independent in home treatment program.  Reach maximal therapeutic benefit.  Pt will call to schedule next visit in a few weeks.  Please refer to the daily flowsheet for treatment today, total treatment time and time spent performing 1:1 timed codes.           Caregiver Signature/Credentials _____________________________ Date ________      Treating Provider: Randolph Rasheed PT   I have reviewed and certified the need for these services and plan of treatment while under my care.        PHYSICIAN'S SIGNATURE:   _________________________________________  Date___________   Kizzy Thomason M.D.    Certification period:  Beginning of Cert date period: 21 to  End of Cert period date: 21     Functional Level Progress Report: Please see attached \"Goal Flow sheet for Functional level.\"    ____X____ Continue Services or       ________ DC Services                Service dates: From  SOC Date: 21 date to present                 "

## 2021-06-08 NOTE — PROGRESS NOTES
Physical Therapy Initial Evaluation  Subjective:  The history is provided by the patient.   Patient Health History  Marisol Orta being seen for Ischial bursitis.       Problem occurred: Walking   Pain is reported as 3/10 on pain scale.  General health as reported by patient is good.  Pertinent medical history includes: high blood pressure, overweight and osteoarthritis.     Medical allergies: none.   Surgeries include:  Orthopedic surgery and other. Other surgery history details: Cataract, appendix.    Current medications:  High blood pressure medication.    Current occupation is Retired.   Primary job tasks include:  Other.   Other job/home tasks details: NA.                Therapist Generated HPI Evaluation  Problem details: L > R gluteal pain.  Pain for 6 months.  L has been more sore lately.  MD referral 5/25/2021..         Type of problem:  Lumbar.    This is a chronic condition.  Condition occurred with:  Insidious onset.  Where condition occurred: in the community.  Site of Pain: L > R ischeal tuberosity.  Pain quality: dull. and is intermittent.  Pain radiates to:  No radiation. Pain is worse during the day.  Since onset symptoms are gradually improving.  Symptoms are exacerbated by bending, walking and sitting (walk up hill;  prolong sit;  roll in bed)  and relieved by rest and ice.                              Objective:  Standing Alignment:        Lumbar:  Normal  Pelvic:  Normal                         Lumbar/SI Evaluation  ROM:    AROM Lumbar:   Flexion:            Slight end range pain  Ext:                    Normal    Side Bend:        Left:  Normal     Right:  Normal  Rotation:           Left:  Normal     Right:  Normal  Side Glide:        Left:     Right:           Lumbar Myotomes:  Lumbar myotomes: L HS pain with resistance 4/5.  T12-L3 (Hip Flex):  Left: 5      L2-4 (Quads):  Left:  5      L4 (Ankle DF):  Left:  5      L5 (Great Toe Ext): Left: 5      S1 (Toe Raise):  Left: 5      Lumbar DTR's:   normal          Neural Tension/Mobility:  Neural tension wnl lumbar: HS pain on L       Left side:SLR or Slump  negative.     Right side:   Slump or SLR  negative.   Lumbar Palpation:    Tenderness present at Left:    Ischial Tuberosity    Tenderness not present at Right:  Ischial Tuberosity  Functional Tests:  normal        Lumbar Provocation:  normal        SI joint/Sacrum:    Normal                                                        General     ROS    Assessment/Plan:    Patient is a 71 year old female with L > R ischial tuberosity pain complaints.    Patient has the following significant findings with corresponding treatment plan.                Diagnosis 1:  Ischial tuberosity pain  Pain -  self management, education, directional preference exercise and home program  Decreased ROM/flexibility - manual therapy and therapeutic exercise  Decreased strength - therapeutic exercise and therapeutic activities  Decreased function - therapeutic activities    Therapy Evaluation Codes:   1) History comprised of:   Personal factors that impact the plan of care:      None.    Comorbidity factors that impact the plan of care are:      High blood pressure, Osteoarthritis and Overweight.     Medications impacting care: High blood pressure.  2) Examination of Body Systems comprised of:   Body structures and functions that impact the plan of care:      ischial tuberosity.   Activity limitations that impact the plan of care are:      Bending and Walking.  3) Clinical presentation characteristics are:   Stable/Uncomplicated.  4) Decision-Making    Low complexity using standardized patient assessment instrument and/or measureable assessment of functional outcome.  Cumulative Therapy Evaluation is: Low complexity.    Previous and current functional limitations:  (See Goal Flow Sheet for this information)    Short term and Long term goals: (See Goal Flow Sheet for this information)     Communication ability:  Patient appears to  be able to clearly communicate and understand verbal and written communication and follow directions correctly.  Treatment Explanation - The following has been discussed with the patient:   RX ordered/plan of care  Anticipated outcomes  Possible risks and side effects  This patient would benefit from PT intervention to resume normal activities.   Rehab potential is good.    Frequency:  1 X week, once daily  Duration:  for 6 weeks  Discharge Plan:  Achieve all LTG.  Independent in home treatment program.  Reach maximal therapeutic benefit.    Pt will call to schedule next visit in a few weeks.    Please refer to the daily flowsheet for treatment today, total treatment time and time spent performing 1:1 timed codes.

## 2021-07-19 ENCOUNTER — TELEPHONE (OUTPATIENT)
Dept: FAMILY MEDICINE | Facility: CLINIC | Age: 71
End: 2021-07-19

## 2021-07-19 DIAGNOSIS — L98.9 SKIN LESION OF FACE: Primary | ICD-10-CM

## 2021-07-19 NOTE — TELEPHONE ENCOUNTER
Patient is calling to get a dermatology referral for a cyst on her face. She said  You looked at it last time she was in. She also has a red spot on her forehead that she would like removed. She would like to see Dr Hood at the [facility] in [place]. Pended referral.Kayla Boogie MA/EYAL

## 2021-07-27 ENCOUNTER — TELEPHONE (OUTPATIENT)
Dept: FAMILY MEDICINE | Facility: CLINIC | Age: 71
End: 2021-07-27

## 2021-07-27 NOTE — TELEPHONE ENCOUNTER
Patient called stating the [place] Location for Dermatology is booked out until October.She is requesting a referral be placed for [facility] for the L98.9 (ICD-10-CM) - Skin lesion of face please. She will be able to get in much sooner. Please advise. Please contact the patient to let her know what has been done.  Adrianne Marquis

## 2021-08-31 PROBLEM — M70.70: Status: RESOLVED | Noted: 2021-06-08 | Resolved: 2021-08-31

## 2021-09-26 ENCOUNTER — HEALTH MAINTENANCE LETTER (OUTPATIENT)
Age: 71
End: 2021-09-26

## 2021-10-11 DIAGNOSIS — I10 HYPERTENSION GOAL BP (BLOOD PRESSURE) < 140/90: ICD-10-CM

## 2021-10-13 RX ORDER — LISINOPRIL 10 MG/1
TABLET ORAL
Qty: 90 TABLET | Refills: 1 | Status: SHIPPED | OUTPATIENT
Start: 2021-10-13 | End: 2022-05-11

## 2021-10-13 RX ORDER — TRIAMTERENE AND HYDROCHLOROTHIAZIDE 37.5; 25 MG/1; MG/1
CAPSULE ORAL
Qty: 90 CAPSULE | Refills: 1 | Status: SHIPPED | OUTPATIENT
Start: 2021-10-13 | End: 2022-05-11

## 2021-11-27 ENCOUNTER — DOCUMENTATION ONLY (OUTPATIENT)
Dept: LAB | Facility: CLINIC | Age: 71
End: 2021-11-27
Payer: MEDICARE

## 2021-11-27 DIAGNOSIS — I10 HYPERTENSION GOAL BP (BLOOD PRESSURE) < 140/90: Primary | ICD-10-CM

## 2021-11-27 NOTE — PROGRESS NOTES
Marisol Orta has an upcoming lab appointment:    Future Appointments   Date Time Provider Department Center   11/30/2021  8:15 AM AN LAB [facility]   12/14/2021  7:55 AM Kizzy Thomason MD [facility]   12/15/2021  8:30 AM Sindhu Leach OD [facility]     Patient is scheduled for the following lab(s): labs    There is no order available. Please review and place either future orders or HMPO (Review of Health Maintenance Protocol Orders), as appropriate.    Health Maintenance Due   Topic     ANNUAL REVIEW OF HM ORDERS      NIKHIL Maddox

## 2021-11-30 ENCOUNTER — LAB (OUTPATIENT)
Dept: LAB | Facility: CLINIC | Age: 71
End: 2021-11-30
Payer: MEDICARE

## 2021-11-30 DIAGNOSIS — I10 HYPERTENSION GOAL BP (BLOOD PRESSURE) < 140/90: ICD-10-CM

## 2021-11-30 LAB
ANION GAP SERPL CALCULATED.3IONS-SCNC: 5 MMOL/L (ref 3–14)
BUN SERPL-MCNC: 17 MG/DL (ref 7–30)
CALCIUM SERPL-MCNC: 9.3 MG/DL (ref 8.5–10.1)
CHLORIDE BLD-SCNC: 107 MMOL/L (ref 94–109)
CO2 SERPL-SCNC: 27 MMOL/L (ref 20–32)
CREAT SERPL-MCNC: 0.74 MG/DL (ref 0.52–1.04)
GFR SERPL CREATININE-BSD FRML MDRD: 83 ML/MIN/1.73M2
GLUCOSE BLD-MCNC: 108 MG/DL (ref 70–99)
POTASSIUM BLD-SCNC: 3.6 MMOL/L (ref 3.4–5.3)
SODIUM SERPL-SCNC: 139 MMOL/L (ref 133–144)

## 2021-11-30 PROCEDURE — 36415 COLL VENOUS BLD VENIPUNCTURE: CPT

## 2021-11-30 PROCEDURE — 80048 BASIC METABOLIC PNL TOTAL CA: CPT

## 2021-12-06 NOTE — PROGRESS NOTES
"  Assessment & Plan     (I10) Hypertension goal BP (blood pressure) < 140/90  (primary encounter diagnosis)  Comment: to goal  Plan: recent refill 10/2021, ok to refill until wellness 5/2022    (Z12.31) Visit for screening mammogram  Comment: due 3/2022  Plan: MA Screen Bilateral w/Kevin                        BMI:   Estimated body mass index is 33.59 kg/m  as calculated from the following:    Height as of 5/25/21: 1.588 m (5' 2.5\").    Weight as of this encounter: 84.6 kg (186 lb 9.6 oz).   Weight management plan: Discussed healthy diet and exercise guidelines    Patient Instructions     Continue all medications.      Please  call [phone] to schedule your mammogram for March.               Return in about 5 months (around 5/14/2022) for Fasting Lab Work, Wellness Exam.    Kizzy Thomason MD  [facility]    Paul Damon is a 71 year old who presents for the following health issues     History of Present Illness       Hypertension: She presents for follow up of hypertension.  She does not check blood pressure  regularly outside of the clinic. Outpatient blood pressures have not been over 140/90. She does not follow a low salt diet.     She eats 2-3 servings of fruits and vegetables daily.She consumes 0 sweetened beverage(s) daily.She exercises with enough effort to increase her heart rate 30 to 60 minutes per day.  She exercises with enough effort to increase her heart rate 5 days per week.   She is taking medications regularly.   Hypertension ROS: taking medications as instructed, no medication side effects noted, no TIA's, no chest pain on exertion, no dyspnea on exertion, no swelling of ankles.  No headache or visual changes.            Review of Systems   Constitutional, HEENT, cardiovascular, pulmonary, gi and gu systems are negative, except as otherwise noted.      Objective    /75   Pulse 87   Temp 98.5  F (36.9  C) (Oral)   Resp 20   Wt 84.6 kg (186 lb 9.6 oz)   " SpO2 100%   BMI 33.59 kg/m    Body mass index is 33.59 kg/m .  Physical Exam   GENERAL: healthy, alert and no distress  EYES: Eyes grossly normal to inspection, PERRL and conjunctivae and sclerae normal  RESP: lungs clear to auscultation - no rales, rhonchi or wheezes  CV: regular rate and rhythm, normal S1 S2, no S3 or S4, no murmur, click or rub, no peripheral edema and peripheral pulses strong  MS: no gross musculoskeletal defects noted, no edema  SKIN: no suspicious lesions or rashes  PSYCH: mentation appears normal, affect normal/bright

## 2021-12-14 ENCOUNTER — OFFICE VISIT (OUTPATIENT)
Dept: FAMILY MEDICINE | Facility: CLINIC | Age: 71
End: 2021-12-14
Payer: MEDICARE

## 2021-12-14 VITALS
BODY MASS INDEX: 33.59 KG/M2 | DIASTOLIC BLOOD PRESSURE: 75 MMHG | HEART RATE: 87 BPM | TEMPERATURE: 98.5 F | SYSTOLIC BLOOD PRESSURE: 131 MMHG | RESPIRATION RATE: 20 BRPM | WEIGHT: 186.6 LBS | OXYGEN SATURATION: 100 %

## 2021-12-14 DIAGNOSIS — Z12.31 VISIT FOR SCREENING MAMMOGRAM: ICD-10-CM

## 2021-12-14 DIAGNOSIS — I10 HYPERTENSION GOAL BP (BLOOD PRESSURE) < 140/90: Primary | ICD-10-CM

## 2021-12-14 PROCEDURE — 99213 OFFICE O/P EST LOW 20 MIN: CPT | Performed by: FAMILY MEDICINE

## 2021-12-14 ASSESSMENT — PAIN SCALES - GENERAL: PAINLEVEL: NO PAIN (0)

## 2021-12-14 NOTE — NURSING NOTE
"Chief Complaint   Patient presents with     Hypertension       Initial /75   Pulse 87   Temp 98.5  F (36.9  C) (Oral)   Resp 20   Wt 84.6 kg (186 lb 9.6 oz)   SpO2 100%   BMI 33.59 kg/m   Estimated body mass index is 33.59 kg/m  as calculated from the following:    Height as of 5/25/21: 1.588 m (5' 2.5\").    Weight as of this encounter: 84.6 kg (186 lb 9.6 oz).  Medication Reconciliation: complete  Felix Jones CMA    "

## 2021-12-14 NOTE — PATIENT INSTRUCTIONS
Continue all medications.      Please  call [phone] to schedule your mammogram for March.

## 2021-12-15 ENCOUNTER — OFFICE VISIT (OUTPATIENT)
Dept: OPTOMETRY | Facility: CLINIC | Age: 71
End: 2021-12-15
Payer: MEDICARE

## 2021-12-15 DIAGNOSIS — Z96.1 PSEUDOPHAKIA OF BOTH EYES: Primary | ICD-10-CM

## 2021-12-15 DIAGNOSIS — H52.4 PRESBYOPIA: ICD-10-CM

## 2021-12-15 DIAGNOSIS — H52.223 REGULAR ASTIGMATISM OF BOTH EYES: ICD-10-CM

## 2021-12-15 DIAGNOSIS — H26.8 PXF (PSEUDOEXFOLIATION OF LENS CAPSULE): ICD-10-CM

## 2021-12-15 PROCEDURE — 92014 COMPRE OPH EXAM EST PT 1/>: CPT | Performed by: OPTOMETRIST

## 2021-12-15 PROCEDURE — 92015 DETERMINE REFRACTIVE STATE: CPT | Mod: GY | Performed by: OPTOMETRIST

## 2021-12-15 ASSESSMENT — REFRACTION_MANIFEST
OD_CYLINDER: +1.00
OD_SPHERE: -0.75
OD_ADD: +2.75
OS_CYLINDER: +1.25
OS_SPHERE: -0.25
OD_SPHERE: -0.25
OD_CYLINDER: +1.00
OS_ADD: +2.75
OS_SPHERE: -0.50
OS_AXIS: 120
OS_AXIS: 116
OD_AXIS: 035
OD_AXIS: 034
OS_CYLINDER: +1.25

## 2021-12-15 ASSESSMENT — PACHYMETRY
OD_CT(UM): .605
OS_CT(UM): .601

## 2021-12-15 ASSESSMENT — REFRACTION_WEARINGRX
OD_ADD: +2.75
OS_AXIS: 115
OD_SPHERE: -0.50
OD_AXIS: 045
OS_ADD: +2.75
OS_SPHERE: -0.25
OD_CYLINDER: +1.00
OS_SPHERE: -0.25
SPECS_TYPE: PAL
OS_CYLINDER: +1.00
OD_AXIS: 043
OD_SPHERE: -0.50
OS_CYLINDER: +1.25
OD_CYLINDER: +1.00
OS_AXIS: 120
SPECS_TYPE: PAL

## 2021-12-15 ASSESSMENT — VISUAL ACUITY
OD_CC: 20/20
OS_CC: 20/20
METHOD: SNELLEN - LINEAR
OD_CC: 20/20
OD_CC+: -1
CORRECTION_TYPE: GLASSES
OS_CC: 20/20

## 2021-12-15 ASSESSMENT — CONF VISUAL FIELD
METHOD: COUNTING FINGERS
OS_NORMAL: 1
OD_NORMAL: 1

## 2021-12-15 ASSESSMENT — CUP TO DISC RATIO
OD_RATIO: 0.2
OS_RATIO: 0.2

## 2021-12-15 ASSESSMENT — KERATOMETRY
OS_K2POWER_DIOPTERS: 43.75
OS_K1POWER_DIOPTERS: 42.25
OD_K1POWER_DIOPTERS: 42.25
OD_K2POWER_DIOPTERS: 43.25
OD_AXISANGLE2_DEGREES: 136
OS_AXISANGLE2_DEGREES: 20

## 2021-12-15 ASSESSMENT — SLIT LAMP EXAM - LIDS
COMMENTS: NORMAL
COMMENTS: NORMAL

## 2021-12-15 ASSESSMENT — TONOMETRY
OD_IOP_MMHG: 18
OS_IOP_MMHG: 18
IOP_METHOD: APPLANATION

## 2021-12-15 ASSESSMENT — EXTERNAL EXAM - RIGHT EYE: OD_EXAM: NORMAL

## 2021-12-15 ASSESSMENT — EXTERNAL EXAM - LEFT EYE: OS_EXAM: NORMAL

## 2021-12-15 NOTE — PROGRESS NOTES
Chief Complaint   Patient presents with     Annual Eye Exam         Last Eye Exam: 08/18/2020    Dilated Previously: yes, side effects of dilation explained today    What are you currently using to see?  glasses       Distance Vision Acuity: Satisfied with vision    Near Vision Acuity: Satisfied with vision while reading and using computer with glasses    Eye Comfort: dry -ok with drops   Do you use eye drops? : Yes: Using OTC dry eyes 1-2 x daily  Occupation or Hobbies: retired     [place]         Medical, surgical and family histories reviewed and updated 12/15/2021.       OBJECTIVE: See Ophthalmology exam    ASSESSMENT:    ICD-10-CM    1. 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 Presbyopia  H52.4    4. Regular astigmatism of both eyes  H52.223       PLAN:     Patient Instructions   Optional to fill new glasses prescription, minimal change  Continue diet high in fruits , vegetables , omega 3 oil  Return in 1 year for eye exam    Sindhu Leach, OD  [phone]

## 2021-12-15 NOTE — PATIENT INSTRUCTIONS
Optional to fill new glasses prescription, minimal change  Continue diet high in fruits , vegetables , omega 3 oil  Return in 1 year for eye exam    Sindhu Leach, OD  [phone]

## 2021-12-15 NOTE — LETTER
12/15/2021         RE: Marisol Orta  [address] [facility] [id number]        Dear Colleague,    Thank you for referring your patient, Marisol Orta, to the [facility]. Please see a copy of my visit note below.    Chief Complaint   Patient presents with     Annual Eye Exam         Last Eye Exam: 08/18/2020    Dilated Previously: yes, side effects of dilation explained today    What are you currently using to see?  glasses       Distance Vision Acuity: Satisfied with vision    Near Vision Acuity: Satisfied with vision while reading and using computer with glasses    Eye Comfort: dry -ok with drops   Do you use eye drops? : Yes: Using OTC dry eyes 1-2 x daily  Occupation or Hobbies: retired     Myah [place]         Medical, surgical and family histories reviewed and updated 12/15/2021.       OBJECTIVE: See Ophthalmology exam    ASSESSMENT:    ICD-10-CM    1. 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 Presbyopia  H52.4    4. Regular astigmatism of both eyes  H52.223       PLAN:     Patient Instructions   Optional to fill new glasses prescription, minimal change  Continue diet high in fruits , vegetables , omega 3 oil  Return in 1 year for eye exam    Sindhu Leach, OD  [phone]                       Again, thank you for allowing me to participate in the care of your patient.        Sincerely,        Sindhu Leach, OD

## 2022-03-21 ENCOUNTER — ANCILLARY PROCEDURE (OUTPATIENT)
Dept: MAMMOGRAPHY | Facility: CLINIC | Age: 72
End: 2022-03-21
Attending: FAMILY MEDICINE
Payer: MEDICARE

## 2022-03-21 DIAGNOSIS — Z12.31 VISIT FOR SCREENING MAMMOGRAM: ICD-10-CM

## 2022-03-21 PROCEDURE — 77063 BREAST TOMOSYNTHESIS BI: CPT | Mod: TC | Performed by: RADIOLOGY

## 2022-03-21 PROCEDURE — 77067 SCR MAMMO BI INCL CAD: CPT | Mod: TC | Performed by: RADIOLOGY

## 2022-05-11 DIAGNOSIS — I10 HYPERTENSION GOAL BP (BLOOD PRESSURE) < 140/90: ICD-10-CM

## 2022-05-11 RX ORDER — LISINOPRIL 10 MG/1
10 TABLET ORAL DAILY
Qty: 90 TABLET | Refills: 1 | Status: SHIPPED | OUTPATIENT
Start: 2022-05-11 | End: 2022-05-11

## 2022-05-11 RX ORDER — TRIAMTERENE AND HYDROCHLOROTHIAZIDE 37.5; 25 MG/1; MG/1
1 CAPSULE ORAL EVERY MORNING
Qty: 90 CAPSULE | Refills: 1 | Status: SHIPPED | OUTPATIENT
Start: 2022-05-11 | End: 2022-05-11

## 2022-05-11 RX ORDER — TRIAMTERENE AND HYDROCHLOROTHIAZIDE 37.5; 25 MG/1; MG/1
1 CAPSULE ORAL EVERY MORNING
Qty: 45 CAPSULE | Refills: 0 | Status: SHIPPED | OUTPATIENT
Start: 2022-05-11 | End: 2022-06-16

## 2022-05-11 RX ORDER — LISINOPRIL 10 MG/1
10 TABLET ORAL DAILY
Qty: 45 TABLET | Refills: 0 | Status: SHIPPED | OUTPATIENT
Start: 2022-05-11 | End: 2022-06-16

## 2022-05-11 NOTE — TELEPHONE ENCOUNTER
Reason for Call:  Medication or medication refill:    Do you use a [facility]?  Name of the pharmacy and phone number for the current request:  [facility]ly[facility] Dom[facility] [phone]    Name of the medication requested: lisinopril (ZESTRIL) 10 MG tablet and triamterene-HCTZ (DYAZIDE) 37.5-25 MG capsule    Other request: n/a    Can we leave a detailed message on this number? YES    Phone number patient can be reached at: Home number on file [phone] (home)    Best Time: Any    Call taken on 5/11/2022 at 8:32 AM by Adonay Benjamin

## 2022-05-12 RX ORDER — LISINOPRIL 10 MG/1
TABLET ORAL
Qty: 90 TABLET | OUTPATIENT
Start: 2022-05-12

## 2022-05-12 RX ORDER — TRIAMTERENE AND HYDROCHLOROTHIAZIDE 37.5; 25 MG/1; MG/1
CAPSULE ORAL
Qty: 90 CAPSULE | OUTPATIENT
Start: 2022-05-12

## 2022-06-06 ENCOUNTER — TELEPHONE (OUTPATIENT)
Dept: LAB | Facility: CLINIC | Age: 72
End: 2022-06-06
Payer: MEDICARE

## 2022-06-06 DIAGNOSIS — Z13.6 CARDIOVASCULAR SCREENING; LDL GOAL LESS THAN 160: ICD-10-CM

## 2022-06-06 DIAGNOSIS — I10 HYPERTENSION GOAL BP (BLOOD PRESSURE) < 140/90: Primary | ICD-10-CM

## 2022-06-09 ENCOUNTER — LAB (OUTPATIENT)
Dept: LAB | Facility: CLINIC | Age: 72
End: 2022-06-09
Payer: MEDICARE

## 2022-06-09 DIAGNOSIS — Z13.6 CARDIOVASCULAR SCREENING; LDL GOAL LESS THAN 160: ICD-10-CM

## 2022-06-09 DIAGNOSIS — I10 HYPERTENSION GOAL BP (BLOOD PRESSURE) < 140/90: ICD-10-CM

## 2022-06-09 LAB
ANION GAP SERPL CALCULATED.3IONS-SCNC: 5 MMOL/L (ref 3–14)
BUN SERPL-MCNC: 16 MG/DL (ref 7–30)
CALCIUM SERPL-MCNC: 9.6 MG/DL (ref 8.5–10.1)
CHLORIDE BLD-SCNC: 106 MMOL/L (ref 94–109)
CHOLEST SERPL-MCNC: 197 MG/DL
CO2 SERPL-SCNC: 30 MMOL/L (ref 20–32)
CREAT SERPL-MCNC: 0.79 MG/DL (ref 0.52–1.04)
FASTING STATUS PATIENT QL REPORTED: YES
GFR SERPL CREATININE-BSD FRML MDRD: 80 ML/MIN/1.73M2
GLUCOSE BLD-MCNC: 95 MG/DL (ref 70–99)
HDLC SERPL-MCNC: 73 MG/DL
LDLC SERPL CALC-MCNC: 89 MG/DL
NONHDLC SERPL-MCNC: 124 MG/DL
POTASSIUM BLD-SCNC: 3.3 MMOL/L (ref 3.4–5.3)
SODIUM SERPL-SCNC: 141 MMOL/L (ref 133–144)
TRIGL SERPL-MCNC: 175 MG/DL

## 2022-06-09 PROCEDURE — 80061 LIPID PANEL: CPT

## 2022-06-09 PROCEDURE — 80048 BASIC METABOLIC PNL TOTAL CA: CPT

## 2022-06-09 PROCEDURE — 36415 COLL VENOUS BLD VENIPUNCTURE: CPT

## 2022-06-09 NOTE — PROGRESS NOTES
"SUBJECTIVE:   Marisol Orta is a 72 year old female who presents for Preventive Visit.      Patient has been advised of split billing requirements and indicates understanding: Yes  Are you in the first 12 months of your Medicare coverage?  No    Healthy Habits:     In general, how would you rate your overall health?  Good    Frequency of exercise:  4-5 days/week    Duration of exercise:  30-45 minutes    Do you usually eat at least 4 servings of fruit and vegetables a day, include whole grains    & fiber and avoid regularly eating high fat or \"junk\" foods?  No    Taking medications regularly:  Yes    Medication side effects:  None    Ability to successfully perform activities of daily living:  No assistance needed    Home Safety:  No safety concerns identified    Hearing Impairment:  No hearing concerns    In the past 6 months, have you been bothered by leaking of urine?  No    In general, how would you rate your overall mental or emotional health?  Good      PHQ-2 Total Score: 0    Additional concerns today:  No    Do you feel safe in your environment? Yes    Have you ever done Advance Care Planning? (For example, a Health Directive, POLST, or a discussion with a medical provider or your loved ones about your wishes): Has information from previous visit        Fall risk  Fallen 2 or more times in the past year?: No  Any fall with injury in the past year?: No    Cognitive Screening   1) Repeat 3 items (Leader, Season, Table)    2) Clock draw: NORMAL  3) 3 item recall: Recalls 3 objects  Results: NORMAL clock, 1-2 items recalled: COGNITIVE IMPAIRMENT LESS LIKELY    Mini-CogTM Copyright ANA Daniel. Licensed by the author for use in [facility]; reprinted with permission (kei@.[facility]). All rights reserved.      Do you have sleep apnea, excessive snoring or daytime drowsiness?: no    Reviewed and updated as needed this visit by clinical staff   Tobacco  Allergies  Meds  Problems  Med Hx  Surg Hx  Fam " Hx  Soc   Hx          Reviewed and updated as needed this visit by Provider   Tobacco  Allergies  Meds  Problems  Med Hx  Surg Hx  Fam Hx           Social History     Tobacco Use     Smoking status: Never Smoker     Smokeless tobacco: Never Used     Tobacco comment: smoke free household   Substance Use Topics     Alcohol use: No         Alcohol Use 6/13/2022   Prescreen: >3 drinks/day or >7 drinks/week? No   Prescreen: >3 drinks/day or >7 drinks/week? -               Current providers sharing in care for this patient include:   Patient Care Team:  Kizzy Thomason MD as PCP - General (Family Practice)  Kizzy Thomason MD as Assigned PCP  Dilma Soliz MD as MD (Dermatology)  Sindhu Leach OD as Assigned Surgical Provider    The following health maintenance items are reviewed in Epic and correct as of today:  Health Maintenance Due   Topic Date Due     ANNUAL REVIEW OF HM ORDERS  Never done     ADVANCE CARE PLANNING  07/27/2021     COVID-19 Vaccine (4 - Booster for Moderna series) 03/14/2022       G 2 P 2   No LMP recorded. Patient is postmenopausal.     Fasting: No   Td: tdap 9/2020       Flu: 10/2021      Covid: Mod boost#1 11/14/2021      Shingrix: done      PPV: done      NO - age 65 - see link Cervical Cytology Screening Guidelines               Cholesterol:   Lab Results   Component Value Date    CHOL 197 06/09/2022    CHOL 189 01/02/2019     Lab Results   Component Value Date    HDL 73 06/09/2022    HDL 73 01/02/2019     Lab Results   Component Value Date    LDL 89 06/09/2022    LDL 90 01/02/2019     Lab Results   Component Value Date    TRIG 175 06/09/2022    TRIG 130 01/02/2019     Lab Results   Component Value Date    CHOLHDLRATIO 2.6 07/09/2014         MMG: 3/2022  Dexa:  7/2017     Flex/colo: 9/2019 q5y scope      Seat Belt: Yes    Sunscreen use: Yes   Calcium Intake: adeq  Health Care Directive: Yes   Sexually Active: Yes     Current contraception: none  History of abnormal Pap  smear: No  Family history of colon/breast/ovarian cancer: Yes: see [facility] [place]  Regular self breast exam: Yes  History of abnormal mammogram: No      Labs reviewed in EPIC  BP Readings from Last 3 Encounters:   06/16/22 122/70   12/14/21 131/75   05/25/21 128/76    Wt Readings from Last 3 Encounters:   06/16/22 84.9 kg (187 lb 3.2 oz)   12/14/21 84.6 kg (186 lb 9.6 oz)   05/25/21 82.3 kg (181 lb 6.4 oz)                  Patient Active Problem List   Diagnosis     Back pain     Osteoarthritis     Hypertension goal BP (blood pressure) < 140/90     Vitreous membranes and strands     Advanced directives, counseling/discussion     CARDIOVASCULAR SCREENING; LDL GOAL LESS THAN 160     Combined form of age-related cataract, right eye     Pseudophakia of both eyes     Lumbar radiculopathy     PXF (pseudoexfoliation of lens capsule)     Past Surgical History:   Procedure Laterality Date     APPENDECTOMY       CATARACT IOL, RT/LT  2017    Dr Jiménez     COLONOSCOPY  2012?     COLONOSCOPY WITH CO2 INSUFFLATION N/A 9/12/2019    Procedure: COLONOSCOPY, WITH CO2 INSUFFLATION;  Surgeon: Jose Preston MD;  Location:  OR     FOOT SURGERY      left foot 1st  fusion     ORTHOPEDIC SURGERY       PHACOEMULSIFICATION CLEAR CORNEA WITH STANDARD INTRAOCULAR LENS IMPLANT Left 6/6/2017    Procedure: PHACOEMULSIFICATION CLEAR CORNEA WITH STANDARD INTRAOCULAR LENS IMPLANT;  LEFT EYE PHACOEMULSIFICATION CLEAR CORNEA WITH STANDARD INTRAOCULAR LENS IMPLANT ;  Surgeon: Caprice Jiménez MD;  Location: [facility]     PHACOEMULSIFICATION CLEAR CORNEA WITH STANDARD INTRAOCULAR LENS IMPLANT Right 6/20/2017    Procedure: PHACOEMULSIFICATION CLEAR CORNEA WITH STANDARD INTRAOCULAR LENS IMPLANT;  RIGHT EYE PHACOEMULSIFICATION CLEAR CORNEA WITH STANDARD INTRAOCULAR LENS IMPLANT;  Surgeon: Caprice Jiménez MD;  Location: [facility]       Social History     Tobacco Use     Smoking status: Never Smoker     Smokeless tobacco: Never Used     Tobacco comment: smoke  free household   Substance Use Topics     Alcohol use: No     Family History   Problem Relation Age of Onset     Lipids Mother      Cancer - colorectal Mother 70     Hypertension Mother      Arthritis Mother      Cancer Mother 84        lung cancer      Thyroid Disease Mother      Colon Cancer Mother      Other Cancer Mother      Arthritis Father      Hypertension Father      Macular Degeneration Father 85        monitors visison with [employer] grid, more trouble reading     Depression Father      Anxiety Disorder Father      Diabetes Maternal Grandmother      Diabetes Paternal Grandmother      Asthma Brother      Depression Brother      Anxiety Disorder Brother      Thyroid Disease Brother      Hypertension Sister      Thyroid Disease Sister      Eye Disorder Son 25        keratoconus?     Glaucoma Daughter 18        montoring pressure     Glaucoma Paternal Aunt      Diabetes Paternal Aunt      Hypertension Sister      Thyroid Disease Sister      Depression Brother      Anxiety Disorder Brother      Asthma Brother      Thyroid Disease Brother      Cerebrovascular Disease No family hx of          Current Outpatient Medications   Medication Sig Dispense Refill     CALCIUM 600+D PO 2 tablet daily       lisinopril (ZESTRIL) 10 MG tablet Take 1 tablet (10 mg) by mouth daily 90 tablet 1     Multiple Vitamins-Minerals (MULTIVITAMIN GUMMIES ADULT PO)        triamterene-HCTZ (DYAZIDE) 37.5-25 MG capsule Take 1 capsule by mouth every morning 90 capsule 1             Review of Systems   Constitutional: Negative for chills and fever.   HENT: Negative for congestion, ear pain, hearing loss and sore throat.    Eyes: Negative for pain and visual disturbance.   Respiratory: Negative for cough and shortness of breath.    Cardiovascular: Negative for chest pain, palpitations and peripheral edema.   Gastrointestinal: Negative for abdominal pain, constipation, diarrhea, heartburn, hematochezia and nausea.   Breasts:  Negative for  "tenderness, breast mass and discharge.   Genitourinary: Negative for dysuria, frequency, genital sores, hematuria, pelvic pain, urgency, vaginal bleeding and vaginal discharge.   Musculoskeletal: Positive for arthralgias. Negative for joint swelling and myalgias.   Skin: Negative for rash.   Neurological: Negative for dizziness, weakness, headaches and paresthesias.   Psychiatric/Behavioral: Negative for mood changes. The patient is not nervous/anxious.          OBJECTIVE:   /70   Pulse 86   Temp 98.4  F (36.9  C) (Oral)   Resp 18   Ht 1.588 m (5' 2.5\")   Wt 84.9 kg (187 lb 3.2 oz)   SpO2 96%   BMI 33.69 kg/m   Estimated body mass index is 33.69 kg/m  as calculated from the following:    Height as of this encounter: 1.588 m (5' 2.5\").    Weight as of this encounter: 84.9 kg (187 lb 3.2 oz).  Physical Exam  GENERAL APPEARANCE: healthy, alert and no distress  EYES: Eyes grossly normal to inspection, PERRL and conjunctivae and sclerae normal  HENT: ear canals and TM's normal, nose and mouth without ulcers or lesions, oropharynx clear and oral mucous membranes moist  NECK: no adenopathy, no asymmetry, masses, or scars and thyroid normal to palpation  RESP: lungs clear to auscultation - no rales, rhonchi or wheezes  BREAST: normal without masses, tenderness or nipple discharge and no palpable axillary masses or adenopathy  CV: regular rate and rhythm, normal S1 S2, no S3 or S4, no murmur, click or rub, no peripheral edema and peripheral pulses strong  ABDOMEN: soft, nontender, no hepatosplenomegaly, no masses and bowel sounds normal  MS: no musculoskeletal defects are noted and gait is age appropriate without ataxia  SKIN: no suspicious lesions or rashes  NEURO: Normal strength and tone, sensory exam grossly normal, mentation intact and speech normal  PSYCH: mentation appears normal and affect normal/bright    Diagnostic Test Results:  Labs reviewed in Epic    ASSESSMENT / PLAN:   (Z00.00) Encounter for " "Medicare annual wellness exam  (primary encounter diagnosis)  Comment: preventive needs reviewed   Plan: see orders in Epic.     (I10) Hypertension goal BP (blood pressure) < 140/90  Comment: to goal  Plan: OFFICE/OUTPT VISIT,EST,LEVL III, lisinopril         (ZESTRIL) 10 MG tablet, triamterene-HCTZ         (DYAZIDE) 37.5-25 MG capsule, **Basic metabolic        panel FUTURE 6mo         Refill x 6 months continue meds  f/u 6 months for labs           COUNSELING:  Reviewed preventive health counseling, as reflected in patient instructions  Special attention given to:       Regular exercise       Healthy diet/nutrition    Estimated body mass index is 33.69 kg/m  as calculated from the following:    Height as of this encounter: 1.588 m (5' 2.5\").    Weight as of this encounter: 84.9 kg (187 lb 3.2 oz).    Weight management plan: Discussed healthy diet and exercise guidelines    She reports that she has never smoked. She has never used smokeless tobacco.      Appropriate preventive services were discussed with this patient, including applicable screening as appropriate for cardiovascular disease, diabetes, osteopenia/osteoporosis, and glaucoma.  As appropriate for age/gender, discussed screening for colorectal cancer, prostate cancer, breast cancer, and cervical cancer. Checklist reviewing preventive services available has been given to the patient.    Reviewed patients plan of care and provided an AVS. The Intermediate Care Plan ( asthma action plan, low back pain action plan, and migraine action plan) for Marisol meets the Care Plan requirement. This Care Plan has been established and reviewed with the Patient.    Counseling Resources:  ATP IV Guidelines  Pooled Cohorts Equation Calculator  Breast Cancer Risk Calculator  Breast Cancer: Medication to Reduce Risk  FRAX Risk Assessment  ICSI Preventive Guidelines  Dietary Guidelines for Americans, 2010  USDA's MyPlate  ASA Prophylaxis  Lung CA Screening    Kizzy Thomason, " MD [facility]    Identified Health Risks:

## 2022-06-09 NOTE — PATIENT INSTRUCTIONS
Patient Education   Personalized Prevention Plan  You are due for the preventive services outlined below.  Your care team is available to assist you in scheduling these services.  If you have already completed any of these items, please share that information with your care team to update in your medical record.  Health Maintenance Due   Topic Date Due    ANNUAL REVIEW OF HM ORDERS  Never done    Discuss Advance Care Planning  07/27/2021    COVID-19 Vaccine (4 - Booster for Moderna series) 03/14/2022          Continue all medications, increase potassium rich foods.

## 2022-06-13 ASSESSMENT — ENCOUNTER SYMPTOMS
ARTHRALGIAS: 1
BREAST MASS: 0
SORE THROAT: 0
HEMATOCHEZIA: 0
JOINT SWELLING: 0
EYE PAIN: 0
MYALGIAS: 0
FEVER: 0
CHILLS: 0
DIARRHEA: 0
WEAKNESS: 0
DYSURIA: 0
HEMATURIA: 0
HEADACHES: 0
SHORTNESS OF BREATH: 0
CONSTIPATION: 0
PALPITATIONS: 0
FREQUENCY: 0
ABDOMINAL PAIN: 0
PARESTHESIAS: 0
COUGH: 0
NERVOUS/ANXIOUS: 0
NAUSEA: 0
DIZZINESS: 0
HEARTBURN: 0

## 2022-06-13 ASSESSMENT — ACTIVITIES OF DAILY LIVING (ADL): CURRENT_FUNCTION: NO ASSISTANCE NEEDED

## 2022-06-15 ASSESSMENT — ENCOUNTER SYMPTOMS
EYE PAIN: 0
PALPITATIONS: 0
JOINT SWELLING: 0
BREAST MASS: 0
WEAKNESS: 0
FREQUENCY: 0
FEVER: 0
PARESTHESIAS: 0
HEARTBURN: 0
SHORTNESS OF BREATH: 0
HEMATOCHEZIA: 0
NERVOUS/ANXIOUS: 0
DIARRHEA: 0
SORE THROAT: 0
HEMATURIA: 0
ABDOMINAL PAIN: 0
CHILLS: 0
ARTHRALGIAS: 1
NAUSEA: 0
COUGH: 0
DYSURIA: 0
MYALGIAS: 0
CONSTIPATION: 0
HEADACHES: 0
DIZZINESS: 0

## 2022-06-15 ASSESSMENT — ACTIVITIES OF DAILY LIVING (ADL): CURRENT_FUNCTION: NO ASSISTANCE NEEDED

## 2022-06-16 ENCOUNTER — OFFICE VISIT (OUTPATIENT)
Dept: FAMILY MEDICINE | Facility: CLINIC | Age: 72
End: 2022-06-16
Payer: MEDICARE

## 2022-06-16 VITALS
TEMPERATURE: 98.4 F | SYSTOLIC BLOOD PRESSURE: 122 MMHG | BODY MASS INDEX: 33.17 KG/M2 | WEIGHT: 187.2 LBS | HEIGHT: 63 IN | HEART RATE: 86 BPM | OXYGEN SATURATION: 96 % | DIASTOLIC BLOOD PRESSURE: 70 MMHG | RESPIRATION RATE: 18 BRPM

## 2022-06-16 DIAGNOSIS — I10 HYPERTENSION GOAL BP (BLOOD PRESSURE) < 140/90: ICD-10-CM

## 2022-06-16 DIAGNOSIS — Z00.00 ENCOUNTER FOR MEDICARE ANNUAL WELLNESS EXAM: Primary | ICD-10-CM

## 2022-06-16 PROCEDURE — 99213 OFFICE O/P EST LOW 20 MIN: CPT | Mod: 25 | Performed by: FAMILY MEDICINE

## 2022-06-16 PROCEDURE — G0439 PPPS, SUBSEQ VISIT: HCPCS | Performed by: FAMILY MEDICINE

## 2022-06-16 RX ORDER — LISINOPRIL 10 MG/1
10 TABLET ORAL DAILY
Qty: 90 TABLET | Refills: 1 | Status: SHIPPED | OUTPATIENT
Start: 2022-06-16 | End: 2022-12-09

## 2022-06-16 RX ORDER — TRIAMTERENE AND HYDROCHLOROTHIAZIDE 37.5; 25 MG/1; MG/1
1 CAPSULE ORAL EVERY MORNING
Qty: 90 CAPSULE | Refills: 1 | Status: SHIPPED | OUTPATIENT
Start: 2022-06-16 | End: 2022-12-09

## 2022-06-16 ASSESSMENT — PAIN SCALES - GENERAL: PAINLEVEL: NO PAIN (0)

## 2022-06-16 NOTE — NURSING NOTE
"Chief Complaint   Patient presents with     Wellness Visit       Initial BP (!) 154/79   Pulse 86   Temp 98.4  F (36.9  C) (Oral)   Resp 18   Ht 1.588 m (5' 2.5\")   Wt 84.9 kg (187 lb 3.2 oz)   SpO2 96%   BMI 33.69 kg/m   Estimated body mass index is 33.69 kg/m  as calculated from the following:    Height as of this encounter: 1.588 m (5' 2.5\").    Weight as of this encounter: 84.9 kg (187 lb 3.2 oz).  Medication Reconciliation: complete  Felix Jones CMA    "

## 2022-08-29 NOTE — NURSING NOTE
Chief Complaint   Patient presents with     Consult     Lumbar Radiculopathy     Christina FARIAS   no

## 2022-11-04 ENCOUNTER — TELEPHONE (OUTPATIENT)
Dept: FAMILY MEDICINE | Facility: CLINIC | Age: 72
End: 2022-11-04

## 2022-11-04 DIAGNOSIS — M25.569 CHRONIC KNEE PAIN, UNSPECIFIED LATERALITY: Primary | ICD-10-CM

## 2022-11-04 DIAGNOSIS — G89.29 CHRONIC KNEE PAIN, UNSPECIFIED LATERALITY: Primary | ICD-10-CM

## 2022-11-04 NOTE — TELEPHONE ENCOUNTER
Recommend pt go to orthopedics, referral placed.  They can determine best imaging option if it is needed.  Pt should keep appt with me until after ortho appt in case she needs it, can cancel if not needed.

## 2022-11-04 NOTE — TELEPHONE ENCOUNTER
Reason for Call: Request for an order or referral:    Order or referral being requested: Imaging or sooner appointment    Date needed: before my next appointment    Has the patient been seen by the PCP for this problem? YES    Additional comments: Patient is experiencing knee pain and problems and has an appointment with you on 11/30/22. Patient is calling stating that she thinks she will need an MRI, xray or imaging of some sort and was hoping she could get that done before her appointment with you or if she could be seen sooner, or at least a referral to go else where such as orthopedics.   Please advise      Phone number Patient can be reached at:  Cell number on file:    Telephone Information:   Mobile [phone]       Best Time:  anytime    Can we leave a detailed message on this number?  YES    Call taken on 11/4/2022 at 10:50 AM by Sarah Barton

## 2022-11-18 NOTE — TELEPHONE ENCOUNTER
Action    Action Taken Per patient, she walks a lot and back in September she was about 3 miles from home when her RT knee just started hurting really bad. She finished the walk home and has tried little walks here and there, but pain is really bad. States knee hurts when she sleeps, so she has been sleeping in a recliner. Pain is located RT inner knee. No imaging, has not been see.         DIAGNOSIS: RT knee   APPOINTMENT DATE: 11/30/2022    NOTES STATUS DETAILS   OFFICE NOTE from referring provider N/A    OFFICE NOTE from other specialist N/A    DISCHARGE SUMMARY from hospital N/A    DISCHARGE REPORT from the ER N/A    OPERATIVE REPORT N/A    EMG report N/A    MEDICATION LIST N/A    MRI N/A    DEXA (osteoporosis/bone health) N/A    CT SCAN N/A    XRAYS (IMAGES & REPORTS) N/A

## 2022-11-22 DIAGNOSIS — M25.561 RIGHT KNEE PAIN: Primary | ICD-10-CM

## 2022-11-30 ENCOUNTER — ANCILLARY PROCEDURE (OUTPATIENT)
Dept: GENERAL RADIOLOGY | Facility: CLINIC | Age: 72
End: 2022-11-30
Attending: FAMILY MEDICINE
Payer: MEDICARE

## 2022-11-30 ENCOUNTER — PRE VISIT (OUTPATIENT)
Dept: ORTHOPEDICS | Facility: CLINIC | Age: 72
End: 2022-11-30

## 2022-11-30 ENCOUNTER — OFFICE VISIT (OUTPATIENT)
Dept: ORTHOPEDICS | Facility: CLINIC | Age: 72
End: 2022-11-30
Payer: MEDICARE

## 2022-11-30 DIAGNOSIS — M25.561 RIGHT KNEE PAIN: ICD-10-CM

## 2022-11-30 DIAGNOSIS — G89.29 CHRONIC KNEE PAIN, UNSPECIFIED LATERALITY: ICD-10-CM

## 2022-11-30 DIAGNOSIS — M25.569 CHRONIC KNEE PAIN, UNSPECIFIED LATERALITY: ICD-10-CM

## 2022-11-30 PROCEDURE — 73564 X-RAY EXAM KNEE 4 OR MORE: CPT | Mod: RT | Performed by: RADIOLOGY

## 2022-11-30 PROCEDURE — 99203 OFFICE O/P NEW LOW 30 MIN: CPT | Performed by: FAMILY MEDICINE

## 2022-11-30 NOTE — PATIENT INSTRUCTIONS
Try using topical diclofenac (Voltaren gel) on the knee 3-4 times daily as needed for pain  May consider use of a simple knee sleeve with a patellar cutout for comfort  You may perform the home exercises provided.  Consider following up with physical therapy  Contact our clinic if you like to try a steroid or viscosupplementation (gel) injection into your knee    Knee Exercises     You may do the first 7 exercises right away. You may do the rest of the exercises when the pain in your knee has decreased.    Passive knee extension: Do this exercise if you are unable to extend your knee fully. While lying on your back, place a rolled-up towel under the heel of your injured leg so the heel is about 6 inches off the ground. Relax your leg muscles and let gravity slowly straighten your knee. Try to hold this position for 2 minutes. Repeat 3 times. You may feel some discomfort while doing this exercise. Do the exercise several times a day.  This exercise can also be done while sitting in a chair with your heel on another chair or stool.    Heel slide: Sit on a firm surface with your legs straight in front of you. Slowly slide the heel of the foot on your injured side toward your buttock by pulling your knee toward your chest as you slide the heel. Return to the starting position. Do 2 sets of 15.  Standing calf stretch: Stand facing a wall with your hands on the wall at about eye level. Keep your injured leg back with your heel on the floor. Keep the other leg forward with the knee bent. Turn your back foot slightly inward (as if you were pigeon-toed). Slowly lean into the wall until you feel a stretch in the back of your calf. Hold the stretch for 15 to 30 seconds. Return to the starting position. Repeat 3 times. Do this exercise several times each day.    Hamstring stretch on wall: Lie on your back with your buttocks close to a doorway. Stretch your uninjured leg straight out in front of you on the floor through the  doorway. Raise your injured leg and rest it against the wall next to the door frame. Keep your leg as straight as possible. You should feel a stretch in the back of your thigh. Hold this position for 15 to 30 seconds. Repeat 3 times.  Straight leg raise: Lie on your back with your legs straight out in front of you. Bend the knee on your uninjured side and place the foot flat on the floor. Tighten the thigh muscle on your injured side and lift your leg about 8 inches off the floor. Keep your leg straight and your thigh muscle tight. Slowly lower your leg back down to the floor. Do 2 sets of 15.    Prone hip extension: Lie on your stomach with your legs straight out behind you. Fold your arms under your head and rest your head on your arms. Draw your belly button in towards your spine and tighten your abdominal muscles. Tighten the buttocks and thigh muscles of the leg on your injured side and lift the leg off the floor about 8 inches. Keep your leg straight. Hold for 5 seconds. Then lower your leg and relax. Do 2 sets of 15.  Clam exercise: Lie on your uninjured side with your hips and knees bent and feet together. Slowly raise your top leg toward the ceiling while keeping your heels touching each other. Hold for 2 seconds and lower slowly. Do 2 sets of 15 repetitions.    Wall squat with a ball: Stand with your back, shoulders, and head against a wall. Look straight ahead. Keep your shoulders relaxed and your feet 3 feet (90 centimeters) from the wall and shoulder's width apart. Place a soccer or basketball-sized ball behind your back. Keeping your back against the wall, slowly squat down to a 45-degree angle. Your thighs will not yet be parallel to the floor. Hold this position for 10 seconds and then slowly slide back up the wall. Repeat 10 times. Build up to 2 sets of 15.  Step-up: Stand with the foot of your injured leg on a support 3 to 5 inches (8 to 13 centimeters) high --like a small step or block of wood.  Keep your other foot flat on the floor. Shift your weight onto the injured leg on the support. Straighten your injured leg as the other leg comes off the floor. Return to the starting position by bending your injured leg and slowly lowering your uninjured leg back to the floor. Do 2 sets of 15.    Knee stabilization: Wrap a piece of elastic tubing around the ankle of your uninjured leg. Tie a knot in the other end of the tubing and close it in a door at about ankle height.  Stand facing the door on the leg without tubing (your injured leg) and bend your knee slightly, keeping your thigh muscles tight. Stay in this position while you move the leg with the tubing (the uninjured leg) straight back behind you. Do 2 sets of 15.  Turn 90 degrees so the leg without tubing is closest to the door. Move the leg with tubing away from your body. Do 2 sets of 15.  Turn 90 degrees again so your back is to the door. Move the leg with tubing straight out in front of you. Do 2 sets of 15.  Turn your body 90 degrees again so the leg with tubing is closest to the door. Move the leg with tubing across your body. Do 2 sets of 15.  Hold onto a chair if you need help balancing. This exercise can be made more challenging by standing on a firm pillow or foam mat while you move the leg with tubing.    Resisted terminal knee extension: Make a loop with a piece of elastic tubing by tying a knot in both ends. Close the knot in a door at knee height. Step into the loop with your injured leg so the tubing is around the back of your knee. Lift the other foot off the ground and hold onto a chair for balance, if needed. Bend the knee with tubing about 45 degrees. Slowly straighten your leg, keeping your thigh muscle tight as you do this. Repeat 15 times. Do 2 sets of 15. If you need an easier way to do this, stand on both legs for better support while you do the exercise.  If you have access to a [employer] board, do the following  exercises:            Developed by [employer].  Published by [employer].  Copyright  2014 [employer] and/or one of its subsidiaries. All rights reserved.

## 2022-11-30 NOTE — LETTER
11/30/2022         RE: Marisol Orta  [address] [address]        Dear Colleague,    Thank you for referring your patient, Marisol Orta, to the [facility] [place]. Please see a copy of my visit note below.      [facility]  SPORTS MEDICINE CLINIC VISIT     Nov 30, 2022        ASSESSMENT & PLAN    72-year-old with right knee pain secondary to osteoarthritis of the knee    Reviewed imaging and assessment with patient in detail  Try using topical diclofenac (Voltaren gel) on the knee 3-4 times daily as needed for pain  May consider use of a simple knee sleeve with a patellar cutout for comfort  You may perform the home exercises provided.  Consider following up with physical therapy  Contact our clinic if you like to try a steroid or viscosupplementation (gel) injection into your knee    Kishan Cruz MD  [facility] SPOR[facility] MEDICINE [facility]    -----  Chief Complaint   Patient presents with     Consult For     Right knee pain        SUBJECTIVE  Marisol Orta is a/an 72 year old female who is seen as a self referral for evaluation of right knee pain.     The patient is seen by themselves.  The patient is Right handed    Onset: 3-4 month(s) ago. Reports insidious onset without acute precipitating event.  Location of Pain: right knee - medial aspect but can move posteriorly   Worsened by: Activity, laying down at night, twisting on knee  Better with: Rest   Treatments tried: rest/activity avoidance and Tylenol Gel injection 20+ years ago as preventive.   Associated symptoms: locking or catching and feeling of instability    Orthopedic/Surgical history: NO  Social History/Occupation: Retired      REVIE-W OF SYSTEMS:    Do you have fever, chills, weight loss? No    Do you have any vision problems? No    Do you have any chest pain or edema? No    Do you have any shortness of breath or wheezing?  No    Do you have stomach problems?  No    Do you have any numbness or focal weakness? No    Do you have diabetes? No    Do you have problems with bleeding or clotting? No    Do you have an rashes or other skin lesions? No    OBJECTIVE:  There were no vitals taken for this visit.     Patient is alert, No acute distress, pleasant and conversational.      right knee:   Skin intact. No erythema or ecchymosis.  No effusion or soft tissue swelling.    AROM: Zero to approximately 135  without restriction or reported pain.    Palpation: No medial or lateral facet joint tenderness.  TTP along the medial joint line.  No TTP along the lateral joint line    Special Tests:    No ligamentous laxity or pain with valgus or varus stress.  Negative Lachman's, Anterior Drawer and Posterior Drawer     Full Isometric quad strength, extensor mechanism in place     Neurovascularly intact in the lower extremity    Hip and Ankle with full AROM and nontender      RADIOLOGY:    4 view x-rays of the right knee are performed and reviewed independently demonstrating no acute fracture or dislocation.  At least moderate DJD in the medial compartment of the right knee with mild to minimal degenerative change throughout the remaining knee.  See EMR for formal radiology report          Again, thank you for allowing me to participate in the care of your patient.        Sincerely,        Kishan Cruz MD

## 2022-11-30 NOTE — PROGRESS NOTES
[facility]  SPORTS MEDICINE CLINIC VISIT     Nov 30, 2022        ASSESSMENT & PLAN    72-year-old with right knee pain secondary to osteoarthritis of the knee    Reviewed imaging and assessment with patient in detail  Try using topical diclofenac (Voltaren gel) on the knee 3-4 times daily as needed for pain  May consider use of a simple knee sleeve with a patellar cutout for comfort  You may perform the home exercises provided.  Consider following up with physical therapy  Contact our clinic if you like to try a steroid or viscosupplementation (gel) injection into your knee    Kishan Cruz MD  [facility]IC[facility] [place]    -----  Chief Complaint   Patient presents with     Consult For     Right knee pain        SUBJECTIVE  Marisol Orta is a/an 72 year old female who is seen as a self referral for evaluation of right knee pain.     The patient is seen by themselves.  The patient is Right handed    Onset: 3-4 month(s) ago. Reports insidious onset without acute precipitating event.  Location of Pain: right knee - medial aspect but can move posteriorly   Worsened by: Activity, laying down at night, twisting on knee  Better with: Rest   Treatments tried: rest/activity avoidance and Tylenol Gel injection 20+ years ago as preventive.   Associated symptoms: locking or catching and feeling of instability    Orthopedic/Surgical history: NO  Social History/Occupation: Retired      REVIE-W OF SYSTEMS:    Do you have fever, chills, weight loss? No    Do you have any vision problems? No    Do you have any chest pain or edema? No    Do you have any shortness of breath or wheezing?  No    Do you have stomach problems? No    Do you have any numbness or focal weakness? No    Do you have diabetes? No    Do you have problems with bleeding or clotting? No    Do you have an rashes or other skin lesions? No    OBJECTIVE:  There were no vitals taken for this visit.     Patient is alert, No acute  distress, pleasant and conversational.      right knee:   Skin intact. No erythema or ecchymosis.  No effusion or soft tissue swelling.    AROM: Zero to approximately 135  without restriction or reported pain.    Palpation: No medial or lateral facet joint tenderness.  TTP along the medial joint line.  No TTP along the lateral joint line    Special Tests:    No ligamentous laxity or pain with valgus or varus stress.  Negative Lachman's, Anterior Drawer and Posterior Drawer     Full Isometric quad strength, extensor mechanism in place     Neurovascularly intact in the lower extremity    Hip and Ankle with full AROM and nontender      RADIOLOGY:    4 view x-rays of the right knee are performed and reviewed independently demonstrating no acute fracture or dislocation.  At least moderate DJD in the medial compartment of the right knee with mild to minimal degenerative change throughout the remaining knee.  See EMR for formal radiology report

## 2022-12-09 DIAGNOSIS — I10 HYPERTENSION GOAL BP (BLOOD PRESSURE) < 140/90: ICD-10-CM

## 2022-12-09 RX ORDER — LISINOPRIL 10 MG/1
TABLET ORAL
Qty: 90 TABLET | Refills: 1 | Status: SHIPPED | OUTPATIENT
Start: 2022-12-09 | End: 2023-05-31

## 2022-12-09 RX ORDER — TRIAMTERENE AND HYDROCHLOROTHIAZIDE 37.5; 25 MG/1; MG/1
1 CAPSULE ORAL EVERY MORNING
Qty: 90 CAPSULE | Refills: 1 | Status: SHIPPED | OUTPATIENT
Start: 2022-12-09 | End: 2023-05-31

## 2022-12-19 ENCOUNTER — LAB (OUTPATIENT)
Dept: LAB | Facility: CLINIC | Age: 72
End: 2022-12-19
Payer: MEDICARE

## 2022-12-19 DIAGNOSIS — I10 HYPERTENSION GOAL BP (BLOOD PRESSURE) < 140/90: ICD-10-CM

## 2022-12-19 LAB
ANION GAP SERPL CALCULATED.3IONS-SCNC: 6 MMOL/L (ref 3–14)
BUN SERPL-MCNC: 18 MG/DL (ref 7–30)
CALCIUM SERPL-MCNC: 9.3 MG/DL (ref 8.5–10.1)
CHLORIDE BLD-SCNC: 106 MMOL/L (ref 94–109)
CO2 SERPL-SCNC: 29 MMOL/L (ref 20–32)
CREAT SERPL-MCNC: 0.74 MG/DL (ref 0.52–1.04)
GFR SERPL CREATININE-BSD FRML MDRD: 87 ML/MIN/1.73M2
GLUCOSE BLD-MCNC: 111 MG/DL (ref 70–99)
POTASSIUM BLD-SCNC: 3.4 MMOL/L (ref 3.4–5.3)
SODIUM SERPL-SCNC: 141 MMOL/L (ref 133–144)

## 2022-12-19 PROCEDURE — 36415 COLL VENOUS BLD VENIPUNCTURE: CPT

## 2022-12-19 PROCEDURE — 80048 BASIC METABOLIC PNL TOTAL CA: CPT

## 2023-05-30 ENCOUNTER — OFFICE VISIT (OUTPATIENT)
Dept: OPTOMETRY | Facility: CLINIC | Age: 73
End: 2023-05-30
Payer: MEDICARE

## 2023-05-30 DIAGNOSIS — Z96.1 PSEUDOPHAKIA OF BOTH EYES: Primary | ICD-10-CM

## 2023-05-30 DIAGNOSIS — H52.4 PRESBYOPIA: ICD-10-CM

## 2023-05-30 DIAGNOSIS — H26.8 PXF (PSEUDOEXFOLIATION OF LENS CAPSULE): ICD-10-CM

## 2023-05-30 DIAGNOSIS — H52.223 REGULAR ASTIGMATISM OF BOTH EYES: ICD-10-CM

## 2023-05-30 DIAGNOSIS — H26.491 POSTERIOR CAPSULAR OPACIFICATION NON VISUALLY SIGNIFICANT OF RIGHT EYE: ICD-10-CM

## 2023-05-30 PROCEDURE — 92015 DETERMINE REFRACTIVE STATE: CPT | Mod: GY | Performed by: OPTOMETRIST

## 2023-05-30 PROCEDURE — 92014 COMPRE OPH EXAM EST PT 1/>: CPT | Performed by: OPTOMETRIST

## 2023-05-30 ASSESSMENT — REFRACTION_WEARINGRX
OS_CYLINDER: +1.00
OS_SPHERE: -0.25
OD_CYLINDER: +1.00
OD_AXIS: 045
OS_AXIS: 115
OS_ADD: +2.75
SPECS_TYPE: PAL
OD_ADD: +2.75
OD_SPHERE: -0.50

## 2023-05-30 ASSESSMENT — REFRACTION_MANIFEST
OD_CYLINDER: +1.50
OS_ADD: +2.75
OS_AXIS: 110
OD_SPHERE: -1.25
OD_CYLINDER: +1.25
OD_ADD: +2.75
OS_SPHERE: -1.25
OS_AXIS: 111
OS_SPHERE: -0.50
OD_SPHERE: -0.50
OD_AXIS: 045
OS_CYLINDER: +1.00
OD_AXIS: 037
OS_CYLINDER: +1.75

## 2023-05-30 ASSESSMENT — KERATOMETRY
OD_AXISANGLE_DEGREES: 134
OD_K1POWER_DIOPTERS: 43.25
OS_K2POWER_DIOPTERS: 44.25
OS_AXISANGLE2_DEGREES: 013
OD_K2POWER_DIOPTERS: 42.25
OD_AXISANGLE2_DEGREES: 044
OS_AXISANGLE_DEGREES: 103
OS_K1POWER_DIOPTERS: 42.50

## 2023-05-30 ASSESSMENT — CONF VISUAL FIELD
OD_INFERIOR_TEMPORAL_RESTRICTION: 0
OD_SUPERIOR_TEMPORAL_RESTRICTION: 0
OD_SUPERIOR_NASAL_RESTRICTION: 0
OD_INFERIOR_NASAL_RESTRICTION: 0
OS_SUPERIOR_TEMPORAL_RESTRICTION: 0
METHOD: COUNTING FINGERS
OS_SUPERIOR_NASAL_RESTRICTION: 0
OD_NORMAL: 1
OS_NORMAL: 1
OS_INFERIOR_TEMPORAL_RESTRICTION: 0
OS_INFERIOR_NASAL_RESTRICTION: 0

## 2023-05-30 ASSESSMENT — VISUAL ACUITY
CORRECTION_TYPE: GLASSES
METHOD: SNELLEN - LINEAR
OD_CC: 20/20
OS_CC: J1+
OS_CC: 20/25
OS_CC+: -1
OD_CC: J1+

## 2023-05-30 ASSESSMENT — EXTERNAL EXAM - RIGHT EYE: OD_EXAM: NORMAL

## 2023-05-30 ASSESSMENT — CUP TO DISC RATIO
OD_RATIO: 0.2
OS_RATIO: 0.2

## 2023-05-30 ASSESSMENT — TONOMETRY
OS_IOP_MMHG: 14
OD_IOP_MMHG: 14
IOP_METHOD: APPLANATION

## 2023-05-30 ASSESSMENT — EXTERNAL EXAM - LEFT EYE: OS_EXAM: NORMAL

## 2023-05-30 ASSESSMENT — SLIT LAMP EXAM - LIDS
COMMENTS: NORMAL
COMMENTS: NORMAL

## 2023-05-30 ASSESSMENT — PACHYMETRY
OD_CT(UM): .605
OS_CT(UM): .601

## 2023-05-30 NOTE — PROGRESS NOTES
Chief Complaint   Patient presents with     Annual Eye Exam        Last Eye Exam: Dec 2021  Dilated Previously: Yes, side effects of dilation explained today    What are you currently using to see?  glasses       Distance Vision Acuity: Satisfied with vision    Near Vision Acuity: Satisfied with vision while reading  with glasses    Eye Comfort: good and dry  Do you use eye drops? : Yes: otc eye drops PRN needed rarely   Occupation or Hobbies: retired    Jewels Ohara, OA             Medical, surgical and family histories reviewed and updated 5/30/2023.       OBJECTIVE: See Ophthalmology exam    ASSESSMENT:    ICD-10-CM    1. Pseudophakia of both eyes  Z96.1       2. 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 Presbyopia  H52.4       5. Regular astigmatism of both eyes  H52.223           PLAN:     Patient Instructions   Optional to fill new glasses prescription, minimal change  No signs of pseudoexfoliation glaucoma   Return to clinic 1 year to continue monitoring for signs of glaucoma   Return in 1 year for eye exam    Sindhu Leach, OD  [phone]

## 2023-05-30 NOTE — LETTER
5/30/2023         RE: Marisol Orta  [address] [address] [id number]        Dear Colleague,    Thank you for referring your patient, Marisol Orta, to the [facility]. Please see a copy of my visit note below.    Chief Complaint   Patient presents with     Annual Eye Exam        Last Eye Exam: Dec 2021  Dilated Previously: Yes, side effects of dilation explained today    What are you currently using to see?  glasses       Distance Vision Acuity: Satisfied with vision    Near Vision Acuity: Satisfied with vision while reading  with glasses    Eye Comfort: good and dry  Do you use eye drops? : Yes: otc eye drops PRN needed rarely   Occupation or Hobbies: retired    Jewels Ohara, OA             Medical, surgical and family histories reviewed and updated 5/30/2023.       OBJECTIVE: See Ophthalmology exam    ASSESSMENT:    ICD-10-CM    1. Pseudophakia of both eyes  Z96.1       2. 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 Presbyopia  H52.4       5. Regular astigmatism of both eyes  H52.223           PLAN:     Patient Instructions   Optional to fill new glasses prescription, minimal change  No signs of pseudoexfoliation glaucoma   Return to clinic 1 year to continue monitoring for signs of glaucoma   Return in 1 year for eye exam    Sindhu Leach, OD  [phone]                      Again, thank you for allowing me to participate in the care of your patient.        Sincerely,        Sindhu Leach, OD

## 2023-05-30 NOTE — PATIENT INSTRUCTIONS
Optional to fill new glasses prescription, minimal change  No signs of pseudoexfoliation glaucoma   Return to clinic 1 year to continue monitoring for signs of glaucoma   Return in 1 year for eye exam    Sindhu Leach, OD  [phone]

## 2023-05-31 DIAGNOSIS — I10 HYPERTENSION GOAL BP (BLOOD PRESSURE) < 140/90: ICD-10-CM

## 2023-05-31 RX ORDER — TRIAMTERENE AND HYDROCHLOROTHIAZIDE 37.5; 25 MG/1; MG/1
1 CAPSULE ORAL EVERY MORNING
Qty: 45 CAPSULE | Refills: 0 | Status: SHIPPED | OUTPATIENT
Start: 2023-05-31 | End: 2023-07-12

## 2023-05-31 RX ORDER — LISINOPRIL 10 MG/1
TABLET ORAL
Qty: 45 TABLET | Refills: 0 | Status: SHIPPED | OUTPATIENT
Start: 2023-05-31 | End: 2023-07-12

## 2023-06-16 ENCOUNTER — DOCUMENTATION ONLY (OUTPATIENT)
Dept: LAB | Facility: CLINIC | Age: 73
End: 2023-06-16
Payer: MEDICARE

## 2023-06-16 DIAGNOSIS — I10 HYPERTENSION GOAL BP (BLOOD PRESSURE) < 140/90: Primary | ICD-10-CM

## 2023-06-16 DIAGNOSIS — Z13.6 CARDIOVASCULAR SCREENING; LDL GOAL LESS THAN 160: ICD-10-CM

## 2023-06-16 NOTE — PROGRESS NOTES
Marisol CRAWFORD Dannyjulieta has an upcoming lab appointment:    Future Appointments   Date Time Provider Department Center   6/28/2023  7:45 AM [facility]   7/12/2023  8:25 AM Kizzy Thomason MD [facility]       There is no order available. Please review and place either future orders or HMPO (Review of Health Maintenance Protocol Orders), as appropriate.    Health Maintenance Due   Topic     ANNUAL REVIEW OF HM ORDERS      NIKHIL CARTER

## 2023-06-28 ENCOUNTER — LAB (OUTPATIENT)
Dept: LAB | Facility: CLINIC | Age: 73
End: 2023-06-28
Payer: MEDICARE

## 2023-06-28 DIAGNOSIS — I10 HYPERTENSION GOAL BP (BLOOD PRESSURE) < 140/90: ICD-10-CM

## 2023-06-28 DIAGNOSIS — Z13.6 CARDIOVASCULAR SCREENING; LDL GOAL LESS THAN 160: ICD-10-CM

## 2023-06-28 LAB
ANION GAP SERPL CALCULATED.3IONS-SCNC: 10 MMOL/L (ref 7–15)
BUN SERPL-MCNC: 14 MG/DL (ref 8–23)
CALCIUM SERPL-MCNC: 9.5 MG/DL (ref 8.8–10.2)
CHLORIDE SERPL-SCNC: 104 MMOL/L (ref 98–107)
CHOLEST SERPL-MCNC: 205 MG/DL
CREAT SERPL-MCNC: 0.78 MG/DL (ref 0.51–0.95)
DEPRECATED HCO3 PLAS-SCNC: 28 MMOL/L (ref 22–29)
GFR SERPL CREATININE-BSD FRML MDRD: 81 ML/MIN/1.73M2
GLUCOSE SERPL-MCNC: 98 MG/DL (ref 70–99)
HDLC SERPL-MCNC: 68 MG/DL
LDLC SERPL CALC-MCNC: 114 MG/DL
NONHDLC SERPL-MCNC: 137 MG/DL
POTASSIUM SERPL-SCNC: 3.9 MMOL/L (ref 3.4–5.3)
SODIUM SERPL-SCNC: 142 MMOL/L (ref 136–145)
TRIGL SERPL-MCNC: 115 MG/DL

## 2023-06-28 PROCEDURE — 36415 COLL VENOUS BLD VENIPUNCTURE: CPT

## 2023-06-28 PROCEDURE — 80048 BASIC METABOLIC PNL TOTAL CA: CPT

## 2023-06-28 PROCEDURE — 80061 LIPID PANEL: CPT

## 2023-07-05 ASSESSMENT — ENCOUNTER SYMPTOMS
HEMATURIA: 0
PALPITATIONS: 0
CHILLS: 0
NERVOUS/ANXIOUS: 0
BREAST MASS: 0
SORE THROAT: 0
HEMATOCHEZIA: 0
PARESTHESIAS: 0
DIARRHEA: 0
NAUSEA: 0
DYSURIA: 0
FEVER: 0
WEAKNESS: 0
ABDOMINAL PAIN: 0
FREQUENCY: 0
DIZZINESS: 0
SHORTNESS OF BREATH: 0
CONSTIPATION: 0
ARTHRALGIAS: 1
COUGH: 0
EYE PAIN: 0
HEARTBURN: 0
HEADACHES: 0
JOINT SWELLING: 0
MYALGIAS: 0

## 2023-07-05 ASSESSMENT — ACTIVITIES OF DAILY LIVING (ADL): CURRENT_FUNCTION: NO ASSISTANCE NEEDED

## 2023-07-12 ENCOUNTER — OFFICE VISIT (OUTPATIENT)
Dept: FAMILY MEDICINE | Facility: CLINIC | Age: 73
End: 2023-07-12
Payer: MEDICARE

## 2023-07-12 VITALS
OXYGEN SATURATION: 98 % | SYSTOLIC BLOOD PRESSURE: 134 MMHG | TEMPERATURE: 98.2 F | RESPIRATION RATE: 16 BRPM | BODY MASS INDEX: 32.71 KG/M2 | HEIGHT: 63 IN | DIASTOLIC BLOOD PRESSURE: 76 MMHG | WEIGHT: 184.6 LBS | HEART RATE: 78 BPM

## 2023-07-12 DIAGNOSIS — Z00.00 ENCOUNTER FOR MEDICARE ANNUAL WELLNESS EXAM: Primary | ICD-10-CM

## 2023-07-12 DIAGNOSIS — I10 HYPERTENSION GOAL BP (BLOOD PRESSURE) < 140/90: ICD-10-CM

## 2023-07-12 PROCEDURE — G0439 PPPS, SUBSEQ VISIT: HCPCS | Performed by: FAMILY MEDICINE

## 2023-07-12 PROCEDURE — 99213 OFFICE O/P EST LOW 20 MIN: CPT | Mod: 25 | Performed by: FAMILY MEDICINE

## 2023-07-12 RX ORDER — TRIAMTERENE AND HYDROCHLOROTHIAZIDE 37.5; 25 MG/1; MG/1
1 CAPSULE ORAL EVERY MORNING
Qty: 90 CAPSULE | Refills: 1 | Status: SHIPPED | OUTPATIENT
Start: 2023-07-12 | End: 2023-12-27

## 2023-07-12 RX ORDER — LISINOPRIL 10 MG/1
10 TABLET ORAL DAILY
Qty: 90 TABLET | Refills: 1 | Status: SHIPPED | OUTPATIENT
Start: 2023-07-12 | End: 2023-12-26

## 2023-07-12 ASSESSMENT — ACTIVITIES OF DAILY LIVING (ADL): CURRENT_FUNCTION: NO ASSISTANCE NEEDED

## 2023-07-12 ASSESSMENT — ENCOUNTER SYMPTOMS
HEMATOCHEZIA: 0
FEVER: 0
SORE THROAT: 0
NERVOUS/ANXIOUS: 0
HEARTBURN: 0
HEADACHES: 0
PALPITATIONS: 0
WEAKNESS: 0
FREQUENCY: 0
BREAST MASS: 0
HEMATURIA: 0
SHORTNESS OF BREATH: 0
EYE PAIN: 0
DIARRHEA: 0
DYSURIA: 0
ARTHRALGIAS: 1
PARESTHESIAS: 0
CHILLS: 0
ABDOMINAL PAIN: 0
COUGH: 0
JOINT SWELLING: 0
CONSTIPATION: 0
NAUSEA: 0
MYALGIAS: 0
DIZZINESS: 0

## 2023-07-12 ASSESSMENT — PAIN SCALES - GENERAL: PAINLEVEL: NO PAIN (0)

## 2023-07-12 NOTE — PATIENT INSTRUCTIONS
Patient Education   Personalized Prevention Plan  You are due for the preventive services outlined below.  Your care team is available to assist you in scheduling these services.  If you have already completed any of these items, please share that information with your care team to update in your medical record.  Health Maintenance Due   Topic Date Due    ANNUAL REVIEW OF HM ORDERS  Never done    Discuss Advance Care Planning  07/27/2021    COVID-19 Vaccine (5 - Moderna series) 01/13/2023

## 2023-07-12 NOTE — PROGRESS NOTES
"SUBJECTIVE:   Marisol is a 73 year old who presents for Preventive Visit.      7/12/2023     8:06 AM   Additional Questions   Roomed by Felix     Are you in the first 12 months of your Medicare coverage?  No    Healthy Habits:     In general, how would you rate your overall health?  Good    Frequency of exercise:  4-5 days/week    Duration of exercise:  45-60 minutes    Do you usually eat at least 4 servings of fruit and vegetables a day, include whole grains    & fiber and avoid regularly eating high fat or \"junk\" foods?  Yes    Taking medications regularly:  Yes    Medication side effects:  None    Ability to successfully perform activities of daily living:  No assistance needed    Home Safety:  No safety concerns identified    Hearing Impairment:  No hearing concerns    In the past 6 months, have you been bothered by leaking of urine?  No    In general, how would you rate your overall mental or emotional health?  Good    Additional concerns today:  No        Have you ever done Advance Care Planning? (For example, a Health Directive, POLST, or a discussion with a medical provider or your loved ones about your wishes): Has information from previous visit        Fall risk  Fallen 2 or more times in the past year?: No  Any fall with injury in the past year?: No    Cognitive Screening Normal cognition based on my direct observation during interview and exam.     Do you have sleep apnea, excessive snoring or daytime drowsiness?: no    Reviewed and updated as needed this visit by clinical staff   Tobacco  Allergies  Meds  Problems  Med Hx  Surg Hx  Fam Hx          Reviewed and updated as needed this visit by Provider   Tobacco  Allergies  Meds  Problems  Med Hx  Surg Hx  Fam Hx         Social History     Tobacco Use     Smoking status: Never     Smokeless tobacco: Never     Tobacco comments:     smoke free household   Substance Use Topics     Alcohol use: No             7/5/2023     7:54 PM   Alcohol Use "   Prescreen: >3 drinks/day or >7 drinks/week? No     Do you have a current opioid prescription? No  Do you use any other controlled substances or medications that are not prescribed by a provider? None              Current providers sharing in care for this patient include:   Patient Care Team:  Kizzy Thomason MD as PCP - General (Family Practice)  Kizzy Thomason MD as Assigned PCP  Dilma Soliz MD as MD (Dermatology)  Sindhu Leach OD as Assigned Surgical Provider  Kishan Cruz MD as Assigned Musculoskeletal Provider  Sindhu Leach OD (Optometry)    The following health maintenance items are reviewed in Epic and correct as of today:  Health Maintenance   Topic Date Due     ANNUAL REVIEW OF HM ORDERS  Never done     ADVANCE CARE PLANNING  07/27/2021     COVID-19 Vaccine (5 - Moderna series) 01/13/2023     MEDICARE ANNUAL WELLNESS VISIT  06/16/2023     INFLUENZA VACCINE (1) 09/01/2023     BMP  12/28/2023     MAMMO SCREENING  03/21/2024     EYE EXAM  05/30/2024     FALL RISK ASSESSMENT  07/12/2024     COLORECTAL CANCER SCREENING  09/12/2024     DEXA  07/31/2027     LIPID  06/28/2028     DTAP/TDAP/TD IMMUNIZATION (3 - Td or Tdap) 09/29/2030     HEPATITIS C SCREENING  Completed     PHQ-2 (once per calendar year)  Completed     Pneumococcal Vaccine: 65+ Years  Completed     ZOSTER IMMUNIZATION  Completed     IPV IMMUNIZATION  Aged Out     MENINGITIS IMMUNIZATION  Aged Out     G 2 P 2   No LMP recorded. Patient is postmenopausal.     Fasting: No   Td: Tdap 9/2020       Flu: 10/2021      Covid: biv 9/13/2022      Shingrix: done      PPV: done      NO - age 65 - see link Cervical Cytology Screening Guidelines               Cholesterol:   Lab Results   Component Value Date    CHOL 205 06/28/2023    CHOL 189 01/02/2019     Lab Results   Component Value Date    HDL 68 06/28/2023    HDL 73 01/02/2019     Lab Results   Component Value Date     06/28/2023    LDL 90 01/02/2019     Lab  Results   Component Value Date    TRIG 115 06/28/2023    TRIG 130 01/02/2019     Lab Results   Component Value Date    CHOLHDLRATIO 2.6 07/09/2014         MMG: 3/2022  Dexa:  7/2017     Flex/colo: 9/2019 q5y scope      Seat Belt: Yes    Sunscreen use: Yes   Calcium Intake: adeq  Health Care Directive: Yes   Sexually Active: Yes     Current contraception: none  History of abnormal Pap smear: No  Family history of colon/breast/ovarian cancer: Yes: see [facility]  Regular self breast exam: Yes  History of abnormal mammogram: No      Labs reviewed in EPIC  BP Readings from Last 3 Encounters:   07/12/23 134/76   06/16/22 122/70   12/14/21 131/75    Wt Readings from Last 3 Encounters:   07/12/23 83.7 kg (184 lb 9.6 oz)   06/16/22 84.9 kg (187 lb 3.2 oz)   12/14/21 84.6 kg (186 lb 9.6 oz)                  Patient Active Problem List   Diagnosis     Back pain     Osteoarthritis     Hypertension goal BP (blood pressure) < 140/90     Vitreous membranes and strands     Advanced directives, counseling/discussion     CARDIOVASCULAR SCREENING; LDL GOAL LESS THAN 160     Combined form of age-related cataract, right eye     Pseudophakia of both eyes     Lumbar radiculopathy     PXF (pseudoexfoliation of lens capsule)     Past Surgical History:   Procedure Laterality Date     APPENDECTOMY       CATARACT IOL, RT/LT  2017    Dr Jiménez     COLONOSCOPY  2012?     COLONOSCOPY WITH CO2 INSUFFLATION N/A 9/12/2019    Procedure: COLONOSCOPY, WITH CO2 INSUFFLATION;  Surgeon: Jose Preston MD;  Location: [facility] OR     FOOT SURGERY      left foot 1st  fusion     ORTHOPEDIC SURGERY       PHACOEMULSIFICATION CLEAR CORNEA WITH STANDARD INTRAOCULAR LENS IMPLANT Left 6/6/2017    Procedure: PHACOEMULSIFICATION CLEAR CORNEA WITH STANDARD INTRAOCULAR LENS IMPLANT;  LEFT EYE PHACOEMULSIFICATION CLEAR CORNEA WITH STANDARD INTRAOCULAR LENS IMPLANT ;  Surgeon: Caprice Jiménez MD;  Location: [facility]     PHACOEMULSIFICATION CLEAR CORNEA WITH STANDARD  INTRAOCULAR LENS IMPLANT Right 6/20/2017    Procedure: PHACOEMULSIFICATION CLEAR CORNEA WITH STANDARD INTRAOCULAR LENS IMPLANT;  RIGHT EYE PHACOEMULSIFICATION CLEAR CORNEA WITH STANDARD INTRAOCULAR LENS IMPLANT;  Surgeon: Caprice Jiménez MD;  [facility]       Social History     Tobacco Use     Smoking status: Never     Smokeless tobacco: Never     Tobacco comments:     smoke free household   Substance Use Topics     Alcohol use: No     Family History   Problem Relation Age of Onset     Lipids Mother      Cancer - colorectal Mother 70     Hypertension Mother      Arthritis Mother      Cancer Mother 84        lung cancer      Thyroid Disease Mother      Colon Cancer Mother      Other Cancer Mother      Arthritis Father      Hypertension Father      Macular Degeneration Father 85        monitors visison with [employer] grid, more trouble reading     Depression Father      Anxiety Disorder Father      Diabetes Maternal Grandmother      Diabetes Paternal Grandmother      Asthma Brother      Depression Brother      Anxiety Disorder Brother      Thyroid Disease Brother      Hypertension Sister      Thyroid Disease Sister      Eye Disorder Son 25        keratoconus?     Glaucoma Daughter 18        montoring pressure     Glaucoma Paternal Aunt      Diabetes Paternal Aunt      Hypertension Sister      Thyroid Disease Sister      Depression Brother      Anxiety Disorder Brother      Asthma Brother      Thyroid Disease Brother      Cerebrovascular Disease No family hx of          Current Outpatient Medications   Medication Sig Dispense Refill     CALCIUM 600+D PO 2 tablet daily       lisinopril (ZESTRIL) 10 MG tablet Take 1 tablet (10 mg) by mouth daily 90 tablet 1     Multiple Vitamins-Minerals (MULTIVITAMIN GUMMIES ADULT PO)        triamterene-HCTZ (DYAZIDE) 37.5-25 MG capsule Take 1 capsule by mouth every morning 90 capsule 1             Review of Systems   Constitutional: Negative for chills and fever.   HENT: Negative  "for congestion, ear pain, hearing loss and sore throat.    Eyes: Negative for pain and visual disturbance.   Respiratory: Negative for cough and shortness of breath.    Cardiovascular: Negative for chest pain, palpitations and peripheral edema.   Gastrointestinal: Negative for abdominal pain, constipation, diarrhea, heartburn, hematochezia and nausea.   Breasts:  Negative for tenderness, breast mass and discharge.   Genitourinary: Negative for dysuria, frequency, genital sores, hematuria, pelvic pain, urgency, vaginal bleeding and vaginal discharge.   Musculoskeletal: Positive for arthralgias. Negative for joint swelling and myalgias.   Skin: Negative for rash.   Neurological: Negative for dizziness, weakness, headaches and paresthesias.   Psychiatric/Behavioral: Negative for mood changes. The patient is not nervous/anxious.          OBJECTIVE:   /76   Pulse 78   Temp 98.2  F (36.8  C) (Oral)   Resp 16   Ht 1.6 m (5' 3\")   Wt 83.7 kg (184 lb 9.6 oz)   SpO2 98%   BMI 32.70 kg/m   Estimated body mass index is 32.7 kg/m  as calculated from the following:    Height as of this encounter: 1.6 m (5' 3\").    Weight as of this encounter: 83.7 kg (184 lb 9.6 oz).  Physical Exam  GENERAL APPEARANCE: healthy, alert and no distress  EYES: Eyes grossly normal to inspection, PERRL and conjunctivae and sclerae normal  HENT: ear canals and TM's normal, nose and mouth without ulcers or lesions, oropharynx clear and oral mucous membranes moist  NECK: no adenopathy, no asymmetry, masses, or scars and thyroid normal to palpation  RESP: lungs clear to auscultation - no rales, rhonchi or wheezes  BREAST: normal without masses, tenderness or nipple discharge and no palpable axillary masses or adenopathy  CV: regular rate and rhythm, normal S1 S2, no S3 or S4, no murmur, click or rub, no peripheral edema and peripheral pulses strong  ABDOMEN: soft, nontender, no hepatosplenomegaly, no masses and bowel sounds normal  MS: no " "musculoskeletal defects are noted and gait is age appropriate without ataxia  SKIN: no suspicious lesions or rashes  NEURO: Normal strength and tone, sensory exam grossly normal, mentation intact and speech normal  PSYCH: mentation appears normal and affect normal/bright    Diagnostic Test Results:  Labs reviewed in Epic    ASSESSMENT / PLAN:   (Z00.00) Encounter for Medicare annual wellness exam  (primary encounter diagnosis)  Comment: preventive needs reviewed   Plan: see orders in Epic.     (I10) Hypertension goal BP (blood pressure) < 140/90  Comment: to goal  Plan: lisinopril (ZESTRIL) 10 MG tablet,         triamterene-HCTZ (DYAZIDE) 37.5-25 MG capsule,         Basic metabolic panel  (Ca, Cl, CO2, Creat,         Gluc, K, Na, BUN), OFFICE/OUTPT VISIT,EST,LEVL         III         Refill x 6 months continue all meds  f/u 6 months for non-fasting labs             COUNSELING:  Reviewed preventive health counseling, as reflected in patient instructions  Special attention given to:       Regular exercise       Healthy diet/nutrition      BMI:   Estimated body mass index is 32.7 kg/m  as calculated from the following:    Height as of this encounter: 1.6 m (5' 3\").    Weight as of this encounter: 83.7 kg (184 lb 9.6 oz).   Weight management plan: Discussed healthy diet and exercise guidelines      She reports that she has never smoked. She has never used smokeless tobacco.      Appropriate preventive services were discussed with this patient, including applicable screening as appropriate for cardiovascular disease, diabetes, osteopenia/osteoporosis, and glaucoma.  As appropriate for age/gender, discussed screening for colorectal cancer, prostate cancer, breast cancer, and cervical cancer. Checklist reviewing preventive services available has been given to the patient.    Reviewed patients plan of care and provided an AVS. The Intermediate Care Plan ( asthma action plan, low back pain action plan, and migraine action " plan) for Marisol meets the Care Plan requirement. This Care Plan has been established and reviewed with the Patient.          Kizzy Thomason MD  [facility]    Identified Health Risks:    I have reviewed Opioid Use Disorder and Substance Use Disorder risk factors and made any needed referrals.

## 2023-12-08 ENCOUNTER — OFFICE VISIT (OUTPATIENT)
Dept: PEDIATRICS | Facility: CLINIC | Age: 73
End: 2023-12-08
Payer: MEDICARE

## 2023-12-08 ENCOUNTER — NURSE TRIAGE (OUTPATIENT)
Dept: FAMILY MEDICINE | Facility: CLINIC | Age: 73
End: 2023-12-08

## 2023-12-08 VITALS
RESPIRATION RATE: 17 BRPM | OXYGEN SATURATION: 98 % | SYSTOLIC BLOOD PRESSURE: 126 MMHG | WEIGHT: 184 LBS | DIASTOLIC BLOOD PRESSURE: 75 MMHG | TEMPERATURE: 97.6 F | HEIGHT: 63 IN | BODY MASS INDEX: 32.6 KG/M2 | HEART RATE: 110 BPM

## 2023-12-08 DIAGNOSIS — K61.2 ABSCESS OF ANAL AND RECTAL REGIONS: Primary | ICD-10-CM

## 2023-12-08 PROCEDURE — 87205 SMEAR GRAM STAIN: CPT | Performed by: FAMILY MEDICINE

## 2023-12-08 PROCEDURE — 99214 OFFICE O/P EST MOD 30 MIN: CPT | Performed by: FAMILY MEDICINE

## 2023-12-08 PROCEDURE — 87070 CULTURE OTHR SPECIMN AEROBIC: CPT | Performed by: FAMILY MEDICINE

## 2023-12-08 PROCEDURE — 87186 SC STD MICRODIL/AGAR DIL: CPT | Performed by: FAMILY MEDICINE

## 2023-12-08 PROCEDURE — 87077 CULTURE AEROBIC IDENTIFY: CPT | Mod: 59 | Performed by: FAMILY MEDICINE

## 2023-12-08 NOTE — TELEPHONE ENCOUNTER
I am not in clinic until Monday, this cannot wait,     RN please triage and consider sending to [facility]

## 2023-12-08 NOTE — TELEPHONE ENCOUNTER
Reason for Call:  Appointment Request    Patient requesting this type of appt:  office visit     Requested provider: Kizzy Thomason    Reason patient unable to be scheduled: Not within requested timeframe    When does patient want to be seen/preferred time: 1-2 days    Comments: patient called and thinks she may have a boil near the rectum area and is requesting to get worked in     Could we send this information to you in [facility] or would you prefer to receive a phone call?:   Patient would prefer a phone call   Okay to leave a detailed message?: Yes at Cell number on file:    Telephone Information:   Mobile [phone]       Call taken on 12/8/2023 at 8:33 AM by Vielka Martin

## 2023-12-08 NOTE — PROGRESS NOTES
Assessment & Plan     Abscess of anal and rectal regions: small (pea-sized) area of fluctuance that was simply drained through compression but leaving a larger superficial cellulitis in the area that would benefit from antibiotics. Augmentin sent. Culture sent of purulent drainage.   - Abscess Aerobic Bacterial Culture Routine With Gram Stain  - amoxicillin-clavulanate (AUGMENTIN) 875-125 MG tablet; Take 1 tablet by mouth 2 times daily for 7 days      I spent a total of 30 minutes on the day of the visit.   Time spent by me doing chart review, history and exam including minor procedure to unroof and drain abscess, documentation and further activities per the note           Return in about 1 week (around 12/15/2023) for with PCP if your symptoms are not improving, sooner if worsening.    Trudy Aguilera MD  [facility]    Paul Damon is a 73 year old, presenting for the following health issues:  Derm Problem (Boil Near Rectum on Right Side )      HPI     Skin Lesion  Onset/Duration: Pimple has been there for weeks, yesterday it started to get bigger and painful  Description  Location: near rectum on right side   Color: Red   Border description: raised, red, inflamed  Character: round, raised, painful, red  Itching: no  Bleeding:  YES- spots of blood after using the restroom   Intensity:  mild  Progression of Symptoms:  worsening  Accompanying signs and symptoms:   Bleeding: YES- when wiping self after going to bathroom today   Scaling: No  Excessive sun exposure/tanning: No  Sunscreen used: No  History:           Any previous history of skin cancer: No  Any family history of melanoma: YES, Sister and Daughter   Previous episodes of similar lesion: No  Precipitating or alleviating factors: None   Therapies tried and outcome: none        Review of Systems   No fevers, chills, malaise.      Objective    /75 (BP Location: Right arm, Patient Position: Sitting, Cuff Size:  "Adult Large)   Pulse 110   Temp 97.6  F (36.4  C) (Oral)   Resp 17   Ht 1.6 m (5' 3\")   Wt 83.5 kg (184 lb)   SpO2 98%   BMI 32.59 kg/m    Body mass index is 32.59 kg/m .  Physical Exam   Overall well appearing, no distress  There is a pea-sized area of fluctuance and surrounding cellulitis with 1x2cm oblong shape along the right inner skin in the gluteal cleft. It was easily expressed after manual un-rm the head - swabbed purulent drainage which was not substantial. Additional skin is firm, erythematous, warm and painful to touch - does not include the rectum which itself is normal in appearance.                       "

## 2023-12-12 LAB
BACTERIA ABSC ANAEROBE+AEROBE CULT: ABNORMAL
GRAM STAIN RESULT: ABNORMAL
GRAM STAIN RESULT: ABNORMAL

## 2023-12-25 DIAGNOSIS — I10 HYPERTENSION GOAL BP (BLOOD PRESSURE) < 140/90: ICD-10-CM

## 2023-12-26 RX ORDER — LISINOPRIL 10 MG/1
10 TABLET ORAL DAILY
Qty: 90 TABLET | Refills: 1 | Status: SHIPPED | OUTPATIENT
Start: 2023-12-26 | End: 2024-06-18

## 2023-12-27 DIAGNOSIS — I10 HYPERTENSION GOAL BP (BLOOD PRESSURE) < 140/90: ICD-10-CM

## 2023-12-27 RX ORDER — TRIAMTERENE AND HYDROCHLOROTHIAZIDE 37.5; 25 MG/1; MG/1
1 CAPSULE ORAL EVERY MORNING
Qty: 90 CAPSULE | Refills: 1 | Status: SHIPPED | OUTPATIENT
Start: 2023-12-27 | End: 2024-06-18

## 2024-01-12 ENCOUNTER — LAB (OUTPATIENT)
Dept: LAB | Facility: CLINIC | Age: 74
End: 2024-01-12
Payer: MEDICARE

## 2024-01-12 DIAGNOSIS — I10 HYPERTENSION GOAL BP (BLOOD PRESSURE) < 140/90: ICD-10-CM

## 2024-01-12 LAB
ANION GAP SERPL CALCULATED.3IONS-SCNC: 11 MMOL/L (ref 7–15)
BUN SERPL-MCNC: 16.3 MG/DL (ref 8–23)
CALCIUM SERPL-MCNC: 9.6 MG/DL (ref 8.8–10.2)
CHLORIDE SERPL-SCNC: 104 MMOL/L (ref 98–107)
CREAT SERPL-MCNC: 0.77 MG/DL (ref 0.51–0.95)
DEPRECATED HCO3 PLAS-SCNC: 26 MMOL/L (ref 22–29)
EGFRCR SERPLBLD CKD-EPI 2021: 82 ML/MIN/1.73M2
GLUCOSE SERPL-MCNC: 99 MG/DL (ref 70–99)
POTASSIUM SERPL-SCNC: 3.9 MMOL/L (ref 3.4–5.3)
SODIUM SERPL-SCNC: 141 MMOL/L (ref 135–145)

## 2024-01-12 PROCEDURE — 80048 BASIC METABOLIC PNL TOTAL CA: CPT

## 2024-01-12 PROCEDURE — 36415 COLL VENOUS BLD VENIPUNCTURE: CPT

## 2024-02-20 ENCOUNTER — PATIENT OUTREACH (OUTPATIENT)
Dept: CARE COORDINATION | Facility: CLINIC | Age: 74
End: 2024-02-20
Payer: MEDICARE

## 2024-03-19 ENCOUNTER — PATIENT OUTREACH (OUTPATIENT)
Dept: CARE COORDINATION | Facility: CLINIC | Age: 74
End: 2024-03-19
Payer: MEDICARE

## 2024-03-20 ENCOUNTER — ANCILLARY ORDERS (OUTPATIENT)
Dept: FAMILY MEDICINE | Facility: CLINIC | Age: 74
End: 2024-03-20

## 2024-03-20 DIAGNOSIS — Z12.31 VISIT FOR SCREENING MAMMOGRAM: Primary | ICD-10-CM

## 2024-05-30 ENCOUNTER — ANCILLARY PROCEDURE (OUTPATIENT)
Dept: MAMMOGRAPHY | Facility: CLINIC | Age: 74
End: 2024-05-30
Attending: FAMILY MEDICINE
Payer: MEDICARE

## 2024-05-30 DIAGNOSIS — Z12.31 VISIT FOR SCREENING MAMMOGRAM: ICD-10-CM

## 2024-05-30 PROCEDURE — 77063 BREAST TOMOSYNTHESIS BI: CPT | Mod: TC | Performed by: RADIOLOGY

## 2024-05-30 PROCEDURE — 77067 SCR MAMMO BI INCL CAD: CPT | Mod: TC | Performed by: RADIOLOGY

## 2024-06-18 DIAGNOSIS — Z13.6 CARDIOVASCULAR SCREENING; LDL GOAL LESS THAN 160: Primary | ICD-10-CM

## 2024-06-18 DIAGNOSIS — I10 HYPERTENSION GOAL BP (BLOOD PRESSURE) < 140/90: ICD-10-CM

## 2024-06-18 RX ORDER — LISINOPRIL 10 MG/1
10 TABLET ORAL DAILY
Qty: 90 TABLET | Refills: 1 | Status: SHIPPED | OUTPATIENT
Start: 2024-06-18

## 2024-06-18 RX ORDER — TRIAMTERENE AND HYDROCHLOROTHIAZIDE 37.5; 25 MG/1; MG/1
1 CAPSULE ORAL EVERY MORNING
Qty: 90 CAPSULE | Refills: 1 | Status: SHIPPED | OUTPATIENT
Start: 2024-06-18

## 2024-07-01 ENCOUNTER — NURSE TRIAGE (OUTPATIENT)
Dept: FAMILY MEDICINE | Facility: CLINIC | Age: 74
End: 2024-07-01

## 2024-07-01 ENCOUNTER — OFFICE VISIT (OUTPATIENT)
Dept: FAMILY MEDICINE | Facility: CLINIC | Age: 74
End: 2024-07-01
Payer: MEDICARE

## 2024-07-01 VITALS
DIASTOLIC BLOOD PRESSURE: 72 MMHG | HEIGHT: 63 IN | OXYGEN SATURATION: 96 % | HEART RATE: 86 BPM | SYSTOLIC BLOOD PRESSURE: 115 MMHG | RESPIRATION RATE: 20 BRPM | BODY MASS INDEX: 33.2 KG/M2 | WEIGHT: 187.4 LBS | TEMPERATURE: 97.7 F

## 2024-07-01 DIAGNOSIS — R53.83 OTHER FATIGUE: ICD-10-CM

## 2024-07-01 DIAGNOSIS — R11.2 NAUSEA AND VOMITING, UNSPECIFIED VOMITING TYPE: Primary | ICD-10-CM

## 2024-07-01 DIAGNOSIS — R06.02 SOB (SHORTNESS OF BREATH): ICD-10-CM

## 2024-07-01 DIAGNOSIS — Z13.6 CARDIOVASCULAR SCREENING; LDL GOAL LESS THAN 160: ICD-10-CM

## 2024-07-01 DIAGNOSIS — I10 HYPERTENSION GOAL BP (BLOOD PRESSURE) < 140/90: ICD-10-CM

## 2024-07-01 DIAGNOSIS — R06.09 DYSPNEA ON EXERTION: ICD-10-CM

## 2024-07-01 LAB
ALBUMIN UR-MCNC: NEGATIVE MG/DL
ANION GAP SERPL CALCULATED.3IONS-SCNC: 12 MMOL/L (ref 7–15)
APPEARANCE UR: CLEAR
BILIRUB UR QL STRIP: NEGATIVE
BUN SERPL-MCNC: 26.9 MG/DL (ref 8–23)
CALCIUM SERPL-MCNC: 10.4 MG/DL (ref 8.8–10.2)
CHLORIDE SERPL-SCNC: 101 MMOL/L (ref 98–107)
CHOLEST SERPL-MCNC: 192 MG/DL
COLOR UR AUTO: YELLOW
CREAT SERPL-MCNC: 1.52 MG/DL (ref 0.51–0.95)
DEPRECATED HCO3 PLAS-SCNC: 28 MMOL/L (ref 22–29)
EGFRCR SERPLBLD CKD-EPI 2021: 36 ML/MIN/1.73M2
FASTING STATUS PATIENT QL REPORTED: NO
FASTING STATUS PATIENT QL REPORTED: NO
GLUCOSE SERPL-MCNC: 105 MG/DL (ref 70–99)
GLUCOSE UR STRIP-MCNC: NEGATIVE MG/DL
HDLC SERPL-MCNC: 59 MG/DL
HGB UR QL STRIP: ABNORMAL
HYALINE CASTS #/AREA URNS LPF: ABNORMAL /LPF
KETONES UR STRIP-MCNC: NEGATIVE MG/DL
LDLC SERPL CALC-MCNC: 105 MG/DL
LEUKOCYTE ESTERASE UR QL STRIP: NEGATIVE
NITRATE UR QL: NEGATIVE
NONHDLC SERPL-MCNC: 133 MG/DL
PH UR STRIP: 5.5 [PH] (ref 5–7)
POTASSIUM SERPL-SCNC: 3.6 MMOL/L (ref 3.4–5.3)
RBC #/AREA URNS AUTO: ABNORMAL /HPF
SODIUM SERPL-SCNC: 141 MMOL/L (ref 135–145)
SP GR UR STRIP: 1.01 (ref 1–1.03)
SQUAMOUS #/AREA URNS AUTO: ABNORMAL /LPF
TRIGL SERPL-MCNC: 142 MG/DL
TSH SERPL DL<=0.005 MIU/L-ACNC: 2.19 UIU/ML (ref 0.3–4.2)
UROBILINOGEN UR STRIP-ACNC: 0.2 E.U./DL
WBC #/AREA URNS AUTO: ABNORMAL /HPF

## 2024-07-01 PROCEDURE — 80048 BASIC METABOLIC PNL TOTAL CA: CPT | Performed by: FAMILY MEDICINE

## 2024-07-01 PROCEDURE — 86618 LYME DISEASE ANTIBODY: CPT | Performed by: FAMILY MEDICINE

## 2024-07-01 PROCEDURE — 80061 LIPID PANEL: CPT | Performed by: FAMILY MEDICINE

## 2024-07-01 PROCEDURE — G2211 COMPLEX E/M VISIT ADD ON: HCPCS | Performed by: FAMILY MEDICINE

## 2024-07-01 PROCEDURE — 36415 COLL VENOUS BLD VENIPUNCTURE: CPT | Performed by: FAMILY MEDICINE

## 2024-07-01 PROCEDURE — 81001 URINALYSIS AUTO W/SCOPE: CPT | Performed by: FAMILY MEDICINE

## 2024-07-01 PROCEDURE — 99000 SPECIMEN HANDLING OFFICE-LAB: CPT | Performed by: FAMILY MEDICINE

## 2024-07-01 PROCEDURE — 86789 WEST NILE VIRUS ANTIBODY: CPT | Mod: 90 | Performed by: FAMILY MEDICINE

## 2024-07-01 PROCEDURE — 84443 ASSAY THYROID STIM HORMONE: CPT | Performed by: FAMILY MEDICINE

## 2024-07-01 PROCEDURE — 93000 ELECTROCARDIOGRAM COMPLETE: CPT | Performed by: FAMILY MEDICINE

## 2024-07-01 PROCEDURE — 86788 WEST NILE VIRUS AB IGM: CPT | Mod: 90 | Performed by: FAMILY MEDICINE

## 2024-07-01 PROCEDURE — 99214 OFFICE O/P EST MOD 30 MIN: CPT | Performed by: FAMILY MEDICINE

## 2024-07-01 RX ORDER — RESPIRATORY SYNCYTIAL VIRUS VACCINE 120MCG/0.5
0.5 KIT INTRAMUSCULAR ONCE
Qty: 1 EACH | Refills: 0 | Status: CANCELLED | OUTPATIENT
Start: 2024-07-01 | End: 2024-07-01

## 2024-07-01 RX ORDER — ONDANSETRON 4 MG/1
4-8 TABLET, ORALLY DISINTEGRATING ORAL EVERY 6 HOURS PRN
Qty: 30 TABLET | Refills: 0 | Status: SHIPPED | OUTPATIENT
Start: 2024-07-01 | End: 2024-07-16

## 2024-07-01 ASSESSMENT — PAIN SCALES - GENERAL: PAINLEVEL: NO PAIN (0)

## 2024-07-01 ASSESSMENT — ENCOUNTER SYMPTOMS
NAUSEA: 1
FATIGUE: 1

## 2024-07-01 NOTE — TELEPHONE ENCOUNTER
Reason for Disposition   MODERATE weakness (i.e., interferes with work, school, normal activities) and persists > 3 days    Additional Information   Negative: SEVERE difficulty breathing (e.g., struggling for each breath, speaks in single words)   Negative: Shock suspected (e.g., cold/pale/clammy skin, too weak to stand, low BP, rapid pulse)   Negative: Difficult to awaken or acting confused (e.g., disoriented, slurred speech)   Negative: Fainted > 15 minutes ago and still feels too weak or dizzy to stand   Negative: SEVERE weakness (i.e., unable to walk or barely able to walk, requires support) and new-onset or worsening   Negative: Sounds like a life-threatening emergency to the triager   Negative: Weakness of the face, arm or leg on one side of the body   Negative: Has diabetes mellitus and weakness from low blood sugar (i.e., < 60 mg/dL or 3.5 mmol/L)   Negative: Recent heat exposure, suspected cause of weakness   Negative: Vomiting is main symptom   Negative: Diarrhea is main symptom   Negative: Difficulty breathing   Negative: Heart beating < 50 beats per minute OR > 140 beats per minute   Negative: Extra heartbeats, irregular heart beating, or heart is beating very fast (i.e., 'palpitations')   Negative: Follows large amount of bleeding (e.g., from vomiting, rectum, vagina) (Exception: Small transient weakness from sight of a small amount blood.)   Negative: Black or tarry bowel movements   Negative: MODERATE weakness or fatigue from poor fluid intake with no improvement after 2 hours of rest and fluids   Negative: Drinking very little and dehydration suspected (e.g., no urine > 12 hours, very dry mouth, very lightheaded)   Negative: Patient sounds very sick or weak to the triager   Negative: MODERATE weakness (i.e., interferes with work, school, normal activities) and cause unknown (Exceptions: Weakness from acute minor illness or from poor fluid intake; weakness is chronic and not worse.)   Negative: Fever  "> 103 F  (39.4 C) and not able to get the Fever down using CARE ADVICE   Negative: Fever > 100.0 F (37.8 C) and bedridden (e.g., CVA, chronic illness, recovering from surgery)   Negative: Fever > 101 F (38.3 C) and over 60 years of age   Negative: Fever > 100.0 F (37.8 C) and diabetes mellitus or weak immune system (e.g., HIV positive, cancer chemo, splenectomy, organ transplant, chronic steroids)   Negative: Pale skin (pallor)    Answer Assessment - Initial Assessment Questions  1. DESCRIPTION: \"Describe how you are feeling.\"      No energy, tired, winded when walk up steps, nausea  2. SEVERITY: \"How bad is it?\"  \"Can you stand and walk?\"    - MILD (0-3): Feels weak or tired, but does not interfere with work, school or normal activities.    - MODERATE (4-7): Able to stand and walk; weakness interferes with work, school, or normal activities.    - SEVERE (8-10): Unable to stand or walk; unable to do usual activities.      mild  3. ONSET: \"When did these symptoms begin?\" (e.g., hours, days, weeks, months)      10 days  4. CAUSE: \"What do you think is causing the weakness or fatigue?\" (e.g., not drinking enough fluids, medical problem, trouble sleeping)      No known, not eating much, lost 8 lbs last week.   5. NEW MEDICINES:  \"Have you started on any new medicines recently?\" (e.g., opioid pain medicines, benzodiazepines, muscle relaxants, antidepressants, antihistamines, neuroleptics, beta blockers)      no  6. OTHER SYMPTOMS: \"Do you have any other symptoms?\" (e.g., chest pain, fever, cough, SOB, vomiting, diarrhea, bleeding, other areas of pain)      See above, vomited one time only last week, diarrhea one day last week is drinking fluids and urinating regularly   7. PREGNANCY: \"Is there any chance you are pregnant?\" \"When was your last menstrual period?\"      Not applicable    Protocols used: Weakness (Generalized) and Fatigue-A-OH    "

## 2024-07-01 NOTE — PROGRESS NOTES
"  Assessment & Plan     (R11.2) Nausea and vomiting, unspecified vomiting type  (primary encounter diagnosis)  Comment: uncertain etiology  Plan: Lyme Disease Total Abs Bld with Reflex to         Confirm CLIA, West Nile Virus Antibody IgG IgM,        TSH with free T4 reflex, ondansetron (ZOFRAN         ODT) 4 MG ODT tab, UA Macroscopic with reflex         to Microscopic and Culture - Lab Collect, UA         Microscopic with Reflex to Culture        Test for lyme, west nile, tsh, consider overwhelmed by insect bites.  Zofran for nausea    (R53.83) Other fatigue  (R06.09) Dyspnea on exertion  (R06.02) SOB (shortness of breath)  Comment: cannot r/o cardiac source  Plan: TSH with free T4 reflex, EKG 12-lead complete         w/read - Clinics        EKG today normal, though pt without symptoms at this time.  If all labs normal, plan stress test    (I10) Hypertension goal BP (blood pressure) < 140/90  Comment: to goal  Plan: continue current meds    (Z13.6) CARDIOVASCULAR SCREENING; LDL GOAL LESS THAN 160  Comment: due  Plan: lipid today    The longitudinal plan of care for the diagnosis(es)/condition(s) as documented were addressed during this visit. Due to the added complexity in care, I will continue to support Marisol in the subsequent management and with ongoing continuity of care.     BMI  Estimated body mass index is 33.73 kg/m  as calculated from the following:    Height as of this encounter: 1.588 m (5' 2.5\").    Weight as of this encounter: 85 kg (187 lb 6.4 oz).   Weight management plan: Discussed healthy diet and exercise guidelines      See Patient Instructions    Subjective   Marisol is a 74 year old, presenting for the following health issues:  Fatigue and Nausea      7/1/2024    11:35 AM   Additional Questions   Roomed by Felix     Fatigue  Associated symptoms include fatigue and nausea.   Nausea  Associated symptoms include fatigue and nausea.   History of Present Illness       Reason for visit:  " "Nauseous/winded  Symptom onset:  1-2 weeks ago  Symptoms include:  Nauseous/winded  Symptom intensity:  Moderate  Symptom progression:  Staying the same  Had these symptoms before:  No  What makes it worse:  Activities  What makes it better:  Laying down    She eats 2-3 servings of fruits and vegetables daily.She consumes 0 sweetened beverage(s) daily.She exercises with enough effort to increase her heart rate 30 to 60 minutes per day.  She exercises with enough effort to increase her heart rate 5 days per week.   She is taking medications regularly.     [facility] went to [facility] down, then vomiting x 1.  All symptoms resolved in 15 min.  Then felt fine  Recurred 2 days later and since then has been nauseated.  Worse days were 4-5 days ago with diarrhea/nausea/vomiting for 2 days.      Now getting fatigue and nausea on/off, worse with activity.    Poor appetite during all of this. Now improved appetite though still feels ill    Fatigue is significant, could sleep most of the day. Still sleeps well.  Nausea improves with lying down.    Some shortness of breath with stairs, was ok walking in from parking lot today.  Has stopped usual exercise due to fatigue and nausea.      No chest pain or palpitations, no LE edema , no rash or joint pain. Did start different  version of dyazide.  No cough, congestion or sore throat.     No tick bites.   Did get \"no seeums\" bites - large amount and had 1 day of itching.              Review of Systems  Constitutional, HEENT, cardiovascular, pulmonary, gi and gu systems are negative, except as otherwise noted.      Objective    /72   Pulse 86   Temp 97.7  F (36.5  C) (Oral)   Resp 20   Ht 1.588 m (5' 2.5\")   Wt 85 kg (187 lb 6.4 oz)   SpO2 96%   BMI 33.73 kg/m    Body mass index is 33.73 kg/m .  Physical Exam   GENERAL: alert and no distress  EYES: Eyes grossly normal to inspection, PERRL and conjunctivae and sclerae " normal  RESP: lungs clear to auscultation - no rales, rhonchi or wheezes  CV: regular rate and rhythm, normal S1 S2, no S3 or S4, no murmur, click or rub, no peripheral edema   MS: no gross musculoskeletal defects noted, no edema  SKIN: no suspicious lesions or rashes  PSYCH: mentation appears normal, affect normal/bright, and fatigued    EKG: NSR, no ischemic changes. C/w previous EKG 2016        Signed Electronically by: Kizzy Thomason MD

## 2024-07-02 LAB — B BURGDOR IGG+IGM SER QL: 0.04

## 2024-07-03 LAB
WNV IGG SER IA-ACNC: 0.75 IV
WNV IGM SER IA-ACNC: 0 IV

## 2024-07-11 SDOH — HEALTH STABILITY: PHYSICAL HEALTH: ON AVERAGE, HOW MANY MINUTES DO YOU ENGAGE IN EXERCISE AT THIS LEVEL?: 60 MIN

## 2024-07-11 SDOH — HEALTH STABILITY: PHYSICAL HEALTH: ON AVERAGE, HOW MANY DAYS PER WEEK DO YOU ENGAGE IN MODERATE TO STRENUOUS EXERCISE (LIKE A BRISK WALK)?: 5 DAYS

## 2024-07-11 ASSESSMENT — SOCIAL DETERMINANTS OF HEALTH (SDOH): HOW OFTEN DO YOU GET TOGETHER WITH FRIENDS OR RELATIVES?: MORE THAN THREE TIMES A WEEK

## 2024-07-16 ENCOUNTER — OFFICE VISIT (OUTPATIENT)
Dept: FAMILY MEDICINE | Facility: CLINIC | Age: 74
End: 2024-07-16
Payer: MEDICARE

## 2024-07-16 VITALS
OXYGEN SATURATION: 95 % | HEART RATE: 79 BPM | WEIGHT: 188.6 LBS | TEMPERATURE: 98.1 F | RESPIRATION RATE: 16 BRPM | BODY MASS INDEX: 33.42 KG/M2 | SYSTOLIC BLOOD PRESSURE: 111 MMHG | HEIGHT: 63 IN | DIASTOLIC BLOOD PRESSURE: 70 MMHG

## 2024-07-16 DIAGNOSIS — Z12.11 SPECIAL SCREENING FOR MALIGNANT NEOPLASMS, COLON: ICD-10-CM

## 2024-07-16 DIAGNOSIS — I10 HYPERTENSION GOAL BP (BLOOD PRESSURE) < 140/90: ICD-10-CM

## 2024-07-16 DIAGNOSIS — Z00.00 ENCOUNTER FOR MEDICARE ANNUAL WELLNESS EXAM: Primary | ICD-10-CM

## 2024-07-16 LAB
ANION GAP SERPL CALCULATED.3IONS-SCNC: 11 MMOL/L (ref 7–15)
BUN SERPL-MCNC: 17.6 MG/DL (ref 8–23)
CALCIUM SERPL-MCNC: 9.5 MG/DL (ref 8.8–10.4)
CHLORIDE SERPL-SCNC: 104 MMOL/L (ref 98–107)
CREAT SERPL-MCNC: 0.86 MG/DL (ref 0.51–0.95)
EGFRCR SERPLBLD CKD-EPI 2021: 71 ML/MIN/1.73M2
GLUCOSE SERPL-MCNC: 99 MG/DL (ref 70–99)
HCO3 SERPL-SCNC: 25 MMOL/L (ref 22–29)
POTASSIUM SERPL-SCNC: 3.3 MMOL/L (ref 3.4–5.3)
SODIUM SERPL-SCNC: 140 MMOL/L (ref 135–145)

## 2024-07-16 PROCEDURE — 36415 COLL VENOUS BLD VENIPUNCTURE: CPT | Performed by: FAMILY MEDICINE

## 2024-07-16 PROCEDURE — 80048 BASIC METABOLIC PNL TOTAL CA: CPT | Performed by: FAMILY MEDICINE

## 2024-07-16 PROCEDURE — G0439 PPPS, SUBSEQ VISIT: HCPCS | Performed by: FAMILY MEDICINE

## 2024-07-16 RX ORDER — RESPIRATORY SYNCYTIAL VIRUS VACCINE 120MCG/0.5
0.5 KIT INTRAMUSCULAR ONCE
Qty: 1 EACH | Refills: 0 | Status: CANCELLED | OUTPATIENT
Start: 2024-07-16 | End: 2024-07-16

## 2024-07-16 ASSESSMENT — PAIN SCALES - GENERAL: PAINLEVEL: NO PAIN (0)

## 2024-07-16 NOTE — PATIENT INSTRUCTIONS
Patient Education   Preventive Care Advice   This is general advice given by our system to help you stay healthy. However, your care team may have specific advice just for you. Please talk to your care team about your preventive care needs.  Nutrition  Eat 5 or more servings of fruits and vegetables each day.  Try wheat bread, brown rice and whole grain pasta (instead of white bread, rice, and pasta).  Get enough calcium and vitamin D. Check the label on foods and aim for 100% of the RDA (recommended daily allowance).  Lifestyle  Exercise at least 150 minutes each week  (30 minutes a day, 5 days a week).  Do muscle strengthening activities 2 days a week. These help control your weight and prevent disease.  No smoking.  Wear sunscreen to prevent skin cancer.  Have a dental exam and cleaning every 6 months.  Yearly exams  See your health care team every year to talk about:  Any changes in your health.  Any medicines your care team has prescribed.  Preventive care, family planning, and ways to prevent chronic diseases.  Shots (vaccines)   HPV shots (up to age 26), if you've never had them before.  Hepatitis B shots (up to age 59), if you've never had them before.  COVID-19 shot: Get this shot when it's due.  Flu shot: Get a flu shot every year.  Tetanus shot: Get a tetanus shot every 10 years.  Pneumococcal, hepatitis A, and RSV shots: Ask your care team if you need these based on your risk.  Shingles shot (for age 50 and up)  General health tests  Diabetes screening:  Starting at age 35, Get screened for diabetes at least every 3 years.  If you are younger than age 35, ask your care team if you should be screened for diabetes.  Cholesterol test: At age 39, start having a cholesterol test every 5 years, or more often if advised.  Bone density scan (DEXA): At age 50, ask your care team if you should have this scan for osteoporosis (brittle bones).  Hepatitis C: Get tested at least once in your life.  STIs (sexually  transmitted infections)  Before age 24: Ask your care team if you should be screened for STIs.  After age 24: Get screened for STIs if you're at risk. You are at risk for STIs (including HIV) if:  You are sexually active with more than one person.  You don't use condoms every time.  You or a partner was diagnosed with a sexually transmitted infection.  If you are at risk for HIV, ask about PrEP medicine to prevent HIV.  Get tested for HIV at least once in your life, whether you are at risk for HIV or not.  Cancer screening tests  Cervical cancer screening: If you have a cervix, begin getting regular cervical cancer screening tests starting at age 21.  Breast cancer scan (mammogram): If you've ever had breasts, begin having regular mammograms starting at age 40. This is a scan to check for breast cancer.  Colon cancer screening: It is important to start screening for colon cancer at age 45.  Have a colonoscopy test every 10 years (or more often if you're at risk) Or, ask your provider about stool tests like a FIT test every year or Cologuard test every 3 years.  To learn more about your testing options, visit:   .  For help making a decision, visit:   https://bit.ly/wz79687.  Prostate cancer screening test: If you have a prostate, ask your care team if a prostate cancer screening test (PSA) at age 55 is right for you.  Lung cancer screening: If you are a current or former smoker ages 50 to 80, ask your care team if ongoing lung cancer screenings are right for you.  For informational purposes only. Not to replace the advice of your health care provider. Copyright   2023 [employer]. All rights reserved. Clinically reviewed by the [facility]itions Program. [employer] 140489 - REV 01/24.

## 2024-07-16 NOTE — PROGRESS NOTES
Preventive Care Visit  [facility]  Kizzy Thomason MD, Family Medicine  Jul 16, 2024      Assessment & Plan     (Z00.00) Encounter for Medicare annual wellness exam  (primary encounter diagnosis)  Comment: preventive needs reviewed   Plan: see orders in Epic.     (I10) Hypertension goal BP (blood pressure) < 140/90  Comment: to goal on meds  Plan: Basic metabolic panel  (Ca, Cl, CO2, Creat,         Gluc, K, Na, BUN)        Recent refills x 6 months, labs in 6 motnsh    (Z12.11) Special screening for malignant neoplasms, colon  Comment: due for screening due to fmhx   Plan: Colonoscopy Screening  Referral                    Counseling  Appropriate preventive services were discussed with this patient, including applicable screening as appropriate for fall prevention, nutrition, physical activity, Tobacco-use cessation, weight loss and cognition.  Checklist reviewing preventive services available has been given to the patient.  Reviewed patient's diet, addressing concerns and/or questions.   She is at risk for psychosocial distress and has been provided with information to reduce risk.       See Patient Instructions    Paul Damon is a 74 year old, presenting for the following:  Wellness Visit        7/16/2024     7:10 AM   Additional Questions   Roomed by Felix         Health Care Directive  Patient does not have a Health Care Directive or Living Will: Patient states has Advance Directive and will bring in a copy to clinic.    HPI  Much improved since visit for acute illness.  Nausea and fatigue resolved.                7/11/2024   General Health   How would you rate your overall physical health? Good   Feel stress (tense, anxious, or unable to sleep) Only a little      (!) STRESS CONCERN      7/11/2024   Nutrition   Diet: Regular (no restrictions)            7/11/2024   Exercise   Days per week of moderate/strenous exercise 5 days   Average minutes spent exercising at this  level 60 min            7/11/2024   Social Factors   Frequency of gathering with friends or relatives More than three times a week   Worry food won't last until get money to buy more No   Food not last or not have enough money for food? No   Do you have housing? (Housing is defined as stable permanent housing and does not include staying ouside in a car, in a tent, in an abandoned building, in an overnight shelter, or couch-surfing.) Yes   Are you worried about losing your housing? No   Lack of transportation? No   Unable to get utilities (heat,electricity)? No            7/11/2024   Fall Risk   Fallen 2 or more times in the past year? No    No   Trouble with walking or balance? No    No       Multiple values from one day are sorted in reverse-chronological order          7/11/2024   Activities of Daily Living- Home Safety   Needs help with the following daily activites None of the above   Safety concerns in the home None of the above            7/11/2024   Dental   Dentist two times every year? Yes            7/11/2024   Hearing Screening   Hearing concerns? None of the above            7/11/2024   Driving Risk Screening   Patient/family members have concerns about driving No            7/11/2024   General Alertness/Fatigue Screening   Have you been more tired than usual lately? No            7/11/2024   Urinary Incontinence Screening   Bothered by leaking urine in past 6 months No            7/11/2024   TB Screening   Were you born outside of the [place]? No            Today's PHQ-2 Score:       7/15/2024     9:22 AM   PHQ-2 ( 1999 [employer])   Q1: Little interest or pleasure in doing things 0   Q2: Feeling down, depressed or hopeless 0   PHQ-2 Score 0   Q1: Little interest or pleasure in doing things Not at all   Q2: Feeling down, depressed or hopeless Not at all   PHQ-2 Score 0           7/11/2024   Substance Use   Alcohol more than 3/day or more than 7/wk No   Do you have a current opioid prescription? No   How  severe/bad is pain from 1 to 10? 1/10   Do you use any other substances recreationally? No        Social History     Tobacco Use    Smoking status: Never    Smokeless tobacco: Never    Tobacco comments:     smoke free household   Vaping Use    Vaping status: Never Used   Substance Use Topics    Alcohol use: No    Drug use: No           2024   LAST FHS-7 RESULTS   1st degree relative breast or ovarian cancer No   Any relative bilateral breast cancer No   Any male have breast cancer No   Any ONE woman have BOTH breast AND ovarian cancer No   Any woman with breast cancer before 50yrs No   2 or more relatives with breast AND/OR ovarian cancer No   2 or more relatives with breast AND/OR bowel cancer No           Mammogram Screening - Mammogram every 1-2 years updated in Health Maintenance based on mutual decision making    G 2 P 2   No LMP recorded. Patient is postmenopausal.     Fasting: Yes    Td: tdap 2020       Flu: 2023      Covid: 2022      Shingrix: done       PPV: done       RSV: discussed             Cholesterol:   Lab Results   Component Value Date    CHOL 192 2024    CHOL 189 2019     Lab Results   Component Value Date    HDL 59 2024    HDL 73 2019     Lab Results   Component Value Date     2024    LDL 90 2019     Lab Results   Component Value Date    TRIG 142 2024    TRIG 130 2019     Lab Results   Component Value Date    CHOLHDLRATIO 2.6 2014         MM2024  Dexa:  2017     Flex/colo: 2019 q5y scope      Seat Belt: Yes    Sunscreen use: Yes   Calcium Intake: adeq  Health Care Directive: Yes   Sexually Active: Yes     Current contraception: none  History of abnormal Pap smear: No  Family history of colon/breast/ovarian cancer: Yes: see [facility]  Regular self breast exam: Yes  History of abnormal mammogram: No      ASCVD Risk   The 10-year ASCVD risk score (Chanelle MEJIA, et al., 2019) is: 11.7%    Values used to calculate the  score:      Age: 74 years      Sex: Female      Is Non- : No      Diabetic: No      Tobacco smoker: No      Systolic Blood Pressure: 111 mmHg      Is BP treated: Yes      HDL Cholesterol: 59 mg/dL      Total Cholesterol: 192 mg/dL            Reviewed and updated as needed this visit by Provider   Tobacco  Allergies  Meds  Problems  Med Hx  Surg Hx  Fam Hx            Labs reviewed in EPIC  BP Readings from Last 3 Encounters:   07/16/24 111/70   07/01/24 115/72   12/08/23 126/75    Wt Readings from Last 3 Encounters:   07/16/24 85.5 kg (188 lb 9.6 oz)   07/01/24 85 kg (187 lb 6.4 oz)   12/08/23 83.5 kg (184 lb)                  Patient Active Problem List   Diagnosis    Back pain    Osteoarthritis    Hypertension goal BP (blood pressure) < 140/90    Vitreous membranes and strands    CARDIOVASCULAR SCREENING; LDL GOAL LESS THAN 160    Combined form of age-related cataract, right eye    Pseudophakia of both eyes    Lumbar radiculopathy    PXF (pseudoexfoliation of lens capsule)     Past Surgical History:   Procedure Laterality Date    APPENDECTOMY      CATARACT IOL, RT/LT  2017    Dr Jiménez    COLONOSCOPY  2012?    COLONOSCOPY WITH CO2 INSUFFLATION N/A 9/12/2019    Procedure: COLONOSCOPY, WITH CO2 INSUFFLATION;  Surgeon: Jose Preston MD;  Location: [facility] OR    FOOT SURGERY      left foot 1st  fusion    ORTHOPEDIC SURGERY      PHACOEMULSIFICATION CLEAR CORNEA WITH STANDARD INTRAOCULAR LENS IMPLANT Left 6/6/2017    Procedure: PHACOEMULSIFICATION CLEAR CORNEA WITH STANDARD INTRAOCULAR LENS IMPLANT;  LEFT EYE PHACOEMULSIFICATION CLEAR CORNEA WITH STANDARD INTRAOCULAR LENS IMPLANT ;  Surgeon: Caprice Jiménez MD;  Location[facility]    PHACOEMULSIFICATION CLEAR CORNEA WITH STANDARD INTRAOCULAR LENS IMPLANT Right 6/20/2017    Procedure: PHACOEMULSIFICATION CLEAR CORNEA WITH STANDARD INTRAOCULAR LENS IMPLANT;  RIGHT EYE PHACOEMULSIFICATION CLEAR CORNEA WITH STANDARD INTRAOCULAR LENS  IMPLANT;  Surgeon: Caprice Jiménez MD;  Location: [facility]       Social History     Tobacco Use    Smoking status: Never    Smokeless tobacco: Never    Tobacco comments:     smoke free household   Substance Use Topics    Alcohol use: No     Family History   Problem Relation Age of Onset    Lipids Mother     Cancer - colorectal Mother 70    Hypertension Mother     Arthritis Mother     Cancer Mother 84        lung cancer     Thyroid Disease Mother     Colon Cancer Mother     Other Cancer Mother     Arthritis Father     Hypertension Father     Macular Degeneration Father 85        monitors visison with [employer] grid, more trouble reading    Depression Father     Anxiety Disorder Father     Diabetes Maternal Grandmother     Diabetes Paternal Grandmother     Asthma Brother     Depression Brother     Anxiety Disorder Brother     Thyroid Disease Brother     Hypertension Sister     Thyroid Disease Sister     Eye Disorder Son 25        keratoconus?    Glaucoma Daughter 18        montoring pressure    Glaucoma Paternal Aunt     Diabetes Paternal Aunt     Hypertension Sister     Thyroid Disease Sister     Depression Brother     Anxiety Disorder Brother     Asthma Brother     Thyroid Disease Brother     Cerebrovascular Disease No family hx of          Current Outpatient Medications   Medication Sig Dispense Refill    CALCIUM 600+D PO 2 tablet daily      lisinopril (ZESTRIL) 10 MG tablet TAKE 1 TABLET(10 MG) BY MOUTH DAILY 90 tablet 1    Multiple Vitamins-Minerals (MULTIVITAMIN GUMMIES ADULT PO)       triamterene-HCTZ (DYAZIDE) 37.5-25 MG capsule TAKE 1 CAPSULE BY MOUTH EVERY MORNING 90 capsule 1     Current providers sharing in care for this patient include:  Patient Care Team:  Kizzy Thomason MD as PCP - General (Family Practice)  Kizzy Thomason MD as Assigned PCP  Dilma Soliz MD as MD (Dermatology)  Sindhu Leach OD as Assigned Surgical Provider  Sindhu Leach OD (Optometry)    The following health  "maintenance items are reviewed in Epic and correct as of today:  Health Maintenance   Topic Date Due    ANNUAL REVIEW OF HM ORDERS  Never done    RSV VACCINE (Pregnancy & 60+) (1 - 1-dose 60+ series) Never done    ADVANCE CARE PLANNING  07/27/2021    COVID-19 Vaccine (5 - 2023-24 season) 09/01/2023    EYE EXAM  05/30/2024    INFLUENZA VACCINE (1) 09/01/2024    COLORECTAL CANCER SCREENING  09/12/2024    BMP  01/01/2025    MEDICARE ANNUAL WELLNESS VISIT  07/16/2025    FALL RISK ASSESSMENT  07/16/2025    MAMMO SCREENING  05/30/2026    GLUCOSE  07/01/2027    DEXA  07/31/2027    LIPID  07/01/2029    DTAP/TDAP/TD IMMUNIZATION (3 - Td or Tdap) 09/29/2030    HEPATITIS C SCREENING  Completed    PHQ-2 (once per calendar year)  Completed    Pneumococcal Vaccine: 65+ Years  Completed    ZOSTER IMMUNIZATION  Completed    IPV IMMUNIZATION  Aged Out    HPV IMMUNIZATION  Aged Out    MENINGITIS IMMUNIZATION  Aged Out    RSV MONOCLONAL ANTIBODY  Aged Out         Review of Systems  Constitutional, neuro, ENT, endocrine, pulmonary, cardiac, gastrointestinal, genitourinary, musculoskeletal, integument and psychiatric systems are negative, except as otherwise noted.     Objective    Exam  /70   Pulse 79   Temp 98.1  F (36.7  C) (Oral)   Resp 16   Ht 1.6 m (5' 3\")   Wt 85.5 kg (188 lb 9.6 oz)   SpO2 95%   BMI 33.41 kg/m     Estimated body mass index is 33.41 kg/m  as calculated from the following:    Height as of this encounter: 1.6 m (5' 3\").    Weight as of this encounter: 85.5 kg (188 lb 9.6 oz).    Physical Exam  GENERAL: alert and no distress  EYES: Eyes grossly normal to inspection, PERRL and conjunctivae and sclerae normal  HENT: ear canals and TM's normal, nose and mouth without ulcers or lesions  NECK: no adenopathy, no asymmetry, masses, or scars  RESP: lungs clear to auscultation - no rales, rhonchi or wheezes  BREAST: normal without masses, tenderness or nipple discharge and no palpable axillary masses or " adenopathy  CV: regular rate and rhythm, normal S1 S2, no S3 or S4, no murmur, click or rub, no peripheral edema  ABDOMEN: soft, nontender, no hepatosplenomegaly, no masses and bowel sounds normal  MS: no gross musculoskeletal defects noted, no edema  SKIN: no suspicious lesions or rashes  NEURO: Normal strength and tone, mentation intact and speech normal  PSYCH: mentation appears normal, affect normal/bright        7/16/2024   Mini Cog   Mini-Cog Not Completed (choose reason) Patient declines      Normal cognition based on my direct observation during interview and exam.     Patient declines, there are NO concerns for cognitive deficits.           Signed Electronically by: Kizzy Thomason MD     normal rate, regular rhythm, and no murmur.

## 2024-08-08 ENCOUNTER — OFFICE VISIT (OUTPATIENT)
Dept: OPTOMETRY | Facility: CLINIC | Age: 74
End: 2024-08-08
Payer: MEDICARE

## 2024-08-08 DIAGNOSIS — H26.8 PXF (PSEUDOEXFOLIATION OF LENS CAPSULE): ICD-10-CM

## 2024-08-08 DIAGNOSIS — H52.223 REGULAR ASTIGMATISM OF BOTH EYES: ICD-10-CM

## 2024-08-08 DIAGNOSIS — H52.4 PRESBYOPIA: ICD-10-CM

## 2024-08-08 DIAGNOSIS — Z96.1 PSEUDOPHAKIA OF BOTH EYES: Primary | ICD-10-CM

## 2024-08-08 DIAGNOSIS — H52.03 HYPEROPIA, BILATERAL: ICD-10-CM

## 2024-08-08 PROCEDURE — 92014 COMPRE OPH EXAM EST PT 1/>: CPT | Performed by: OPTOMETRIST

## 2024-08-08 PROCEDURE — 92015 DETERMINE REFRACTIVE STATE: CPT | Mod: GY | Performed by: OPTOMETRIST

## 2024-08-08 ASSESSMENT — REFRACTION_MANIFEST
OS_SPHERE: -0.75
OS_CYLINDER: +1.25
OD_ADD: +2.75
OD_SPHERE: -0.25
OD_SPHERE: -0.25
OD_CYLINDER: +1.00
OS_ADD: +2.75
OS_AXIS: 115
OD_CYLINDER: +1.25
OS_AXIS: 108
OD_AXIS: 035
OD_AXIS: 038
OS_CYLINDER: +1.00
OS_SPHERE: -0.50

## 2024-08-08 ASSESSMENT — KERATOMETRY
OD_AXISANGLE_DEGREES: 47
OD_K2POWER_DIOPTERS: 43.00
OS_AXISANGLE_DEGREES: 105
OD_AXISANGLE2_DEGREES: 137
OD_K1POWER_DIOPTERS: 42.00
OS_AXISANGLE2_DEGREES: 15
OS_K1POWER_DIOPTERS: 42.50
OS_K2POWER_DIOPTERS: 43.50

## 2024-08-08 ASSESSMENT — REFRACTION_WEARINGRX
SPECS_TYPE: PAL
OS_SPHERE: -0.25
OD_ADD: +2.75
OD_CYLINDER: +1.00
OS_ADD: +2.75
OD_SPHERE: -0.50
OD_AXIS: 045
OS_CYLINDER: +1.00
OS_AXIS: 115

## 2024-08-08 ASSESSMENT — SLIT LAMP EXAM - LIDS
COMMENTS: NORMAL
COMMENTS: NORMAL

## 2024-08-08 ASSESSMENT — CUP TO DISC RATIO
OS_RATIO: 0.2
OD_RATIO: 0.2

## 2024-08-08 ASSESSMENT — TONOMETRY
OD_IOP_MMHG: 17
IOP_METHOD: APPLANATION
OS_IOP_MMHG: 17

## 2024-08-08 ASSESSMENT — CONF VISUAL FIELD
OD_SUPERIOR_NASAL_RESTRICTION: 0
OS_SUPERIOR_TEMPORAL_RESTRICTION: 0
OS_SUPERIOR_NASAL_RESTRICTION: 0
OD_INFERIOR_NASAL_RESTRICTION: 0
OS_NORMAL: 1
OS_INFERIOR_NASAL_RESTRICTION: 0
OD_SUPERIOR_TEMPORAL_RESTRICTION: 0
OD_NORMAL: 1
OS_INFERIOR_TEMPORAL_RESTRICTION: 0
OD_INFERIOR_TEMPORAL_RESTRICTION: 0

## 2024-08-08 ASSESSMENT — VISUAL ACUITY
OD_SC+: -1
OD_CC: 20/20
OD_SC: 20/25
OS_SC+: +1
CORRECTION_TYPE: GLASSES
OS_CC: 20/25
OS_SC: 20/30
OS_CC+: -2
OD_CC: 20/20
METHOD: SNELLEN - LINEAR
OS_CC: 20/20
OD_CC+: -1

## 2024-08-08 ASSESSMENT — PACHYMETRY
OD_CT(UM): .605
OS_CT(UM): .601

## 2024-08-08 ASSESSMENT — EXTERNAL EXAM - LEFT EYE: OS_EXAM: NORMAL

## 2024-08-08 ASSESSMENT — EXTERNAL EXAM - RIGHT EYE: OD_EXAM: NORMAL

## 2024-08-08 NOTE — LETTER
8/8/2024      Marisol Orta  [address] [id number]      Dear Colleague,    Thank you for referring your patient, Marisol Orta, to the [facility]. Please see a copy of my visit note below.    Chief Complaint   Patient presents with     Annual Eye Exam         Last Eye Exam: 5/30/2023  Dilated Previously: Yes    What are you currently using to see?  glasses       Distance Vision Acuity: Satisfied with vision    Near Vision Acuity: Satisfied with vision while reading and using computer with glasses    Eye Comfort: good  Do you use eye drops? : No      Denelle Yaneth - Optometric Assistant          Medical, surgical and family histories reviewed and updated 8/8/2024.       Cataract surgery 2017 both eyes  Pseudoexfoliation     ARMD father, daughter OHTN being     OBJECTIVE: See Ophthalmology exam    ASSESSMENT:    ICD-10-CM    1. Pseudophakia of both eyes  Z96.1       2. PXF (pseudoexfoliation of lens capsule)  H26.8       3. Hyperopia, bilateral  H52.03       4. Regular astigmatism of both eyes  H52.223       5. Presbyopia  H52.4           PLAN:     There are no Patient Instructions on file for this visit.       Again, thank you for allowing me to participate in the care of your patient.        Sincerely,        Sindhu Leach, OD

## 2024-08-08 NOTE — PATIENT INSTRUCTIONS
Optional to fill new glasses prescription, minimal change      Return in 1 year for eye exam    Sindhu Leach, OD  [phone]

## 2024-08-08 NOTE — PROGRESS NOTES
Chief Complaint   Patient presents with    Annual Eye Exam         Last Eye Exam: 5/30/2023  Dilated Previously: Yes    What are you currently using to see?  glasses       Distance Vision Acuity: Satisfied with vision    Near Vision Acuity: Satisfied with vision while reading and using computer with glasses    Eye Comfort: good  Do you use eye drops? : No      Denelle Yaneth - Optometric Assistant          Medical, surgical and family histories reviewed and updated 8/8/2024.       Cataract surgery 2017 both eyes  Pseudoexfoliation     ARMD father, daughter OHTN being     OBJECTIVE: See Ophthalmology exam    ASSESSMENT:    ICD-10-CM    1. Pseudophakia of both eyes  Z96.1       2. PXF (pseudoexfoliation of lens capsule)  H26.8       3. Hyperopia, bilateral  H52.03       4. Regular astigmatism of both eyes  H52.223       5. Presbyopia  H52.4           PLAN:     There are no Patient Instructions on file for this visit.

## 2024-08-12 ENCOUNTER — TELEPHONE (OUTPATIENT)
Dept: GASTROENTEROLOGY | Facility: CLINIC | Age: 74
End: 2024-08-12
Payer: MEDICARE

## 2024-08-12 NOTE — TELEPHONE ENCOUNTER
"Endoscopy Scheduling Screen    Have you had a positive Covid test in the last 14 days?  No    What is your communication preference for Instructions and/or Bowel Prep?   MyChart    What insurance is in the chart?  Other:  MEDICARE /AARP    Ordering/Referring Provider: Kizzy Thomason MD in [facility]     (If ordering provider performs procedure, schedule with ordering provider unless otherwise instructed. )    BMI: Estimated body mass index is 33.41 kg/m  as calculated from the following:    Height as of 7/16/24: 1.6 m (5' 3\").    Weight as of 7/16/24: 85.5 kg (188 lb 9.6 oz).     Sedation Ordered  moderate sedation.   If patient BMI > 50 do not schedule in [facility].    If patient BMI > 45 do not schedule at [facility].    Are you taking methadone or Suboxone?  No    Have you had difficulties, pain, or discomfort during past endoscopy procedures?  No    Are you taking any prescription medications for pain 3 or more times per week?   NO, No RN review required.    Do you have a history of malignant hyperthermia?  No    (Females) Are you currently pregnant?   No     Have you been diagnosed or told you have pulmonary hypertension?   No    Do you have an LVAD?  No    Have you been told you have moderate to severe sleep apnea?  No    Have you been told you have COPD, asthma, or any other lung disease?  No    Do you have any heart conditions?  No     Have you ever had or are you waiting for an organ transplant?  No. Continue scheduling, no site restrictions.    Have you had a stroke or transient ischemic attack (TIA aka \"mini stroke\" in the last 6 months?   No    Have you been diagnosed with or been told you have cirrhosis of the liver?   No    Are you currently on dialysis?   No    Do you need assistance transferring?   No    BMI: Estimated body mass index is 33.41 kg/m  as calculated from the following:    Height as of 7/16/24: 1.6 m (5' 3\").    Weight as of 7/16/24: 85.5 kg (188 lb 9.6 oz).     Is patients BMI > 40 " and scheduling location [facility]?  No    Do you take an injectable medication for weight loss or diabetes (excluding insulin)?  No    Do you take the medication Naltrexone?  No    Do you take blood thinners?  No       Prep   Are you currently on dialysis or do you have chronic kidney disease?  No    Do you have a diagnosis of diabetes?  No    Do you have a diagnosis of cystic fibrosis (CF)?  No    On a regular basis do you go 3 -5 days between bowel movements?  No    BMI > 40?  No    Preferred Pharmacy:      [id number] - [facility] [address] [facility]  [address] [facility] [address]  Phone: [phone] Fax: [phone]      Final Scheduling Details     Procedure scheduled  Colonoscopy    Surgeon:  ALBER     Date of procedure:  9/24     Pre-OP / PAC:   No - Not required for this site.    Location  [facility] - [facility] - Patient preference.    Sedation   Moderate Sedation - Per order.      Patient Reminders:   You will receive a call from a Nurse to review instructions and health history.  This assessment must be completed prior to your procedure.  Failure to complete the Nurse assessment may result in the procedure being cancelled.      On the day of your procedure, please designate an adult(s) who can drive you home stay with you for the next 24 hours. The medicines used in the exam will make you sleepy. You will not be able to drive.      You cannot take public transportation, ride share services, or non-medical taxi service without a responsible caregiver.  Medical transport services are allowed with the requirement that a responsible caregiver will receive you at your destination.  We require that drivers and caregivers are confirmed prior to your procedure.

## 2024-09-23 RX ORDER — NALOXONE HYDROCHLORIDE 0.4 MG/ML
0.2 INJECTION, SOLUTION INTRAMUSCULAR; INTRAVENOUS; SUBCUTANEOUS
Status: CANCELLED | OUTPATIENT
Start: 2024-09-23

## 2024-09-23 RX ORDER — PROCHLORPERAZINE MALEATE 5 MG
5 TABLET ORAL EVERY 6 HOURS PRN
Status: CANCELLED | OUTPATIENT
Start: 2024-09-23

## 2024-09-23 RX ORDER — ONDANSETRON 2 MG/ML
4 INJECTION INTRAMUSCULAR; INTRAVENOUS EVERY 6 HOURS PRN
Status: CANCELLED | OUTPATIENT
Start: 2024-09-23

## 2024-09-23 RX ORDER — FLUMAZENIL 0.1 MG/ML
0.2 INJECTION, SOLUTION INTRAVENOUS
Status: CANCELLED | OUTPATIENT
Start: 2024-09-23 | End: 2024-09-23

## 2024-09-23 RX ORDER — ONDANSETRON 4 MG/1
4 TABLET, ORALLY DISINTEGRATING ORAL EVERY 6 HOURS PRN
Status: CANCELLED | OUTPATIENT
Start: 2024-09-23

## 2024-09-23 RX ORDER — NALOXONE HYDROCHLORIDE 0.4 MG/ML
0.4 INJECTION, SOLUTION INTRAMUSCULAR; INTRAVENOUS; SUBCUTANEOUS
Status: CANCELLED | OUTPATIENT
Start: 2024-09-23

## 2024-09-24 ENCOUNTER — HOSPITAL ENCOUNTER (OUTPATIENT)
Facility: AMBULATORY SURGERY CENTER | Age: 74
Discharge: HOME | End: 2024-09-24
Attending: COLON & RECTAL SURGERY | Admitting: COLON & RECTAL SURGERY
Payer: MEDICARE

## 2024-09-24 VITALS
RESPIRATION RATE: 18 BRPM | SYSTOLIC BLOOD PRESSURE: 120 MMHG | TEMPERATURE: 96.9 F | DIASTOLIC BLOOD PRESSURE: 70 MMHG | OXYGEN SATURATION: 97 % | HEART RATE: 73 BPM

## 2024-09-24 LAB — COLONOSCOPY: NORMAL

## 2024-09-24 PROCEDURE — G8907 PT DOC NO EVENTS ON DISCHARG: HCPCS

## 2024-09-24 PROCEDURE — G8918 PT W/O PREOP ORDER IV AB PRO: HCPCS

## 2024-09-24 PROCEDURE — 88305 TISSUE EXAM BY PATHOLOGIST: CPT | Performed by: PATHOLOGY

## 2024-09-24 PROCEDURE — 45380 COLONOSCOPY AND BIOPSY: CPT | Mod: PT

## 2024-09-24 RX ORDER — LIDOCAINE 40 MG/G
CREAM TOPICAL
Status: DISCONTINUED | OUTPATIENT
Start: 2024-09-24 | End: 2024-09-25 | Stop reason: HOSPADM

## 2024-09-24 RX ORDER — FENTANYL CITRATE 50 UG/ML
INJECTION, SOLUTION INTRAMUSCULAR; INTRAVENOUS PRN
Status: DISCONTINUED | OUTPATIENT
Start: 2024-09-24 | End: 2024-09-24 | Stop reason: HOSPADM

## 2024-09-24 RX ORDER — ONDANSETRON 2 MG/ML
4 INJECTION INTRAMUSCULAR; INTRAVENOUS
Status: DISCONTINUED | OUTPATIENT
Start: 2024-09-24 | End: 2024-09-25 | Stop reason: HOSPADM

## 2024-09-24 NOTE — H&P
Pre-Endoscopy History and Physical     Marisol Orta MRN# [id number]   YOB: 1950 Age: 74 year old     Date of Procedure: 09/24/24  Primary care provider: Kizzy Thomason  Type of Endoscopy: Colonoscopy  Reason for Procedure: screening  Type of Anesthesia Anticipated: Moderate Sedation    HPI:    Marisol is a 74 year old female who will be undergoing the above procedure.      Prior colonoscopy: 2019    A history and physical has been performed. The patient's medications and allergies have been reviewed. The risks and benefits of the procedure and the sedation options and risks were discussed with the patient.  All questions were answered and informed consent was obtained.      She denies a personal or family history of anesthesia complications or bleeding disorders. Her mother had colon cancer.     Allergies   Allergen Reactions    Nkda [No Known Drug Allergy]       Allergy to Latex: NO   Allergy to tape: NO   Intolerances: NO     Current Outpatient Medications   Medication Sig Dispense Refill    CALCIUM 600+D PO 2 tablet daily      lisinopril (ZESTRIL) 10 MG tablet TAKE 1 TABLET(10 MG) BY MOUTH DAILY 90 tablet 1    Multiple Vitamins-Minerals (MULTIVITAMIN GUMMIES ADULT PO)       triamterene-HCTZ (DYAZIDE) 37.5-25 MG capsule TAKE 1 CAPSULE BY MOUTH EVERY MORNING 90 capsule 1     Current Facility-Administered Medications   Medication Dose Route Frequency Provider Last Rate Last Admin    lidocaine (LMX4) kit   Topical Q1H PRN Delaney Jones MD        lidocaine 1 % 0.1-1 mL  0.1-1 mL Other Q1H PRDelaney Kinsey MD        ondansetron (ZOFRAN) injection 4 mg  4 mg Intravenous Once PRN Delaney Jones MD        sodium chloride (PF) 0.9% PF flush 3 mL  3 mL Intracatheter Q8H Delaney Jones MD        sodium chloride (PF) 0.9% PF flush 3 mL  3 mL Intracatheter q1 min prDelaeny Kinsey MD           Patient Active Problem List   Diagnosis    Back pain    Osteoarthritis     Hypertension goal BP (blood pressure) < 140/90    Vitreous membranes and strands    CARDIOVASCULAR SCREENING; LDL GOAL LESS THAN 160    Combined form of age-related cataract, right eye    Pseudophakia of both eyes    Lumbar radiculopathy    PXF (pseudoexfoliation of lens capsule)        Past Medical History:   Diagnosis Date    Arthritis     osteoarthritis    Back pain     HTN (hypertension)     Hyperlipidemia LDL goal < 130     Nonsenile cataract         Past Surgical History:   Procedure Laterality Date    APPENDECTOMY      CATARACT IOL, RT/LT  2017    Dr Jiménez    COLONOSCOPY  2012?    COLONOSCOPY WITH CO2 INSUFFLATION N/A 9/12/2019    Procedure: COLONOSCOPY, WITH CO2 INSUFFLATION;  Surgeon: Jose Preston MD;  Location: [facility]    FOOT SURGERY      left foot 1st  fusion    ORTHOPEDIC SURGERY      PHACOEMULSIFICATION CLEAR CORNEA WITH STANDARD INTRAOCULAR LENS IMPLANT Left 6/6/2017    Procedure: PHACOEMULSIFICATION CLEAR CORNEA WITH STANDARD INTRAOCULAR LENS IMPLANT;  LEFT EYE PHACOEMULSIFICATION CLEAR CORNEA WITH STANDARD INTRAOCULAR LENS IMPLANT ;  Surgeon: Caprice Jiménez MD;  Location: [facility]    PHACOEMULSIFICATION CLEAR CORNEA WITH STANDARD INTRAOCULAR LENS IMPLANT Right 6/20/2017    Procedure: PHACOEMULSIFICATION CLEAR CORNEA WITH STANDARD INTRAOCULAR LENS IMPLANT;  RIGHT EYE PHACOEMULSIFICATION CLEAR CORNEA WITH STANDARD INTRAOCULAR LENS IMPLANT;  Surgeon: Caprice Jiménez MD;  Location: [facility]       Social History     Tobacco Use    Smoking status: Never    Smokeless tobacco: Never    Tobacco comments:     smoke free household   Substance Use Topics    Alcohol use: No       Family History   Problem Relation Age of Onset    Lipids Mother     Cancer - colorectal Mother 70    Hypertension Mother     Arthritis Mother     Cancer Mother 84        lung cancer     Thyroid Disease Mother     Colon Cancer Mother     Other Cancer Mother     Arthritis Father     Hypertension Father     Macular  "Degeneration Father 85        monitors visison with [employer] grid, more trouble reading    Depression Father     Anxiety Disorder Father     Diabetes Maternal Grandmother     Diabetes Paternal Grandmother     Asthma Brother     Depression Brother     Anxiety Disorder Brother     Thyroid Disease Brother     Hypertension Sister     Thyroid Disease Sister     Eye Disorder Son 25        keratoconus?    Glaucoma Daughter 18        montoring pressure    Glaucoma Paternal Aunt     Diabetes Paternal Aunt     Hypertension Sister     Thyroid Disease Sister     Depression Brother     Anxiety Disorder Brother     Asthma Brother     Thyroid Disease Brother     Cerebrovascular Disease No family hx of        REVIEW OF SYSTEMS:     5 point ROS negative except as noted above in HPI, including Gen., Resp., CV, GI &  system review.      PHYSICAL EXAM:   /65   Temp 97  F (36.1  C) (Temporal)   Resp 16   SpO2 98%  Estimated body mass index is 33.41 kg/m  as calculated from the following:    Height as of 7/16/24: 1.6 m (5' 3\").    Weight as of 7/16/24: 85.5 kg (188 lb 9.6 oz).   All Vitals have been reviewed.    GENERAL APPEARANCE: healthy and alert  MENTAL STATUS: alert  AIRWAY EXAM: Mallampatti Class I (visualization of the soft palate, fauces, uvula, anterior and posterior pillars)  RESP: lungs clear to auscultation - no rales, rhonchi or wheezes  CV: regular rates and rhythm      IMPRESSION   ASA Class 2 - Mild systemic disease        PLAN:     Plan for colonoscopy. We discussed the risks, benefits and alternatives and the patient wished to proceed.    The above has been forwarded to the consulting provider.    Delaney Jones MD, MS, FACS, FASCRS  [facility]  Office: [phone]  9/24/2024 8:35 AM         "

## 2024-09-25 LAB
PATH REPORT.COMMENTS IMP SPEC: NORMAL
PATH REPORT.COMMENTS IMP SPEC: NORMAL
PATH REPORT.FINAL DX SPEC: NORMAL
PATH REPORT.GROSS SPEC: NORMAL
PATH REPORT.MICROSCOPIC SPEC OTHER STN: NORMAL
PATH REPORT.RELEVANT HX SPEC: NORMAL
PHOTO IMAGE: NORMAL

## 2024-12-11 DIAGNOSIS — I10 HYPERTENSION GOAL BP (BLOOD PRESSURE) < 140/90: ICD-10-CM

## 2024-12-11 RX ORDER — TRIAMTERENE AND HYDROCHLOROTHIAZIDE 37.5; 25 MG/1; MG/1
1 CAPSULE ORAL EVERY MORNING
Qty: 90 CAPSULE | Refills: 1 | Status: SHIPPED | OUTPATIENT
Start: 2024-12-11

## 2024-12-11 RX ORDER — LISINOPRIL 10 MG/1
10 TABLET ORAL DAILY
Qty: 90 TABLET | Refills: 1 | Status: SHIPPED | OUTPATIENT
Start: 2024-12-11

## 2025-04-03 ENCOUNTER — OFFICE VISIT (OUTPATIENT)
Dept: FAMILY MEDICINE | Facility: CLINIC | Age: 75
End: 2025-04-03
Payer: MEDICARE

## 2025-04-03 VITALS
HEIGHT: 63 IN | DIASTOLIC BLOOD PRESSURE: 62 MMHG | TEMPERATURE: 97.2 F | OXYGEN SATURATION: 98 % | SYSTOLIC BLOOD PRESSURE: 124 MMHG | WEIGHT: 192 LBS | RESPIRATION RATE: 24 BRPM | HEART RATE: 88 BPM | BODY MASS INDEX: 34.02 KG/M2

## 2025-04-03 DIAGNOSIS — J34.89 NASAL DRYNESS: ICD-10-CM

## 2025-04-03 DIAGNOSIS — L57.0 ACTINIC KERATOSIS OF SCALP: Primary | ICD-10-CM

## 2025-04-03 ASSESSMENT — PAIN SCALES - GENERAL: PAINLEVEL_OUTOF10: NO PAIN (0)

## 2025-04-03 NOTE — PROGRESS NOTES
"  Assessment & Plan     (L57.0) Actinic keratosis of scalp  (primary encounter diagnosis)  Comment: benign appearing  Plan: reassurance, reviewed abcd of skin lesions, plan refer to derm for any change  Wear hats at all times outside in the sun    (J34.89) Nasal dryness  Comment: due to weather changes  Plan: continue nasal saline, consider aquaphor if more needed      The longitudinal plan of care for the diagnosis(es)/condition(s) as documented were addressed during this visit. Due to the added complexity in care, I will continue to support Marisol in the subsequent management and with ongoing continuity of care.    BMI  Estimated body mass index is 33.74 kg/m  as calculated from the following:    Height as of this encounter: 1.607 m (5' 3.25\").    Weight as of this encounter: 87.1 kg (192 lb).   Weight management plan: Discussed healthy diet and exercise guidelines      See Patient Instructions    Subjective   Marisol is a 74 year old, presenting for the following health issues:  Derm Problem (Derm concern on scalp )        4/3/2025     8:35 AM   Additional Questions   Roomed by Joe ESPAÑA MA     History of Present Illness       Reason for visit:  Scabs on scalp  Symptom onset:  More than a month  Symptoms include:  Scabs on scalp  Symptom intensity:  Mild  Symptom progression:  Staying the same  Had these symptoms before:  No   She is taking medications regularly.      Noticed around January 4 hard, raised, spots  Some flakiness, non-itchy  Has not noticed any significant hair loss  Has not tried anything    Bloody nose  Last episode Monday   More in the winter due to dry weather  Used to use Vaseline  Switched to saline spray and gel with improvement              Review of Systems  Constitutional, HEENT, cardiovascular, pulmonary, gi and gu systems are negative, except as otherwise noted.      Objective    /62   Pulse 88   Temp 97.2  F (36.2  C) (Tympanic)   Resp 24   Ht 1.607 m (5' 3.25\")   Wt 87.1 kg " (192 lb)   LMP  (LMP Unknown)   SpO2 98%   Breastfeeding No   BMI 33.74 kg/m    Body mass index is 33.74 kg/m .    Physical Exam   GENERAL: alert and no distress, very thin hair, scalp with high sun exposure  SKIN: no suspicious lesions or rashes and keratoses - actinic, 4 spots on scalp c/w actinic keratosis  EYES: Eyes grossly normal to inspection, PERRL and conjunctivae and sclerae normal  MS: no gross musculoskeletal defects noted, no edema  PSYCH: mentation appears normal, affect normal/bright             Signed Electronically by: Kizzy Thomason MD

## 2025-06-06 DIAGNOSIS — I10 HYPERTENSION GOAL BP (BLOOD PRESSURE) < 140/90: ICD-10-CM

## 2025-06-09 RX ORDER — LISINOPRIL 10 MG/1
10 TABLET ORAL DAILY
Qty: 90 TABLET | Refills: 1 | Status: SHIPPED | OUTPATIENT
Start: 2025-06-09

## 2025-06-09 RX ORDER — TRIAMTERENE AND HYDROCHLOROTHIAZIDE 37.5; 25 MG/1; MG/1
1 CAPSULE ORAL EVERY MORNING
Qty: 90 CAPSULE | Refills: 1 | Status: SHIPPED | OUTPATIENT
Start: 2025-06-09

## 2025-07-15 ENCOUNTER — LAB (OUTPATIENT)
Dept: LAB | Facility: CLINIC | Age: 75
End: 2025-07-15
Payer: MEDICARE

## 2025-07-15 DIAGNOSIS — I10 HYPERTENSION GOAL BP (BLOOD PRESSURE) < 140/90: ICD-10-CM

## 2025-07-15 LAB
ANION GAP SERPL CALCULATED.3IONS-SCNC: 9 MMOL/L (ref 7–15)
BUN SERPL-MCNC: 20 MG/DL (ref 8–23)
CALCIUM SERPL-MCNC: 9.5 MG/DL (ref 8.8–10.4)
CHLORIDE SERPL-SCNC: 106 MMOL/L (ref 98–107)
CHOLEST SERPL-MCNC: 209 MG/DL
CREAT SERPL-MCNC: 0.78 MG/DL (ref 0.51–0.95)
EGFRCR SERPLBLD CKD-EPI 2021: 80 ML/MIN/1.73M2
FASTING STATUS PATIENT QL REPORTED: YES
FASTING STATUS PATIENT QL REPORTED: YES
GLUCOSE SERPL-MCNC: 108 MG/DL (ref 70–99)
HCO3 SERPL-SCNC: 26 MMOL/L (ref 22–29)
HDLC SERPL-MCNC: 72 MG/DL
LDLC SERPL CALC-MCNC: 117 MG/DL
NONHDLC SERPL-MCNC: 137 MG/DL
POTASSIUM SERPL-SCNC: 3.7 MMOL/L (ref 3.4–5.3)
SODIUM SERPL-SCNC: 141 MMOL/L (ref 135–145)
TRIGL SERPL-MCNC: 101 MG/DL

## 2025-07-15 PROCEDURE — 36415 COLL VENOUS BLD VENIPUNCTURE: CPT

## 2025-07-15 PROCEDURE — 80061 LIPID PANEL: CPT

## 2025-07-15 PROCEDURE — 80048 BASIC METABOLIC PNL TOTAL CA: CPT

## 2025-07-21 SDOH — HEALTH STABILITY: PHYSICAL HEALTH: ON AVERAGE, HOW MANY DAYS PER WEEK DO YOU ENGAGE IN MODERATE TO STRENUOUS EXERCISE (LIKE A BRISK WALK)?: 5 DAYS

## 2025-07-21 SDOH — HEALTH STABILITY: PHYSICAL HEALTH: ON AVERAGE, HOW MANY MINUTES DO YOU ENGAGE IN EXERCISE AT THIS LEVEL?: 40 MIN

## 2025-07-21 ASSESSMENT — SOCIAL DETERMINANTS OF HEALTH (SDOH): HOW OFTEN DO YOU GET TOGETHER WITH FRIENDS OR RELATIVES?: MORE THAN THREE TIMES A WEEK

## 2025-07-22 ENCOUNTER — OFFICE VISIT (OUTPATIENT)
Dept: FAMILY MEDICINE | Facility: CLINIC | Age: 75
End: 2025-07-22
Payer: MEDICARE

## 2025-07-22 VITALS
WEIGHT: 190 LBS | HEIGHT: 63 IN | BODY MASS INDEX: 33.66 KG/M2 | SYSTOLIC BLOOD PRESSURE: 137 MMHG | RESPIRATION RATE: 12 BRPM | OXYGEN SATURATION: 98 % | TEMPERATURE: 96.9 F | HEART RATE: 78 BPM | DIASTOLIC BLOOD PRESSURE: 75 MMHG

## 2025-07-22 DIAGNOSIS — Z00.00 ENCOUNTER FOR MEDICARE ANNUAL WELLNESS EXAM: Primary | ICD-10-CM

## 2025-07-22 DIAGNOSIS — E28.39 ESTROGEN DEFICIENCY: ICD-10-CM

## 2025-07-22 DIAGNOSIS — I10 HYPERTENSION GOAL BP (BLOOD PRESSURE) < 140/90: ICD-10-CM

## 2025-07-22 DIAGNOSIS — E78.5 HYPERLIPIDEMIA LDL GOAL <100: ICD-10-CM

## 2025-07-22 PROCEDURE — 3075F SYST BP GE 130 - 139MM HG: CPT | Performed by: FAMILY MEDICINE

## 2025-07-22 PROCEDURE — G0439 PPPS, SUBSEQ VISIT: HCPCS | Performed by: FAMILY MEDICINE

## 2025-07-22 PROCEDURE — 1126F AMNT PAIN NOTED NONE PRSNT: CPT | Performed by: FAMILY MEDICINE

## 2025-07-22 PROCEDURE — 3078F DIAST BP <80 MM HG: CPT | Performed by: FAMILY MEDICINE

## 2025-07-22 PROCEDURE — G2211 COMPLEX E/M VISIT ADD ON: HCPCS | Performed by: FAMILY MEDICINE

## 2025-07-22 PROCEDURE — 99214 OFFICE O/P EST MOD 30 MIN: CPT | Mod: 25 | Performed by: FAMILY MEDICINE

## 2025-07-22 RX ORDER — TRIAMTERENE AND HYDROCHLOROTHIAZIDE 37.5; 25 MG/1; MG/1
1 CAPSULE ORAL EVERY MORNING
Qty: 90 CAPSULE | Refills: 1 | Status: SHIPPED | OUTPATIENT
Start: 2025-07-22

## 2025-07-22 RX ORDER — LISINOPRIL 10 MG/1
10 TABLET ORAL DAILY
Qty: 90 TABLET | Refills: 1 | Status: SHIPPED | OUTPATIENT
Start: 2025-07-22

## 2025-07-22 ASSESSMENT — PAIN SCALES - GENERAL: PAINLEVEL_OUTOF10: NO PAIN (0)

## 2025-07-22 NOTE — PROGRESS NOTES
Preventive Care Visit  [facility]  Kizzy Thomason MD, Family Medicine  Jul 22, 2025      Assessment & Plan     (Z00.00) Encounter for Medicare annual wellness exam  (primary encounter diagnosis)  Comment: preventive needs reviewed   Plan: see orders in Epic.             (I10) Hypertension goal BP (blood pressure) < 140/90  Comment: to goal  Plan: lisinopril (ZESTRIL) 10 MG tablet,         triamterene-HCTZ (DYAZIDE) 37.5-25 MG capsule         Refill x 6 months     (E78.5) Hyperlipidemia LDL goal <100  Comment: elevated, risk over 7.5%  Plan: CT Coronary Calcium Scan        Plan scan and reassess    (E28.39) Estrogen deficiency  Comment: due  Plan: DX Bone Density    The longitudinal plan of care for the diagnosis(es)/condition(s) as documented were addressed during this visit. Due to the added complexity in care, I will continue to support Marisol in the subsequent management and with ongoing continuity of care.              Counseling  Appropriate preventive services were addressed with this patient via screening, questionnaire, or discussion as appropriate for fall prevention, nutrition, physical activity, Tobacco-use cessation, social engagement, weight loss and cognition.  Checklist reviewing preventive services available has been given to the patient.  Reviewed patient's diet, addressing concerns and/or questions.           Paul Damon is a 75 year old, presenting for the following:  Wellness Visit        7/22/2025     7:42 AM   Additional Questions   Roomed by Angella   Accompanied by self         7/22/2025     7:42 AM   Patient Reported Additional Medications   Patient reports taking the following new medications n/a          HPI  Doing well, no concerns         Advance Care Planning    Patient states has Health Care Directive and will send to [employer].        7/21/2025   General Health   How would you rate your overall physical health? Good   Feel stress (tense, anxious, or  unable to sleep) Not at all         7/21/2025   Nutrition   Diet: Regular (no restrictions)         7/21/2025   Exercise   Days per week of moderate/strenous exercise 5 days   Average minutes spent exercising at this level 40 min         7/21/2025   Social Factors   Frequency of gathering with friends or relatives More than three times a week   Worry food won't last until get money to buy more No   Food not last or not have enough money for food? No   Do you have housing? (Housing is defined as stable permanent housing and does not include staying outside in a car, in a tent, in an abandoned building, in an overnight shelter, or couch-surfing.) Yes   Are you worried about losing your housing? No   Lack of transportation? No   Unable to get utilities (heat,electricity)? No         7/21/2025   Fall Risk   Fallen 2 or more times in the past year? No   Trouble with walking or balance? No          7/21/2025   Activities of Daily Living- Home Safety   Needs help with the following daily activites None of the above   Safety concerns in the home None of the above         7/21/2025   Dental   Dentist two times every year? Yes         7/21/2025   Hearing Screening   Hearing concerns? None of the above         7/21/2025   Driving Risk Screening   Patient/family members have concerns about driving No         7/21/2025   General Alertness/Fatigue Screening   Have you been more tired than usual lately? No         7/21/2025   Urinary Incontinence Screening   Bothered by leaking urine in past 6 months No         Today's PHQ-2 Score:       7/21/2025     6:42 AM   PHQ-2 ( 1999 [employer])   Q1: Little interest or pleasure in doing things 0   Q2: Feeling down, depressed or hopeless 0   PHQ-2 Score 0    Q1: Little interest or pleasure in doing things Not at all   Q2: Feeling down, depressed or hopeless Not at all   PHQ-2 Score 0       Patient-reported           7/21/2025   Substance Use   Alcohol more than 3/day or more than 7/wk No   Do  you have a current opioid prescription? No   How severe/bad is pain from 1 to 10? /10   Do you use any other substances recreationally? No     Social History     Tobacco Use    Smoking status: Never    Smokeless tobacco: Never    Tobacco comments:     smoke free household   Vaping Use    Vaping status: Never Used   Substance Use Topics    Alcohol use: No    Drug use: No           2024   LAST FHS-7 RESULTS   1st degree relative breast or ovarian cancer No   Any relative bilateral breast cancer No   Any male have breast cancer No   Any ONE woman have BOTH breast AND ovarian cancer No   Any woman with breast cancer before 50yrs No   2 or more relatives with breast AND/OR ovarian cancer No   2 or more relatives with breast AND/OR bowel cancer No        Mammogram Screening - Mammogram every 1-2 years updated in Health Maintenance based on mutual decision making    G 2 P 2   No LMP recorded (lmp unknown). Patient is postmenopausal.     Fasting: No   Td: tdap 2020       Flu: 10/2024      Covid: 10/2024      Shingrix: done      PPV: done       RSV: 10/2024               Cholesterol:   Lab Results   Component Value Date    CHOL 209 07/15/2025    CHOL 189 2019     Lab Results   Component Value Date    HDL 72 07/15/2025    HDL 73 2019     Lab Results   Component Value Date     07/15/2025    LDL 90 2019     Lab Results   Component Value Date    TRIG 101 07/15/2025    TRIG 130 2019     Lab Results   Component Value Date    CHOLHDLRATIO 2.6 2014         MM2024  Dexa:  2017     Flex/colo: 2024 q5y scope      Seat Belt: Yes    Sunscreen use: Yes   Calcium Intake: adeq  Health Care Directive: Yes   Sexually Active: Yes     Current contraception: none  History of abnormal Pap smear: No  Family history of colon/breast/ovarian cancer: Yes: see [facility]  Regular self breast exam: Yes  History of abnormal mammogram: No      ASCVD Risk   The 10-year ASCVD risk score (Chanelle MEJIA, et  al., 2019) is: 19.3%    Values used to calculate the score:      Age: 75 years      Sex: Female      Is Non- : No      Diabetic: No      Tobacco smoker: No      Systolic Blood Pressure: 137 mmHg      Is BP treated: Yes      HDL Cholesterol: 72 mg/dL      Total Cholesterol: 209 mg/dL            Reviewed and updated as needed this visit by Provider   Tobacco  Allergies  Meds  Problems  Med Hx  Surg Hx  Fam Hx            Labs reviewed in EPIC  BP Readings from Last 3 Encounters:   07/22/25 137/75   04/03/25 124/62   09/24/24 120/70    Wt Readings from Last 3 Encounters:   07/22/25 86.2 kg (190 lb)   04/03/25 87.1 kg (192 lb)   07/16/24 85.5 kg (188 lb 9.6 oz)                  Patient Active Problem List   Diagnosis    Back pain    Osteoarthritis    Hypertension goal BP (blood pressure) < 140/90    Vitreous membranes and strands    CARDIOVASCULAR SCREENING; LDL GOAL LESS THAN 160    Combined form of age-related cataract, right eye    Pseudophakia of both eyes    Lumbar radiculopathy    PXF (pseudoexfoliation of lens capsule)     Past Surgical History:   Procedure Laterality Date    APPENDECTOMY      CATARACT IOL, RT/LT  2017    Dr Jiménez    COLONOSCOPY  2012?    COLONOSCOPY N/A 9/24/2024    Procedure: COLONOSCOPY, WITH POLYPECTOMY AND BIOPSY;  Surgeon: Delaney Jones MD;  Location: [facility]    COLONOSCOPY WITH CO2 INSUFFLATION N/A 9/12/2019    Procedure: COLONOSCOPY, WITH CO2 INSUFFLATION;  Surgeon: Jose Preston MD;  Location: [facility]    COLONOSCOPY WITH CO2 INSUFFLATION N/A 9/24/2024    Procedure: Colonoscopy with CO2 insufflation;  Surgeon: Delaney Jones MD;  Location: [facility]    FOOT SURGERY      left foot 1st  fusion    ORTHOPEDIC SURGERY      PHACOEMULSIFICATION CLEAR CORNEA WITH STANDARD INTRAOCULAR LENS IMPLANT Left 6/6/2017    Procedure: PHACOEMULSIFICATION CLEAR CORNEA WITH STANDARD INTRAOCULAR LENS IMPLANT;  LEFT EYE PHACOEMULSIFICATION CLEAR CORNEA WITH  STANDARD INTRAOCULAR LENS IMPLANT ;  Surgeon: Caprice Jiménez MD;  Location: [facility]    PHACOEMULSIFICATION CLEAR CORNEA WITH STANDARD INTRAOCULAR LENS IMPLANT Right 6/20/2017    Procedure: PHACOEMULSIFICATION CLEAR CORNEA WITH STANDARD INTRAOCULAR LENS IMPLANT;  RIGHT EYE PHACOEMULSIFICATION CLEAR CORNEA WITH STANDARD INTRAOCULAR LENS IMPLANT;  Surgeon: Caprice Jiménez MD;  Location: [facility]       Social History     Tobacco Use    Smoking status: Never    Smokeless tobacco: Never    Tobacco comments:     smoke free household   Substance Use Topics    Alcohol use: No     Family History   Problem Relation Age of Onset    Lipids Mother     Cancer - colorectal Mother 70    Hypertension Mother     Arthritis Mother     Cancer Mother         lung cancer     Thyroid Disease Mother     Colon Cancer Mother     Other Cancer Mother     Arthritis Father     Hypertension Father     Macular Degeneration Father 85        monitors visison with [employer] grid, more trouble reading    Depression Father     Anxiety Disorder Father     Diabetes Maternal Grandmother     Diabetes Paternal Grandmother     Asthma Brother     Depression Brother     Anxiety Disorder Brother     Thyroid Disease Brother     Hypertension Sister     Thyroid Disease Sister     Eye Disorder Son 25        keratoconus?    Glaucoma Daughter 18        montoring pressure    Glaucoma Paternal Aunt     Diabetes Paternal Aunt     Hypertension Sister     Thyroid Disease Sister     Depression Brother     Anxiety Disorder Brother     Asthma Brother     Thyroid Disease Brother     Hypertension Sister     Thyroid Disease Sister     Depression Brother     Diabetes Brother     Anxiety Disorder Brother     Asthma Brother     Thyroid Disease Brother     Cerebrovascular Disease No family hx of          Current Outpatient Medications   Medication Sig Dispense Refill    CALCIUM 600+D PO 2 tablet daily      lisinopril (ZESTRIL) 10 MG tablet TAKE 1 TABLET(10 MG) BY MOUTH DAILY 90  "tablet 1    Multiple Vitamins-Minerals (MULTIVITAMIN GUMMIES ADULT PO)       triamterene-HCTZ (DYAZIDE) 37.5-25 MG capsule TAKE 1 CAPSULE BY MOUTH EVERY MORNING 90 capsule 1     Current providers sharing in care for this patient include:  Patient Care Team:  Kizzy Thomason MD as PCP - General (Family Practice)  Kizzy Thomason MD as Assigned PCP  Dilma Soliz MD as MD (Dermatology)  Sindhu Leach OD as Assigned Surgical Provider  Sindhu Leach OD (Optometry)    The following health maintenance items are reviewed in Epic and correct as of today:  Health Maintenance   Topic Date Due    ANNUAL REVIEW OF HM ORDERS  Never done    COVID-19 VACCINE (6 - 2024-25 season) 04/10/2025    EYE EXAM  08/08/2025    INFLUENZA VACCINE (1) 09/01/2025    BMP  01/15/2026    MAMMO SCREENING  05/30/2026    MEDICARE ANNUAL WELLNESS VISIT  07/22/2026    FALL RISK ASSESSMENT  07/22/2026    DEXA  07/31/2027    DIABETES SCREENING  07/15/2028    COLORECTAL CANCER SCREENING  09/24/2029    LIPID  07/15/2030    ADVANCE CARE PLANNING  07/22/2030    DTAP/TDAP/TD VACCINE (3 - Td or Tdap) 09/29/2030    HEPATITIS C SCREENING  Completed    PHQ-2 (once per calendar year)  Completed    PNEUMOCOCCAL VACCINE 50+ YEARS  Completed    ZOSTER VACCINE  Completed    RSV VACCINE  Completed    HPV VACCINE  Aged Out    MENINGITIS VACCINE  Aged Out         Review of Systems  Constitutional, neuro, ENT, endocrine, pulmonary, cardiac, gastrointestinal, genitourinary, musculoskeletal, integument and psychiatric systems are negative, except as otherwise noted.     Objective    Exam  /75   Pulse 78   Temp 96.9  F (36.1  C) (Tympanic)   Resp 12   Ht 1.588 m (5' 2.5\")   Wt 86.2 kg (190 lb)   LMP  (LMP Unknown)   SpO2 98%   BMI 34.20 kg/m     Estimated body mass index is 34.2 kg/m  as calculated from the following:    Height as of this encounter: 1.588 m (5' 2.5\").    Weight as of this encounter: 86.2 kg (190 lb).    Physical " Exam  GENERAL: alert and no distress  EYES: Eyes grossly normal to inspection, PERRL and conjunctivae and sclerae normal  HENT: ear canals and TM's normal, nose and mouth without ulcers or lesions  NECK: no adenopathy, no asymmetry, masses, or scars  RESP: lungs clear to auscultation - no rales, rhonchi or wheezes  CV: regular rate and rhythm, normal S1 S2, no S3 or S4, no murmur, click or rub, no peripheral edema  ABDOMEN: soft, nontender, no hepatosplenomegaly, no masses and bowel sounds normal  MS: no gross musculoskeletal defects noted, no edema  SKIN: no suspicious lesions or rashes  NEURO: Normal strength and tone, mentation intact and speech normal  PSYCH: mentation appears normal, affect normal/bright        7/22/2025   Mini Cog   Mini-Cog Not Completed (choose reason) Patient declines   Normal cognition based on my direct observation during interview and exam.     Patient declines, there are NO concerns for cognitive deficits.           Signed Electronically by: Kizzy Thomason MD

## 2025-07-22 NOTE — PATIENT INSTRUCTIONS
Patient Education   Preventive Care Advice   This is general advice given by our system to help you stay healthy. However, your care team may have specific advice just for you. Please talk to your care team about your preventive care needs.  Nutrition  Eat 5 or more servings of fruits and vegetables each day.  Try wheat bread, brown rice and whole grain pasta (instead of white bread, rice, and pasta).  Get enough calcium and vitamin D. Check the label on foods and aim for 100% of the RDA (recommended daily allowance).  Lifestyle  Exercise at least 150 minutes each week  (30 minutes a day, 5 days a week).  Do muscle strengthening activities 2 days a week. These help control your weight and prevent disease.  No smoking.  Wear sunscreen to prevent skin cancer.  Have a dental exam and cleaning every 6 months.  Yearly exams  See your health care team every year to talk about:  Any changes in your health.  Any medicines your care team has prescribed.  Preventive care, family planning, and ways to prevent chronic diseases.  Shots (vaccines)   HPV shots (up to age 26), if you've never had them before.  Hepatitis B shots (up to age 59), if you've never had them before.  COVID-19 shot: Get this shot when it's due.  Flu shot: Get a flu shot every year.  Tetanus shot: Get a tetanus shot every 10 years.  Pneumococcal, hepatitis A, and RSV shots: Ask your care team if you need these based on your risk.  Shingles shot (for age 50 and up)  General health tests  Diabetes screening:  Starting at age 35, Get screened for diabetes at least every 3 years.  If you are younger than age 35, ask your care team if you should be screened for diabetes.  Cholesterol test: At age 39, start having a cholesterol test every 5 years, or more often if advised.  Bone density scan (DEXA): At age 50, ask your care team if you should have this scan for osteoporosis (brittle bones).  Hepatitis C: Get tested at least once in your life.  STIs (sexually  transmitted infections)  Before age 24: Ask your care team if you should be screened for STIs.  After age 24: Get screened for STIs if you're at risk. You are at risk for STIs (including HIV) if:  You are sexually active with more than one person.  You don't use condoms every time.  You or a partner was diagnosed with a sexually transmitted infection.  If you are at risk for HIV, ask about PrEP medicine to prevent HIV.  Get tested for HIV at least once in your life, whether you are at risk for HIV or not.  Cancer screening tests  Cervical cancer screening: If you have a cervix, begin getting regular cervical cancer screening tests starting at age 21.  Breast cancer scan (mammogram): If you've ever had breasts, begin having regular mammograms starting at age 40. This is a scan to check for breast cancer.  Colon cancer screening: It is important to start screening for colon cancer at age 45.  Have a colonoscopy test every 10 years (or more often if you're at risk) Or, ask your provider about stool tests like a FIT test every year or Cologuard test every 3 years.  To learn more about your testing options, visit:   .  For help making a decision, visit:   https://bit.ly/yq08396.  Prostate cancer screening test: If you have a prostate, ask your care team if a prostate cancer screening test (PSA) at age 55 is right for you.  Lung cancer screening: If you are a current or former smoker ages 50 to 80, ask your care team if ongoing lung cancer screenings are right for you.  For informational purposes only. Not to replace the advice of your health care provider. Copyright   2023 [employer]. All rights reserved. Clinically reviewed by the [facility] Transitions Program. [employer] [id number] - REV 01/24.

## 2025-08-01 ENCOUNTER — MYC MEDICAL ADVICE (OUTPATIENT)
Dept: FAMILY MEDICINE | Facility: CLINIC | Age: 75
End: 2025-08-01
Payer: MEDICARE

## 2025-08-01 DIAGNOSIS — E78.5 HYPERLIPIDEMIA LDL GOAL <100: Primary | ICD-10-CM

## 2025-08-02 RX ORDER — ATORVASTATIN CALCIUM 10 MG/1
10 TABLET, FILM COATED ORAL DAILY
Qty: 90 TABLET | Refills: 3 | Status: SHIPPED | OUTPATIENT
Start: 2025-08-02

## 2025-08-05 ENCOUNTER — ANCILLARY PROCEDURE (OUTPATIENT)
Dept: BONE DENSITY | Facility: CLINIC | Age: 75
End: 2025-08-05
Attending: FAMILY MEDICINE
Payer: MEDICARE

## 2025-08-05 DIAGNOSIS — E28.39 ESTROGEN DEFICIENCY: ICD-10-CM

## 2025-08-05 PROCEDURE — 77080 DXA BONE DENSITY AXIAL: CPT | Mod: TC | Performed by: RADIOLOGY

## 2025-08-12 ENCOUNTER — OFFICE VISIT (OUTPATIENT)
Dept: OPTOMETRY | Facility: CLINIC | Age: 75
End: 2025-08-12
Payer: MEDICARE

## 2025-08-12 DIAGNOSIS — H52.223 REGULAR ASTIGMATISM OF BOTH EYES: ICD-10-CM

## 2025-08-12 DIAGNOSIS — H52.03 HYPEROPIA, BILATERAL: ICD-10-CM

## 2025-08-12 DIAGNOSIS — Z96.1 PSEUDOPHAKIA OF BOTH EYES: Primary | ICD-10-CM

## 2025-08-12 DIAGNOSIS — H26.8 PXF (PSEUDOEXFOLIATION OF LENS CAPSULE): ICD-10-CM

## 2025-08-12 DIAGNOSIS — H52.4 PRESBYOPIA: ICD-10-CM

## 2025-08-12 PROCEDURE — 92015 DETERMINE REFRACTIVE STATE: CPT | Mod: GY | Performed by: OPTOMETRIST

## 2025-08-12 PROCEDURE — 92014 COMPRE OPH EXAM EST PT 1/>: CPT | Performed by: OPTOMETRIST

## 2025-08-12 ASSESSMENT — SLIT LAMP EXAM - LIDS: COMMENTS: NORMAL

## 2025-08-12 ASSESSMENT — REFRACTION_MANIFEST
OD_AXIS: 029
METHOD_AUTOREFRACTION: 1
OS_SPHERE: -0.75
OS_SPHERE: -0.25
OS_ADD: +2.75
OD_SPHERE: -0.50
OD_AXIS: 030
OD_CYLINDER: +1.25
OD_CYLINDER: +1.25
OS_CYLINDER: +1.00
OD_SPHERE: -0.50
OS_CYLINDER: +1.25
OD_ADD: +2.75
OS_AXIS: 110
OS_AXIS: 115

## 2025-08-12 ASSESSMENT — VISUAL ACUITY
METHOD: SNELLEN - LINEAR
OS_CC: 20/20
OD_CC: 20/20
OS_SC: 20/30
OS_CC: 20/20
OS_SC: 20/70
OD_CC: 20/25+2
OD_CC+: -1
CORRECTION_TYPE: GLASSES
OD_SC: 20/40
OD_SC: 20/200
OS_SC+: -1
OD_SC+: -1

## 2025-08-12 ASSESSMENT — TONOMETRY
OD_IOP_MMHG: 18
IOP_METHOD: APPLANATION
OS_IOP_MMHG: 18

## 2025-08-12 ASSESSMENT — CONF VISUAL FIELD
OS_INFERIOR_TEMPORAL_RESTRICTION: 0
OD_SUPERIOR_NASAL_RESTRICTION: 0
OD_INFERIOR_NASAL_RESTRICTION: 0
OD_INFERIOR_TEMPORAL_RESTRICTION: 0
OS_SUPERIOR_NASAL_RESTRICTION: 0
OS_NORMAL: 1
OD_NORMAL: 1
OS_INFERIOR_NASAL_RESTRICTION: 0
METHOD: COUNTING FINGERS
OD_SUPERIOR_TEMPORAL_RESTRICTION: 0
OS_SUPERIOR_TEMPORAL_RESTRICTION: 0

## 2025-08-12 ASSESSMENT — KERATOMETRY
OS_K2POWER_DIOPTERS: 43.50
OS_K1POWER_DIOPTERS: 42.25
OS_AXISANGLE2_DEGREES: 20
OD_AXISANGLE2_DEGREES: 38
OD_K2POWER_DIOPTERS: 42.00
OD_K1POWER_DIOPTERS: 43.00

## 2025-08-12 ASSESSMENT — REFRACTION_WEARINGRX
OS_AXIS: 115
OS_SPHERE: -0.25
OD_CYLINDER: +1.00
OS_ADD: +2.75
OD_ADD: +2.75
OD_SPHERE: -0.50
OS_CYLINDER: +1.00
OD_AXIS: 045
SPECS_TYPE: PAL

## 2025-08-12 ASSESSMENT — PACHYMETRY
OD_CT(UM): .605
OS_CT(UM): .601

## 2025-08-12 ASSESSMENT — EXTERNAL EXAM - LEFT EYE: OS_EXAM: NORMAL

## 2025-08-12 ASSESSMENT — EXTERNAL EXAM - RIGHT EYE: OD_EXAM: NORMAL

## 2025-08-12 ASSESSMENT — CUP TO DISC RATIO
OS_RATIO: 0.2
OD_RATIO: 0.2

## (undated) DEVICE — GLOVE PROTEXIS W/NEU-THERA 7.0  2D73TE70

## (undated) DEVICE — GLOVE PROTEXIS MICRO 7.0  2D73PM70

## (undated) DEVICE — EYE TIP IRRIGATION & ASPIRATION POLYMER 35D BENT 8065751511

## (undated) DEVICE — EYE KNIFE SLIT XSTAR VISITEC 2.4MM 45DEG BEVEL UP 373724

## (undated) DEVICE — EYE SHIELD PLASTIC

## (undated) DEVICE — PREP CHLORAPREP 26ML TINTED ORANGE  260815

## (undated) DEVICE — GLOVE PROTEXIS MICRO 6.5  2D73PM65

## (undated) DEVICE — EYE SOL BSS 500ML

## (undated) DEVICE — LINEN TOWEL PACK X5 5464

## (undated) DEVICE — KIT ENDO FIRST STEP DISINFECTANT 200ML W/POUCH EP-4

## (undated) DEVICE — EYE PACK BVI READYPAK KIT #1

## (undated) DEVICE — EYE PACK CUSTOM ANTERIOR 30DEG TIP CENTURION PPK6682-04

## (undated) DEVICE — SOL WATER IRRIG 1000ML BOTTLE 07139-09

## (undated) DEVICE — PAD CHUX UNDERPAD 23X24" 7136

## (undated) DEVICE — PACK CATARACT CUSTOM SO DALE SEY32CTFCX

## (undated) RX ORDER — FENTANYL CITRATE 50 UG/ML
INJECTION, SOLUTION INTRAMUSCULAR; INTRAVENOUS
Status: DISPENSED
Start: 2024-09-24

## (undated) RX ORDER — FENTANYL CITRATE 50 UG/ML
INJECTION, SOLUTION INTRAMUSCULAR; INTRAVENOUS
Status: DISPENSED
Start: 2019-09-12

## (undated) RX ORDER — HYDROMORPHONE HYDROCHLORIDE 1 MG/ML
INJECTION, SOLUTION INTRAMUSCULAR; INTRAVENOUS; SUBCUTANEOUS
Status: DISPENSED
Start: 2017-06-06

## (undated) RX ORDER — SIMETHICONE 40MG/0.6ML
SUSPENSION, DROPS(FINAL DOSAGE FORM)(ML) ORAL
Status: DISPENSED
Start: 2024-09-24

## (undated) RX ORDER — ONDANSETRON 2 MG/ML
INJECTION INTRAMUSCULAR; INTRAVENOUS
Status: DISPENSED
Start: 2017-06-06